# Patient Record
Sex: FEMALE | Race: WHITE | Employment: OTHER | URBAN - METROPOLITAN AREA
[De-identification: names, ages, dates, MRNs, and addresses within clinical notes are randomized per-mention and may not be internally consistent; named-entity substitution may affect disease eponyms.]

---

## 2018-05-08 LAB
AVERAGE GLUCOSE: NORMAL
CHOLESTEROL, TOTAL: 226 MG/DL
CHOLESTEROL/HDL RATIO: ABNORMAL
HBA1C MFR BLD: 9.3 %
HDLC SERPL-MCNC: 58 MG/DL (ref 35–70)
LDL CHOLESTEROL CALCULATED: 164 MG/DL (ref 0–160)
TRIGL SERPL-MCNC: 305 MG/DL
VLDLC SERPL CALC-MCNC: ABNORMAL MG/DL

## 2018-09-25 ENCOUNTER — OFFICE VISIT (OUTPATIENT)
Dept: FAMILY MEDICINE CLINIC | Age: 78
End: 2018-09-25
Payer: MEDICARE

## 2018-09-25 VITALS
SYSTOLIC BLOOD PRESSURE: 132 MMHG | HEIGHT: 65 IN | DIASTOLIC BLOOD PRESSURE: 80 MMHG | HEART RATE: 67 BPM | RESPIRATION RATE: 20 BRPM | TEMPERATURE: 98.4 F | BODY MASS INDEX: 35.09 KG/M2 | WEIGHT: 210.6 LBS

## 2018-09-25 DIAGNOSIS — Z79.4 TYPE 2 DIABETES MELLITUS WITHOUT COMPLICATION, WITH LONG-TERM CURRENT USE OF INSULIN (HCC): Primary | ICD-10-CM

## 2018-09-25 DIAGNOSIS — F41.9 ANXIETY: ICD-10-CM

## 2018-09-25 DIAGNOSIS — Z23 NEEDS FLU SHOT: ICD-10-CM

## 2018-09-25 DIAGNOSIS — I10 ESSENTIAL HYPERTENSION: ICD-10-CM

## 2018-09-25 DIAGNOSIS — E78.5 HYPERLIPIDEMIA, UNSPECIFIED HYPERLIPIDEMIA TYPE: ICD-10-CM

## 2018-09-25 DIAGNOSIS — E11.9 TYPE 2 DIABETES MELLITUS WITHOUT COMPLICATION, WITH LONG-TERM CURRENT USE OF INSULIN (HCC): Primary | ICD-10-CM

## 2018-09-25 PROCEDURE — 90662 IIV NO PRSV INCREASED AG IM: CPT | Performed by: FAMILY MEDICINE

## 2018-09-25 PROCEDURE — G0008 ADMIN INFLUENZA VIRUS VAC: HCPCS | Performed by: FAMILY MEDICINE

## 2018-09-25 PROCEDURE — 90670 PCV13 VACCINE IM: CPT | Performed by: FAMILY MEDICINE

## 2018-09-25 PROCEDURE — 99203 OFFICE O/P NEW LOW 30 MIN: CPT | Performed by: FAMILY MEDICINE

## 2018-09-25 PROCEDURE — G0009 ADMIN PNEUMOCOCCAL VACCINE: HCPCS | Performed by: FAMILY MEDICINE

## 2018-09-25 RX ORDER — GLIMEPIRIDE 2 MG/1
1 TABLET ORAL 2 TIMES DAILY
COMMUNITY
Start: 2018-09-05 | End: 2018-12-11 | Stop reason: SDUPTHER

## 2018-09-25 RX ORDER — CHLORAL HYDRATE 500 MG
3000 CAPSULE ORAL 2 TIMES DAILY
COMMUNITY

## 2018-09-25 RX ORDER — CITALOPRAM 20 MG/1
1 TABLET ORAL DAILY
COMMUNITY
Start: 2018-07-09 | End: 2018-10-29 | Stop reason: SDUPTHER

## 2018-09-25 RX ORDER — INSULIN GLARGINE 100 [IU]/ML
18 INJECTION, SOLUTION SUBCUTANEOUS DAILY
COMMUNITY
Start: 2018-08-17 | End: 2019-03-27 | Stop reason: SDUPTHER

## 2018-09-25 RX ORDER — METOPROLOL TARTRATE 50 MG/1
1 TABLET, FILM COATED ORAL 2 TIMES DAILY
COMMUNITY
Start: 2018-09-05 | End: 2018-12-11 | Stop reason: SDUPTHER

## 2018-09-25 RX ORDER — AMLODIPINE BESYLATE 5 MG/1
1 TABLET ORAL DAILY
COMMUNITY
Start: 2018-08-23 | End: 2018-11-20 | Stop reason: SDUPTHER

## 2018-09-25 ASSESSMENT — PATIENT HEALTH QUESTIONNAIRE - PHQ9
2. FEELING DOWN, DEPRESSED OR HOPELESS: 0
1. LITTLE INTEREST OR PLEASURE IN DOING THINGS: 0
SUM OF ALL RESPONSES TO PHQ QUESTIONS 1-9: 0
SUM OF ALL RESPONSES TO PHQ QUESTIONS 1-9: 0
SUM OF ALL RESPONSES TO PHQ9 QUESTIONS 1 & 2: 0

## 2018-09-25 NOTE — PATIENT INSTRUCTIONS
cause a severe allergic reaction. Such reactions from a vaccine are very rare, estimated at about 1 in a million doses, and would happen within a few minutes to a few hours after the vaccination. As with any medicine, there is a very small chance of a vaccine causing a serious injury or death. The safety of vaccines is always being monitored. For more information, visit: www.cdc.gov/vaccinesafety. What if there is a serious reaction? What should I look for? · Look for anything that concerns you, such as signs of a severe allergic reaction, very high fever, or unusual behavior. Signs of a severe allergic reaction can include hives, swelling of the face and throat, difficulty breathing, a fast heartbeat, dizziness, and weakness, usually within a few minutes to a few hours after the vaccination. What should I do? · If you think it is a severe allergic reaction or other emergency that can't wait, call 911 or get the person to the nearest hospital. Otherwise, call your doctor. · Reactions should be reported to the Vaccine Adverse Event Reporting System (VAERS). Your doctor should file this report, or you can do it yourself through the VAERS website at www.vaers. [facility].gov, or by calling 7-474.530.5949. VAERS does not give medical advice. The National Vaccine Injury Compensation Program  The National Vaccine Injury Compensation Program (VICP) is a federal program that was created to compensate people who may have been injured by certain vaccines. Persons who believe they may have been injured by a vaccine can learn about the program and about filing a claim by calling 3-831.513.1613 or visiting the [employer] website at www.[facility].gov/vaccinecompensation. There is a time limit to file a claim for compensation. How can I learn more? · Ask your healthcare provider. He or she can give you the vaccine package insert or suggest other sources of information. · Call your local or state health department.   · Contact the Centers for Disease Control and Prevention (CDC):  ¨ Call 4-825.440.7582 (1-800-CDC-INFO) or  ¨ Visit CDC's website at www.cdc.gov/vaccines  Vaccine Information Statement  PCV13 Vaccine  11/5/2015  43 JESSICA Thomas [id number]  Department of Health and Human Services  Centers for Disease Control and Prevention  Many Vaccine Information Statements are available in Citizen of [place] and other languages. See www.immunize.org/vis. Muchas hojas de información sobre vacunas están disponibles en español y en otros idiomas. Visite www.immunize.org/vis. Care instructions adapted under license by [facility] ([facility]). If you have questions about a medical condition or this instruction, always ask your healthcare professional. [address] any warranty or liability for your use of this information.

## 2018-09-27 ASSESSMENT — ENCOUNTER SYMPTOMS
DIARRHEA: 0
SHORTNESS OF BREATH: 0
EYES NEGATIVE: 1
ABDOMINAL PAIN: 0
NAUSEA: 0
CHEST TIGHTNESS: 0
RHINORRHEA: 0
VOMITING: 0
COUGH: 0
BACK PAIN: 0
SORE THROAT: 0

## 2018-09-27 NOTE — PROGRESS NOTES
[address] [address]  Dept: [phone]  Dept Fax: [phone]  Loc: [phone]  PROGRESS NOTE      Visit Date: 9/25/2018    Jonathan Rabago is a 69 y.o. female who presents today for:  Chief Complaint   Patient presents with    New Patient     was in [facility] 7/8 for poss stroke but was ruled out. Says sx were from diabetes. BS this AM [id number] fasting. . Brother recommended  she see Dr. Cota So     discuss pneumonia vaccines. Subjective:  HPI  Patient comes in to establish. History of diabetes. Recent hospitalization for strokelike symptoms. To be due to elevated blood sugar. There is history of heart or hyperlipidemia hypertension obesity anxiety and hematuria. No current complaints today. Due for vaccines today to obtain old records    Review of Systems   Constitutional: Negative for activity change, appetite change, fatigue and fever. HENT: Negative for congestion, rhinorrhea and sore throat. Eyes: Negative. Respiratory: Negative for cough, chest tightness and shortness of breath. Cardiovascular: Negative for chest pain and palpitations. Gastrointestinal: Negative for abdominal pain, diarrhea, nausea and vomiting. Genitourinary: Negative for dysuria and urgency. Musculoskeletal: Negative for arthralgias and back pain. Neurological: Negative for dizziness and headaches. Psychiatric/Behavioral: Negative for dysphoric mood. The patient is not nervous/anxious. Past Medical History:   Diagnosis Date    Anxiety     Cardiac murmur     Hypercholesterolemia     no medication recommended - just dietary recommendations    Hypertension     130's/80-90's at home, white coat hypertension, stress test 2007 prior to TKA at [facility] 112 Microscopic hematuria     Obesity (BMI 30-39. 9)     Snoring     no apnea, no waking coughing or choking, no am headache, BMI Standing Expiration Date:   9/25/2019       Patient given educational materials - see patient instructions. Discussed use, benefit, and side effects of prescribed medications. All patient questions answered. Pt voiced understanding. Reviewed health maintenance. Patient agreed with treatment plan. Follow up as directed. **This report has been created using voice recognition software. It may contain minor errors which are inherent in voice recognition technology. **       Electronically signed by Dolly Osorio MD on 9/27/2018 at 8:45 AM

## 2018-10-11 LAB
A/G RATIO: 1.5 (ref 1.5–2.5)
ALBUMIN SERPL-MCNC: 4.4 GM/DL (ref 3.5–5)
ALP BLD-CCNC: 59 IU/L (ref 39–118)
ALT SERPL-CCNC: 14 IU/L (ref 10–40)
ANION GAP SERPL CALCULATED.3IONS-SCNC: 9 MMOL/L (ref 4–12)
AST SERPL-CCNC: 14 IU/L (ref 15–41)
BILIRUB SERPL-MCNC: 0.2 MG/DL (ref 0.2–1)
BUN BLDV-MCNC: 14 MG/DL (ref 7–20)
CALCIUM SERPL-MCNC: 9 MG/DL (ref 8.8–10.5)
CHLORIDE BLD-SCNC: 101 MEQ/L (ref 101–111)
CHOLESTEROL/HDL RELATIVE RISK: 4.5 (ref 4–4.4)
CHOLESTEROL: 222 MG/DL
CO2: 31 MEQ/L (ref 21–32)
CREAT SERPL-MCNC: 0.55 MG/DL (ref 0.6–1.3)
CREATININE CLEARANCE: >60
DIRECT-LDL / HDL RISK: 3.4
ESTIMATED AVERAGE GLUCOSE: 169 MG/DL
GLUCOSE: 122 MG/DL (ref 70–110)
HBA1C MFR BLD: 7.5 % (ref 4.4–6.4)
HDLC SERPL-MCNC: 49 MG/DL
LDL CHOLESTEROL DIRECT: 171 MG/DL
POTASSIUM SERPL-SCNC: 3.6 MEQ/L (ref 3.6–5)
SODIUM BLD-SCNC: 141 MEQ/L (ref 135–145)
TOTAL PROTEIN: 7.3 G/DL (ref 6.2–8)
TRIGL SERPL-MCNC: 104 MG/DL
VLDLC SERPL CALC-MCNC: 20 MG/DL

## 2018-10-29 ENCOUNTER — OFFICE VISIT (OUTPATIENT)
Dept: FAMILY MEDICINE CLINIC | Age: 78
End: 2018-10-29
Payer: MEDICARE

## 2018-10-29 VITALS
TEMPERATURE: 98.3 F | HEART RATE: 76 BPM | WEIGHT: 210 LBS | DIASTOLIC BLOOD PRESSURE: 82 MMHG | SYSTOLIC BLOOD PRESSURE: 136 MMHG | BODY MASS INDEX: 34.95 KG/M2

## 2018-10-29 DIAGNOSIS — E11.9 TYPE 2 DIABETES MELLITUS WITHOUT COMPLICATION, WITH LONG-TERM CURRENT USE OF INSULIN (HCC): Primary | ICD-10-CM

## 2018-10-29 DIAGNOSIS — I10 ESSENTIAL HYPERTENSION: ICD-10-CM

## 2018-10-29 DIAGNOSIS — Z79.4 TYPE 2 DIABETES MELLITUS WITHOUT COMPLICATION, WITH LONG-TERM CURRENT USE OF INSULIN (HCC): Primary | ICD-10-CM

## 2018-10-29 DIAGNOSIS — E78.5 HYPERLIPIDEMIA, UNSPECIFIED HYPERLIPIDEMIA TYPE: ICD-10-CM

## 2018-10-29 PROCEDURE — 99213 OFFICE O/P EST LOW 20 MIN: CPT | Performed by: FAMILY MEDICINE

## 2018-10-29 RX ORDER — ROSUVASTATIN CALCIUM 10 MG/1
10 TABLET, COATED ORAL NIGHTLY
Qty: 90 TABLET | Refills: 3 | Status: ON HOLD | OUTPATIENT
Start: 2018-10-29 | End: 2019-07-30 | Stop reason: ALTCHOICE

## 2018-10-29 RX ORDER — CITALOPRAM 20 MG/1
20 TABLET ORAL DAILY
Qty: 90 TABLET | Refills: 3 | Status: SHIPPED | OUTPATIENT
Start: 2018-10-29 | End: 2020-01-07 | Stop reason: ALTCHOICE

## 2018-10-30 ASSESSMENT — ENCOUNTER SYMPTOMS
RHINORRHEA: 0
DIARRHEA: 0
SORE THROAT: 0
NAUSEA: 0
CHEST TIGHTNESS: 0
EYES NEGATIVE: 1
BACK PAIN: 0
VOMITING: 0
COUGH: 0
SHORTNESS OF BREATH: 0
ABDOMINAL PAIN: 0

## 2018-10-30 NOTE — PROGRESS NOTES
10/11/2019    Flu vaccine  Completed         Objective:     Physical Exam   Constitutional: She is oriented to person, place, and time. She appears well-developed and well-nourished. No distress. HENT:   Head: Normocephalic and atraumatic. Eyes: Conjunctivae are normal. No scleral icterus. Neck: No JVD present. No thyromegaly present. No bruits   Cardiovascular: Normal rate, regular rhythm and normal heart sounds. Pulmonary/Chest: Effort normal and breath sounds normal. No respiratory distress. She has no wheezes. She has no rales. Abdominal: Soft. She exhibits no mass. There is no tenderness. There is no guarding. Musculoskeletal: She exhibits no edema or tenderness. Neurological: She is alert and oriented to person, place, and time. No cranial nerve deficit. Skin: Skin is warm and dry. No rash noted. She is not diaphoretic. /82   Pulse 76   Temp 98.3 °F (36.8 °C) (Oral)   Wt 210 lb (95.3 kg)   BMI 34.95 kg/m²       Impression/Plan:  1. Type 2 diabetes mellitus without complication, with long-term current use of insulin ([facility] [address]    2. Essential hypertension    3. Hyperlipidemia, unspecified hyperlipidemia type      Requested Prescriptions     Signed Prescriptions Disp Refills    citalopram (CELEXA) 20 MG tablet 90 tablet 3     Sig: Take 1 tablet by mouth daily    rosuvastatin (CRESTOR) 10 MG tablet 90 tablet 3     Sig: Take 1 tablet by mouth nightly     Orders Placed This Encounter   Procedures    Hemoglobin A1C     Standing Status:   Future     Standing Expiration Date:   10/29/2019    Lipid Panel     Standing Status:   Future     Standing Expiration Date:   10/29/2019     Order Specific Question:   Is Patient Fasting?/# of Hours     Answer:   yes/12    Comprehensive Metabolic Panel     Standing Status:   Future     Standing Expiration Date:   10/29/2019       Patient giveneducational materials - see patient instructions.   Discussed use, benefit, and side effects of prescribed

## 2018-11-20 RX ORDER — AMLODIPINE BESYLATE 5 MG/1
5 TABLET ORAL DAILY
Qty: 30 TABLET | Refills: 5 | Status: SHIPPED | OUTPATIENT
Start: 2018-11-20 | End: 2019-03-27 | Stop reason: SDUPTHER

## 2018-11-30 ENCOUNTER — OFFICE VISIT (OUTPATIENT)
Dept: FAMILY MEDICINE CLINIC | Age: 78
End: 2018-11-30
Payer: MEDICARE

## 2018-11-30 VITALS
DIASTOLIC BLOOD PRESSURE: 62 MMHG | BODY MASS INDEX: 35.18 KG/M2 | HEART RATE: 60 BPM | RESPIRATION RATE: 16 BRPM | TEMPERATURE: 98.2 F | WEIGHT: 211.4 LBS | SYSTOLIC BLOOD PRESSURE: 139 MMHG

## 2018-11-30 DIAGNOSIS — R06.02 SHORTNESS OF BREATH: Primary | ICD-10-CM

## 2018-11-30 DIAGNOSIS — R42 DIZZINESS: ICD-10-CM

## 2018-11-30 PROCEDURE — 99214 OFFICE O/P EST MOD 30 MIN: CPT | Performed by: NURSE PRACTITIONER

## 2018-11-30 RX ORDER — ASPIRIN 325 MG
325 TABLET ORAL DAILY
COMMUNITY
End: 2020-02-25 | Stop reason: HOSPADM

## 2018-11-30 ASSESSMENT — ENCOUNTER SYMPTOMS
WHEEZING: 0
SHORTNESS OF BREATH: 1
DIARRHEA: 0
NAUSEA: 0
COUGH: 0
CONSTIPATION: 0
ABDOMINAL PAIN: 0
SORE THROAT: 0

## 2018-12-03 ENCOUNTER — TELEPHONE (OUTPATIENT)
Dept: FAMILY MEDICINE CLINIC | Age: 78
End: 2018-12-03

## 2018-12-04 ENCOUNTER — OFFICE VISIT (OUTPATIENT)
Dept: FAMILY MEDICINE CLINIC | Age: 78
End: 2018-12-04
Payer: MEDICARE

## 2018-12-04 VITALS
BODY MASS INDEX: 34.61 KG/M2 | HEART RATE: 65 BPM | RESPIRATION RATE: 16 BRPM | SYSTOLIC BLOOD PRESSURE: 138 MMHG | TEMPERATURE: 97.6 F | DIASTOLIC BLOOD PRESSURE: 84 MMHG | OXYGEN SATURATION: 99 % | WEIGHT: 208 LBS

## 2018-12-04 DIAGNOSIS — R42 VERTIGO: Primary | ICD-10-CM

## 2018-12-04 PROCEDURE — 99213 OFFICE O/P EST LOW 20 MIN: CPT | Performed by: FAMILY MEDICINE

## 2018-12-04 RX ORDER — MECLIZINE HYDROCHLORIDE 25 MG/1
25 TABLET ORAL 3 TIMES DAILY PRN
Qty: 30 TABLET | Refills: 0 | Status: SHIPPED | OUTPATIENT
Start: 2018-12-04 | End: 2018-12-14

## 2018-12-09 ASSESSMENT — ENCOUNTER SYMPTOMS
DIARRHEA: 0
CHEST TIGHTNESS: 0
NAUSEA: 0
COUGH: 0
VOMITING: 0
BACK PAIN: 0
EYES NEGATIVE: 1
SORE THROAT: 0
SHORTNESS OF BREATH: 0
RHINORRHEA: 0
ABDOMINAL PAIN: 0

## 2018-12-09 NOTE — PROGRESS NOTES
04/22/2006    Pneumococcal low/med risk (2 of 2 - PPSV23) 09/25/2019    Potassium monitoring  10/11/2019    Creatinine monitoring  10/11/2019    Flu vaccine  Completed         Objective:     Physical Exam   Constitutional: She is oriented to person, place, and time. She appears well-developed and well-nourished. No distress. HENT:   Head: Normocephalic and atraumatic. Eyes: Conjunctivae are normal. No scleral icterus. Neck: No JVD present. No thyromegaly present. No bruits   Cardiovascular: Normal rate, regular rhythm and normal heart sounds. Pulmonary/Chest: Effort normal and breath sounds normal. No respiratory distress. She has no wheezes. She has no rales. Abdominal: Soft. She exhibits no mass. There is no tenderness. There is no guarding. Musculoskeletal: She exhibits no edema or tenderness. Neurological: She is alert and oriented to person, place, and time. No cranial nerve deficit. Has nystagmus has   Skin: Skin is warm and dry. No rash noted. She is not diaphoretic. /84   Pulse 65   Temp 97.6 °F (36.4 °C) (Oral)   Resp 16   Wt 208 lb (94.3 kg)   SpO2 99%   BMI 34.61 kg/m²       Impression/Plan:  1. Vertigo      Requested Prescriptions     Signed Prescriptions Disp Refills    meclizine (ANTIVERT) 25 MG tablet 30 tablet 0     Sig: Take 1 tablet by mouth 3 times daily as needed for Dizziness     No orders of the defined types were placed in this encounter. Patient giveneducational materials - see patient instructions. Discussed use, benefit, and side effects of prescribed medications. All patient questions answered. Pt voiced understanding. Reviewed health maintenance. Patient agreedwith treatment plan. Follow up as directed. **This report has been created using voice recognition software. It may contain minor errorswhich are inherent in voice recognition technology. **       Electronically signed by Harris Rodriguez MD on 12/9/2018 at 9:27 AM

## 2018-12-11 RX ORDER — GLIMEPIRIDE 2 MG/1
2 TABLET ORAL 2 TIMES DAILY
Qty: 180 TABLET | Refills: 2 | Status: SHIPPED | OUTPATIENT
Start: 2018-12-11 | End: 2019-09-10 | Stop reason: SDUPTHER

## 2018-12-11 RX ORDER — METOPROLOL TARTRATE 50 MG/1
50 TABLET, FILM COATED ORAL 2 TIMES DAILY
Qty: 180 TABLET | Refills: 2 | Status: SHIPPED | OUTPATIENT
Start: 2018-12-11 | End: 2019-09-10 | Stop reason: SDUPTHER

## 2018-12-11 NOTE — TELEPHONE ENCOUNTER
12/11/2018   Tammie Cotton called requesting a refill on the following medications:  Requested Prescriptions     Pending Prescriptions Disp Refills    metoprolol tartrate (LOPRESSOR) 50 MG tablet 60 tablet      Sig: Take 1 tablet by mouth 2 times daily    glimepiride (AMARYL) 2 MG tablet 30 tablet      Sig: Take 1 tablet by mouth 2 times daily     Pharmacy verified:  .pv    PATIENT REQUESTING 180 QUANTITY FOR BOTH MEDICATIONS.     Date of last visit: 12/04/2018  Date of next visit (if applicable): Visit date not found

## 2019-01-11 RX ORDER — RAMIPRIL 10 MG/1
10 CAPSULE ORAL DAILY
Qty: 30 CAPSULE | Refills: 3 | Status: SHIPPED | OUTPATIENT
Start: 2019-01-11 | End: 2019-05-13 | Stop reason: SDUPTHER

## 2019-03-27 ENCOUNTER — TELEPHONE (OUTPATIENT)
Dept: FAMILY MEDICINE CLINIC | Age: 79
End: 2019-03-27

## 2019-03-27 DIAGNOSIS — Z79.4 TYPE 2 DIABETES MELLITUS WITHOUT COMPLICATION, WITH LONG-TERM CURRENT USE OF INSULIN (HCC): Primary | ICD-10-CM

## 2019-03-27 DIAGNOSIS — E11.9 TYPE 2 DIABETES MELLITUS WITHOUT COMPLICATION, WITH LONG-TERM CURRENT USE OF INSULIN (HCC): Primary | ICD-10-CM

## 2019-03-27 RX ORDER — INSULIN GLARGINE 100 [IU]/ML
18 INJECTION, SOLUTION SUBCUTANEOUS DAILY
Qty: 5 PEN | Refills: 5 | Status: SHIPPED | OUTPATIENT
Start: 2019-03-27 | End: 2020-06-17

## 2019-03-27 RX ORDER — AMLODIPINE BESYLATE 5 MG/1
5 TABLET ORAL DAILY
Qty: 30 TABLET | Refills: 5 | Status: SHIPPED | OUTPATIENT
Start: 2019-03-27 | End: 2019-10-07 | Stop reason: SDUPTHER

## 2019-04-08 NOTE — TELEPHONE ENCOUNTER
Popeye Tian called requesting a refill on the following medications:  Requested Prescriptions     Pending Prescriptions Disp Refills    metFORMIN (GLUCOPHAGE) 1000 MG tablet       Sig: Take 1 tablet by mouth 2 times daily     Pharmacy verified: [facility]       Date of last visit: 12/4/18  Date of next visit (if applicable): Visit date not found

## 2019-05-14 RX ORDER — RAMIPRIL 10 MG/1
CAPSULE ORAL
Qty: 90 CAPSULE | Refills: 1 | Status: SHIPPED | OUTPATIENT
Start: 2019-05-14 | End: 2019-11-12 | Stop reason: SDUPTHER

## 2019-07-30 ENCOUNTER — APPOINTMENT (OUTPATIENT)
Dept: GENERAL RADIOLOGY | Age: 79
End: 2019-07-30
Attending: ORTHOPAEDIC SURGERY
Payer: MEDICARE

## 2019-07-30 ENCOUNTER — ANESTHESIA (OUTPATIENT)
Dept: OPERATING ROOM | Age: 79
End: 2019-07-30
Payer: MEDICARE

## 2019-07-30 ENCOUNTER — HOSPITAL ENCOUNTER (OUTPATIENT)
Age: 79
Setting detail: OUTPATIENT SURGERY
Discharge: HOME | End: 2019-07-30
Attending: ORTHOPAEDIC SURGERY | Admitting: ORTHOPAEDIC SURGERY
Payer: MEDICARE

## 2019-07-30 ENCOUNTER — ANESTHESIA EVENT (OUTPATIENT)
Dept: OPERATING ROOM | Age: 79
End: 2019-07-30
Payer: MEDICARE

## 2019-07-30 VITALS
SYSTOLIC BLOOD PRESSURE: 149 MMHG | DIASTOLIC BLOOD PRESSURE: 68 MMHG | HEIGHT: 65 IN | BODY MASS INDEX: 35.29 KG/M2 | OXYGEN SATURATION: 92 % | HEART RATE: 76 BPM | RESPIRATION RATE: 16 BRPM | TEMPERATURE: 98.2 F | WEIGHT: 211.8 LBS

## 2019-07-30 VITALS
SYSTOLIC BLOOD PRESSURE: 139 MMHG | DIASTOLIC BLOOD PRESSURE: 68 MMHG | RESPIRATION RATE: 2 BRPM | OXYGEN SATURATION: 98 %

## 2019-07-30 DIAGNOSIS — S52.502A CLOSED FRACTURE OF LEFT DISTAL RADIUS AND ULNA, INITIAL ENCOUNTER: Primary | ICD-10-CM

## 2019-07-30 DIAGNOSIS — S52.602A CLOSED FRACTURE OF LEFT DISTAL RADIUS AND ULNA, INITIAL ENCOUNTER: Primary | ICD-10-CM

## 2019-07-30 LAB
GLUCOSE BLD-MCNC: 175 MG/DL (ref 70–108)
GLUCOSE BLD-MCNC: 176 MG/DL (ref 70–108)

## 2019-07-30 PROCEDURE — 3700000000 HC ANESTHESIA ATTENDED CARE: Performed by: ORTHOPAEDIC SURGERY

## 2019-07-30 PROCEDURE — 6360000002 HC RX W HCPCS: Performed by: NURSE ANESTHETIST, CERTIFIED REGISTERED

## 2019-07-30 PROCEDURE — 6360000002 HC RX W HCPCS: Performed by: PHYSICIAN ASSISTANT

## 2019-07-30 PROCEDURE — 82948 REAGENT STRIP/BLOOD GLUCOSE: CPT

## 2019-07-30 PROCEDURE — 3700000001 HC ADD 15 MINUTES (ANESTHESIA): Performed by: ORTHOPAEDIC SURGERY

## 2019-07-30 PROCEDURE — 7100000001 HC PACU RECOVERY - ADDTL 15 MIN: Performed by: ORTHOPAEDIC SURGERY

## 2019-07-30 PROCEDURE — 3209999900 FLUORO FOR SURGICAL PROCEDURES

## 2019-07-30 PROCEDURE — 2580000003 HC RX 258: Performed by: NURSE ANESTHETIST, CERTIFIED REGISTERED

## 2019-07-30 PROCEDURE — 6360000002 HC RX W HCPCS: Performed by: ANESTHESIOLOGY

## 2019-07-30 PROCEDURE — 7100000000 HC PACU RECOVERY - FIRST 15 MIN: Performed by: ORTHOPAEDIC SURGERY

## 2019-07-30 PROCEDURE — 2709999900 HC NON-CHARGEABLE SUPPLY: Performed by: ORTHOPAEDIC SURGERY

## 2019-07-30 PROCEDURE — 7100000011 HC PHASE II RECOVERY - ADDTL 15 MIN: Performed by: ORTHOPAEDIC SURGERY

## 2019-07-30 PROCEDURE — 2500000003 HC RX 250 WO HCPCS: Performed by: NURSE ANESTHETIST, CERTIFIED REGISTERED

## 2019-07-30 PROCEDURE — 2500000003 HC RX 250 WO HCPCS: Performed by: ANESTHESIOLOGY

## 2019-07-30 PROCEDURE — 7100000010 HC PHASE II RECOVERY - FIRST 15 MIN: Performed by: ORTHOPAEDIC SURGERY

## 2019-07-30 PROCEDURE — 6370000000 HC RX 637 (ALT 250 FOR IP): Performed by: ORTHOPAEDIC SURGERY

## 2019-07-30 PROCEDURE — C1713 ANCHOR/SCREW BN/BN,TIS/BN: HCPCS | Performed by: ORTHOPAEDIC SURGERY

## 2019-07-30 PROCEDURE — 73100 X-RAY EXAM OF WRIST: CPT

## 2019-07-30 PROCEDURE — 3600000014 HC SURGERY LEVEL 4 ADDTL 15MIN: Performed by: ORTHOPAEDIC SURGERY

## 2019-07-30 PROCEDURE — 6360000002 HC RX W HCPCS

## 2019-07-30 PROCEDURE — 2580000003 HC RX 258: Performed by: PHYSICIAN ASSISTANT

## 2019-07-30 PROCEDURE — 3600000004 HC SURGERY LEVEL 4 BASE: Performed by: ORTHOPAEDIC SURGERY

## 2019-07-30 DEVICE — PERI-LOC 2.5MM T7 LOCKING SCREW                                    20MM SELF-TAPPING
Type: IMPLANTABLE DEVICE | Site: WRIST | Status: FUNCTIONAL
Brand: PERI-LOC

## 2019-07-30 DEVICE — PERI-LOC K-WIRE 1.6MM X 150MM                                    LENGTH TROCAR POINT
Type: IMPLANTABLE DEVICE | Site: WRIST | Status: FUNCTIONAL
Brand: PERI-LOC

## 2019-07-30 DEVICE — PERI-LOC 2.5MM T7 LOCKING SCREW                                    18MM SELF-TAPPING
Type: IMPLANTABLE DEVICE | Site: WRIST | Status: FUNCTIONAL
Brand: PERI-LOC

## 2019-07-30 DEVICE — PERI-LOC 3.5MM T20 CORTEX SCREW                                    12MM SELF-TAPPING
Type: IMPLANTABLE DEVICE | Site: WRIST | Status: FUNCTIONAL
Brand: PERI-LOC

## 2019-07-30 DEVICE — PERI-LOC 2.5MM T7 LOCKING SCREW                                    16MM SELF-TAPPING
Type: IMPLANTABLE DEVICE | Site: WRIST | Status: FUNCTIONAL
Brand: PERI-LOC

## 2019-07-30 DEVICE — PERI-LOC VOLAR DISTAL RADIUS                                    LOCKING PLATE STANDARD HEAD 3 HOLE                                    SHAFT RIGHT 62MM
Type: IMPLANTABLE DEVICE | Site: WRIST | Status: FUNCTIONAL
Brand: PERI-LOC

## 2019-07-30 DEVICE — PERI-LOC 3.5MM T20 LOCKING SCREW                                    14MM SELF-TAPPING
Type: IMPLANTABLE DEVICE | Site: WRIST | Status: FUNCTIONAL
Brand: PERI-LOC

## 2019-07-30 RX ORDER — FENTANYL CITRATE 50 UG/ML
25 INJECTION, SOLUTION INTRAMUSCULAR; INTRAVENOUS EVERY 5 MIN PRN
Status: DISCONTINUED | OUTPATIENT
Start: 2019-07-30 | End: 2019-07-30 | Stop reason: HOSPADM

## 2019-07-30 RX ORDER — LABETALOL 20 MG/4 ML (5 MG/ML) INTRAVENOUS SYRINGE
10 ONCE
Status: COMPLETED | OUTPATIENT
Start: 2019-07-30 | End: 2019-07-30

## 2019-07-30 RX ORDER — MORPHINE SULFATE 2 MG/ML
2 INJECTION, SOLUTION INTRAMUSCULAR; INTRAVENOUS
Status: DISCONTINUED | OUTPATIENT
Start: 2019-07-30 | End: 2019-07-30 | Stop reason: HOSPADM

## 2019-07-30 RX ORDER — SODIUM CHLORIDE 0.9 % (FLUSH) 0.9 %
10 SYRINGE (ML) INJECTION EVERY 12 HOURS SCHEDULED
Status: DISCONTINUED | OUTPATIENT
Start: 2019-07-30 | End: 2019-07-30 | Stop reason: HOSPADM

## 2019-07-30 RX ORDER — METOCLOPRAMIDE HYDROCHLORIDE 5 MG/ML
10 INJECTION INTRAMUSCULAR; INTRAVENOUS
Status: DISCONTINUED | OUTPATIENT
Start: 2019-07-30 | End: 2019-07-30 | Stop reason: HOSPADM

## 2019-07-30 RX ORDER — DOCUSATE SODIUM 100 MG/1
100 CAPSULE, LIQUID FILLED ORAL 2 TIMES DAILY
Status: DISCONTINUED | OUTPATIENT
Start: 2019-07-30 | End: 2019-07-30 | Stop reason: HOSPADM

## 2019-07-30 RX ORDER — SODIUM CHLORIDE, SODIUM LACTATE, POTASSIUM CHLORIDE, CALCIUM CHLORIDE 600; 310; 30; 20 MG/100ML; MG/100ML; MG/100ML; MG/100ML
INJECTION, SOLUTION INTRAVENOUS CONTINUOUS PRN
Status: DISCONTINUED | OUTPATIENT
Start: 2019-07-30 | End: 2019-07-30 | Stop reason: SDUPTHER

## 2019-07-30 RX ORDER — ONDANSETRON 2 MG/ML
INJECTION INTRAMUSCULAR; INTRAVENOUS PRN
Status: DISCONTINUED | OUTPATIENT
Start: 2019-07-30 | End: 2019-07-30 | Stop reason: SDUPTHER

## 2019-07-30 RX ORDER — FENTANYL CITRATE 50 UG/ML
50 INJECTION, SOLUTION INTRAMUSCULAR; INTRAVENOUS EVERY 5 MIN PRN
Status: DISCONTINUED | OUTPATIENT
Start: 2019-07-30 | End: 2019-07-30 | Stop reason: HOSPADM

## 2019-07-30 RX ORDER — PROMETHAZINE HYDROCHLORIDE 25 MG/ML
12.5 INJECTION, SOLUTION INTRAMUSCULAR; INTRAVENOUS
Status: DISCONTINUED | OUTPATIENT
Start: 2019-07-30 | End: 2019-07-30 | Stop reason: HOSPADM

## 2019-07-30 RX ORDER — LABETALOL 20 MG/4 ML (5 MG/ML) INTRAVENOUS SYRINGE
PRN
Status: DISCONTINUED | OUTPATIENT
Start: 2019-07-30 | End: 2019-07-30 | Stop reason: SDUPTHER

## 2019-07-30 RX ORDER — SODIUM CHLORIDE 0.9 % (FLUSH) 0.9 %
10 SYRINGE (ML) INJECTION PRN
Status: DISCONTINUED | OUTPATIENT
Start: 2019-07-30 | End: 2019-07-30 | Stop reason: HOSPADM

## 2019-07-30 RX ORDER — HYDROCODONE BITARTRATE AND ACETAMINOPHEN 5; 325 MG/1; MG/1
1 TABLET ORAL EVERY 4 HOURS PRN
Qty: 42 TABLET | Refills: 0 | Status: SHIPPED | OUTPATIENT
Start: 2019-07-30 | End: 2019-08-06

## 2019-07-30 RX ORDER — FENTANYL CITRATE 50 UG/ML
INJECTION, SOLUTION INTRAMUSCULAR; INTRAVENOUS
Status: COMPLETED
Start: 2019-07-30 | End: 2019-07-30

## 2019-07-30 RX ORDER — HYDRALAZINE HYDROCHLORIDE 20 MG/ML
INJECTION INTRAMUSCULAR; INTRAVENOUS
Status: COMPLETED
Start: 2019-07-30 | End: 2019-07-30

## 2019-07-30 RX ORDER — SODIUM CHLORIDE 9 MG/ML
INJECTION, SOLUTION INTRAVENOUS CONTINUOUS
Status: DISCONTINUED | OUTPATIENT
Start: 2019-07-30 | End: 2019-07-30 | Stop reason: HOSPADM

## 2019-07-30 RX ORDER — TRAMADOL HYDROCHLORIDE 50 MG/1
50 TABLET ORAL EVERY 6 HOURS PRN
Status: DISCONTINUED | OUTPATIENT
Start: 2019-07-30 | End: 2019-07-30 | Stop reason: HOSPADM

## 2019-07-30 RX ORDER — FENTANYL CITRATE 50 UG/ML
INJECTION, SOLUTION INTRAMUSCULAR; INTRAVENOUS PRN
Status: DISCONTINUED | OUTPATIENT
Start: 2019-07-30 | End: 2019-07-30 | Stop reason: SDUPTHER

## 2019-07-30 RX ORDER — LIDOCAINE HYDROCHLORIDE 10 MG/ML
INJECTION, SOLUTION INFILTRATION; PERINEURAL PRN
Status: DISCONTINUED | OUTPATIENT
Start: 2019-07-30 | End: 2019-07-30 | Stop reason: SDUPTHER

## 2019-07-30 RX ORDER — LABETALOL 20 MG/4 ML (5 MG/ML) INTRAVENOUS SYRINGE
5 EVERY 10 MIN PRN
Status: DISCONTINUED | OUTPATIENT
Start: 2019-07-30 | End: 2019-07-30 | Stop reason: HOSPADM

## 2019-07-30 RX ORDER — DIPHENHYDRAMINE HYDROCHLORIDE 50 MG/ML
12.5 INJECTION INTRAMUSCULAR; INTRAVENOUS
Status: DISCONTINUED | OUTPATIENT
Start: 2019-07-30 | End: 2019-07-30 | Stop reason: HOSPADM

## 2019-07-30 RX ORDER — MEPERIDINE HYDROCHLORIDE 25 MG/ML
12.5 INJECTION INTRAMUSCULAR; INTRAVENOUS; SUBCUTANEOUS EVERY 5 MIN PRN
Status: DISCONTINUED | OUTPATIENT
Start: 2019-07-30 | End: 2019-07-30 | Stop reason: HOSPADM

## 2019-07-30 RX ORDER — PROPOFOL 10 MG/ML
INJECTION, EMULSION INTRAVENOUS PRN
Status: DISCONTINUED | OUTPATIENT
Start: 2019-07-30 | End: 2019-07-30 | Stop reason: SDUPTHER

## 2019-07-30 RX ORDER — ONDANSETRON 2 MG/ML
4 INJECTION INTRAMUSCULAR; INTRAVENOUS EVERY 6 HOURS PRN
Status: DISCONTINUED | OUTPATIENT
Start: 2019-07-30 | End: 2019-07-30 | Stop reason: HOSPADM

## 2019-07-30 RX ORDER — HYDRALAZINE HYDROCHLORIDE 20 MG/ML
10 INJECTION INTRAMUSCULAR; INTRAVENOUS EVERY 10 MIN PRN
Status: DISCONTINUED | OUTPATIENT
Start: 2019-07-30 | End: 2019-07-30 | Stop reason: HOSPADM

## 2019-07-30 RX ORDER — TRAMADOL HYDROCHLORIDE 50 MG/1
TABLET ORAL
Status: DISCONTINUED
Start: 2019-07-30 | End: 2019-07-30 | Stop reason: HOSPADM

## 2019-07-30 RX ORDER — DEXAMETHASONE SODIUM PHOSPHATE 4 MG/ML
INJECTION, SOLUTION INTRA-ARTICULAR; INTRALESIONAL; INTRAMUSCULAR; INTRAVENOUS; SOFT TISSUE PRN
Status: DISCONTINUED | OUTPATIENT
Start: 2019-07-30 | End: 2019-07-30 | Stop reason: SDUPTHER

## 2019-07-30 RX ADMIN — LABETALOL 20 MG/4 ML (5 MG/ML) INTRAVENOUS SYRINGE 10 MG: at 16:10

## 2019-07-30 RX ADMIN — HYDRALAZINE HYDROCHLORIDE 10 MG: 20 INJECTION INTRAMUSCULAR; INTRAVENOUS at 16:24

## 2019-07-30 RX ADMIN — SODIUM CHLORIDE, POTASSIUM CHLORIDE, SODIUM LACTATE AND CALCIUM CHLORIDE: 600; 310; 30; 20 INJECTION, SOLUTION INTRAVENOUS at 15:15

## 2019-07-30 RX ADMIN — FENTANYL CITRATE 50 MCG: 50 INJECTION INTRAMUSCULAR; INTRAVENOUS at 14:33

## 2019-07-30 RX ADMIN — FENTANYL CITRATE 50 MCG: 50 INJECTION, SOLUTION INTRAMUSCULAR; INTRAVENOUS at 16:00

## 2019-07-30 RX ADMIN — FENTANYL CITRATE 50 MCG: 50 INJECTION, SOLUTION INTRAMUSCULAR; INTRAVENOUS at 15:51

## 2019-07-30 RX ADMIN — LIDOCAINE HYDROCHLORIDE 50 MG: 10 INJECTION, SOLUTION INFILTRATION; PERINEURAL at 14:33

## 2019-07-30 RX ADMIN — DEXAMETHASONE SODIUM PHOSPHATE 8 MG: 4 INJECTION, SOLUTION INTRAMUSCULAR; INTRAVENOUS at 14:40

## 2019-07-30 RX ADMIN — SODIUM CHLORIDE: 9 INJECTION, SOLUTION INTRAVENOUS at 11:48

## 2019-07-30 RX ADMIN — HYDROMORPHONE HYDROCHLORIDE 0.5 MG: 1 INJECTION, SOLUTION INTRAMUSCULAR; INTRAVENOUS; SUBCUTANEOUS at 16:30

## 2019-07-30 RX ADMIN — LABETALOL 20 MG/4 ML (5 MG/ML) INTRAVENOUS SYRINGE 5 MG: at 15:45

## 2019-07-30 RX ADMIN — LABETALOL 20 MG/4 ML (5 MG/ML) INTRAVENOUS SYRINGE 5 MG: at 15:10

## 2019-07-30 RX ADMIN — LABETALOL 20 MG/4 ML (5 MG/ML) INTRAVENOUS SYRINGE 7.5 MG: at 14:56

## 2019-07-30 RX ADMIN — LABETALOL 20 MG/4 ML (5 MG/ML) INTRAVENOUS SYRINGE 10 MG: at 15:37

## 2019-07-30 RX ADMIN — FENTANYL CITRATE 50 MCG: 50 INJECTION INTRAMUSCULAR; INTRAVENOUS at 15:10

## 2019-07-30 RX ADMIN — PROPOFOL 150 MG: 10 INJECTION, EMULSION INTRAVENOUS at 14:33

## 2019-07-30 RX ADMIN — ONDANSETRON HYDROCHLORIDE 4 MG: 4 INJECTION, SOLUTION INTRAMUSCULAR; INTRAVENOUS at 15:15

## 2019-07-30 RX ADMIN — LABETALOL 20 MG/4 ML (5 MG/ML) INTRAVENOUS SYRINGE 7.5 MG: at 15:26

## 2019-07-30 RX ADMIN — FENTANYL CITRATE 50 MCG: 50 INJECTION INTRAMUSCULAR; INTRAVENOUS at 14:51

## 2019-07-30 RX ADMIN — HYDROMORPHONE HYDROCHLORIDE 0.5 MG: 1 INJECTION, SOLUTION INTRAMUSCULAR; INTRAVENOUS; SUBCUTANEOUS at 16:38

## 2019-07-30 RX ADMIN — TRAMADOL HYDROCHLORIDE 50 MG: 50 TABLET, FILM COATED ORAL at 17:41

## 2019-07-30 RX ADMIN — Medication 2 G: at 14:38

## 2019-07-30 RX ADMIN — FENTANYL CITRATE 50 MCG: 50 INJECTION INTRAMUSCULAR; INTRAVENOUS at 14:48

## 2019-07-30 SDOH — HEALTH STABILITY: MENTAL HEALTH: HOW OFTEN DO YOU HAVE A DRINK CONTAINING ALCOHOL?: NEVER

## 2019-07-30 ASSESSMENT — PULMONARY FUNCTION TESTS
PIF_VALUE: 5
PIF_VALUE: 4
PIF_VALUE: 4
PIF_VALUE: 5
PIF_VALUE: 13
PIF_VALUE: 13
PIF_VALUE: 4
PIF_VALUE: 11
PIF_VALUE: 13
PIF_VALUE: 5
PIF_VALUE: 14
PIF_VALUE: 13
PIF_VALUE: 6
PIF_VALUE: 6
PIF_VALUE: 4
PIF_VALUE: 4
PIF_VALUE: 20
PIF_VALUE: 10
PIF_VALUE: 4
PIF_VALUE: 1
PIF_VALUE: 1
PIF_VALUE: 14
PIF_VALUE: 13
PIF_VALUE: 1
PIF_VALUE: 7
PIF_VALUE: 3
PIF_VALUE: 15
PIF_VALUE: 3
PIF_VALUE: 14
PIF_VALUE: 5
PIF_VALUE: 0
PIF_VALUE: 0
PIF_VALUE: 12
PIF_VALUE: 5
PIF_VALUE: 5
PIF_VALUE: 6
PIF_VALUE: 0
PIF_VALUE: 10
PIF_VALUE: 5
PIF_VALUE: 4
PIF_VALUE: 2
PIF_VALUE: 4
PIF_VALUE: 13
PIF_VALUE: 5
PIF_VALUE: 5
PIF_VALUE: 1
PIF_VALUE: 3
PIF_VALUE: 6
PIF_VALUE: 5
PIF_VALUE: 6
PIF_VALUE: 4
PIF_VALUE: 14
PIF_VALUE: 6
PIF_VALUE: 8
PIF_VALUE: 6
PIF_VALUE: 13
PIF_VALUE: 4
PIF_VALUE: 15
PIF_VALUE: 4
PIF_VALUE: 20
PIF_VALUE: 6
PIF_VALUE: 2
PIF_VALUE: 4

## 2019-07-30 ASSESSMENT — PAIN DESCRIPTION - LOCATION
LOCATION: ARM

## 2019-07-30 ASSESSMENT — PAIN DESCRIPTION - ORIENTATION
ORIENTATION: RIGHT

## 2019-07-30 ASSESSMENT — PAIN DESCRIPTION - ONSET: ONSET: ON-GOING

## 2019-07-30 ASSESSMENT — PAIN SCALES - GENERAL
PAINLEVEL_OUTOF10: 8
PAINLEVEL_OUTOF10: 8
PAINLEVEL_OUTOF10: 9
PAINLEVEL_OUTOF10: 6
PAINLEVEL_OUTOF10: 5
PAINLEVEL_OUTOF10: 6
PAINLEVEL_OUTOF10: 7
PAINLEVEL_OUTOF10: 7

## 2019-07-30 ASSESSMENT — PAIN DESCRIPTION - PAIN TYPE
TYPE: SURGICAL PAIN

## 2019-07-30 ASSESSMENT — PAIN DESCRIPTION - FREQUENCY
FREQUENCY: CONTINUOUS
FREQUENCY: INTERMITTENT

## 2019-07-30 ASSESSMENT — PAIN - FUNCTIONAL ASSESSMENT: PAIN_FUNCTIONAL_ASSESSMENT: 0-10

## 2019-07-30 ASSESSMENT — PAIN DESCRIPTION - PROGRESSION
CLINICAL_PROGRESSION: NOT CHANGED
CLINICAL_PROGRESSION: GRADUALLY IMPROVING
CLINICAL_PROGRESSION: GRADUALLY WORSENING

## 2019-07-30 ASSESSMENT — PAIN DESCRIPTION - DESCRIPTORS
DESCRIPTORS: ACHING
DESCRIPTORS: THROBBING
DESCRIPTORS: THROBBING

## 2019-07-30 NOTE — ANESTHESIA PRE PROCEDURE
 sodium chloride flush 0.9 % injection 10 mL  10 mL Intravenous 2 times per day Krystle Barrera PA-C        sodium chloride flush 0.9 % injection 10 mL  10 mL Intravenous PRN Krystle Barrera PA-C        ceFAZolin (ANCEF) 2 g in dextrose 5 % 50 mL IVPB  2 g Intravenous On Call to [facility] BARBARA Barrera PA-C           Allergies: Allergies   Allergen Reactions    Sulfa Antibiotics Hives       Problem List:    Patient Active Problem List   Diagnosis Code    Microscopic hematuria R31.29    Cardiac murmur R01.1    Type 2 diabetes mellitus without complication, with long-term current use of insulin ([facility] [address] E11.9, Z79.4    Essential hypertension I10    Anxiety F41.9       Past Medical History:        Diagnosis Date    Anxiety     Cardiac murmur     Hypercholesterolemia     no medication recommended - just dietary recommendations    Hypertension     130's/80-90's at home, white coat hypertension, stress test 2007 prior to TKA at [facility] 112 Microscopic hematuria     Obesity (BMI 30-39. 9)     Snoring     no apnea, no waking coughing or choking, no am headache, BMI 36, neck circ.  15 inches    Type II or unspecified type diabetes mellitus without mention of complication, not stated as uncontrolled     FSBS         Past Surgical History:        Procedure Laterality Date    JOINT REPLACEMENT Left 2013    left hip replacement--Dr Don Sahni    KNEE ARTHROPLASTY Bilateral     left 2006 right 2007       Social History:    Social History     Tobacco Use    Smoking status: Never Smoker    Smokeless tobacco: Never Used   Substance Use Topics    Alcohol use: Never     Frequency: Never                                Counseling given: Not Answered      Vital Signs (Current):   Vitals:    07/30/19 1112 07/30/19 1150   BP: (!) 199/84 (!) 144/72   Pulse: 88    Resp: 12    Temp: 99.9 °F (37.7 °C)    TempSrc: Temporal    SpO2: 96%    Weight: 211 lb 12.8 oz (96.1 kg)    Height: 5' 5\" (1.651 m)

## 2019-07-30 NOTE — PROGRESS NOTES
32 61 16- Patient to PACU. Hooked up to monitor. Report from Zenon Dooley CRNA and Nida Burrows RN. Patient arouses to name. 0- CRNA medicated with Labetolol for elevated BP and removed LMA. 1545- Patient more awake and alert. Denies pain and nausea. BP remain elevated, medicated with 5 mg Labetolol. 1551- Patient awake and states pain 8/10. Medicated with 50 mcg Fentanyl. 1600- Patient states pain now 7/10, awake and stable, medicated with 50 mcg Fentanyl. 1610- Patient states she feels dizzy. Updated Dr. Farrah Tracey and informed of elevated BP readings. Ordered and medicated with 10 mg Labetolol. 1618- Blood sugar 175, informed Dr. Farrah Tracey, no new orders. 56- Updated Dr. Farrah Tracey on pt reporting more dizziness and BP's remain elevated. Ordered and medicated with 10 mg Hydralazine. 1630- Patient states no pain relief and rates it 9/10. Medicated with 0.5 mg Dilaudid. Denies nausea, tolerating ice chips. 1638- Pain 8/10, awake and vitals stable, medicated with 0.5 mg Dilaudid. [id number]- Patient states she feels like she is getting herself worked up. Laid back in stretcher, dimmed lights, and applied cool washcloth to head for comfort. Vitals stable and BP improving. 1650- Patient asleep, easy to arouse. No distress observed. Vitals remain stable. 1658- Patient awake and vitals stable, /79. No distress observed. Patient meets PACU discharge criteria. Transported to [facility] room 19 by RN in stable condition. Report to [facility] MOTT, Deedee Velasquez. Family notified.

## 2019-07-30 NOTE — H&P
[facility]  History and Physical Update    Pt Name: Timbo Gonzalez  MRN: [id number]  YOB: 1940  Date of evaluation: 7/30/2019    I have examined the patient and reviewed the H&P/Consult and there are no changes to the patient or plans.       Gavi Espinosa  Electronically signed 7/30/2019 at 2:35 PM
does have tenderness on palpation. She is  neurologically intact and neurovascularly intact. Sensory intact. Reflexes are intact. ASSESSMENT:  Right distal radius fracture. PLAN:  At this time, in conversation with Dr. Faizan Paul, she would like  to move forth with ORIF of the right distal radius. The risks and  benefits of surgery were discussed with her and she would like to  proceed. MADHURI Hall     D: 07/29/2019 17:58:19       T: 07/30/2019 4:49:25     /[email or web address]  Job#: [id number]     Doc#: [id number]    CC:

## 2019-09-10 RX ORDER — GLIMEPIRIDE 2 MG/1
TABLET ORAL
Qty: 180 TABLET | Refills: 0 | Status: SHIPPED | OUTPATIENT
Start: 2019-09-10 | End: 2019-09-13 | Stop reason: SDUPTHER

## 2019-09-10 RX ORDER — METOPROLOL TARTRATE 50 MG/1
TABLET, FILM COATED ORAL
Qty: 180 TABLET | Refills: 0 | Status: SHIPPED | OUTPATIENT
Start: 2019-09-10 | End: 2019-09-16 | Stop reason: SDUPTHER

## 2019-09-16 RX ORDER — GLIMEPIRIDE 2 MG/1
TABLET ORAL
Qty: 180 TABLET | Refills: 2 | Status: SHIPPED | OUTPATIENT
Start: 2019-09-16 | End: 2019-12-03 | Stop reason: SDUPTHER

## 2019-09-16 RX ORDER — METOPROLOL TARTRATE 50 MG/1
TABLET, FILM COATED ORAL
Qty: 180 TABLET | Refills: 0 | Status: SHIPPED | OUTPATIENT
Start: 2019-09-16 | End: 2019-12-17 | Stop reason: SDUPTHER

## 2019-09-16 RX ORDER — GLIMEPIRIDE 2 MG/1
TABLET ORAL
Qty: 180 TABLET | Refills: 0 | Status: SHIPPED | OUTPATIENT
Start: 2019-09-16 | End: 2019-12-17 | Stop reason: SDUPTHER

## 2019-09-16 NOTE — TELEPHONE ENCOUNTER
Last visit- 12/4/2018  Next visit- Visit date not found  Pt has CMP, A1C, and lipid lab work to complete.    Requested Prescriptions     Pending Prescriptions Disp Refills    glimepiride (AMARYL) 2 MG tablet [Pharmacy Med Name: GLIMEPIRIDE 2MG TAB] 180 tablet 2     Sig: TAKE 1 TABLET BY MOUTH TWICE DAILY

## 2019-10-07 RX ORDER — AMLODIPINE BESYLATE 5 MG/1
5 TABLET ORAL DAILY
Qty: 30 TABLET | Refills: 5 | Status: SHIPPED
Start: 2019-10-07 | End: 2020-02-25 | Stop reason: HOSPADM

## 2019-11-12 RX ORDER — RAMIPRIL 10 MG/1
CAPSULE ORAL
Qty: 90 CAPSULE | Refills: 0 | Status: SHIPPED
Start: 2019-11-12 | End: 2020-02-25 | Stop reason: HOSPADM

## 2019-12-03 ENCOUNTER — OFFICE VISIT (OUTPATIENT)
Dept: FAMILY MEDICINE CLINIC | Age: 79
End: 2019-12-03
Payer: MEDICARE

## 2019-12-03 VITALS
RESPIRATION RATE: 18 BRPM | HEIGHT: 65 IN | SYSTOLIC BLOOD PRESSURE: 138 MMHG | HEART RATE: 78 BPM | DIASTOLIC BLOOD PRESSURE: 82 MMHG | WEIGHT: 208.6 LBS | TEMPERATURE: 98.1 F | BODY MASS INDEX: 34.75 KG/M2

## 2019-12-03 DIAGNOSIS — E11.9 TYPE 2 DIABETES MELLITUS WITHOUT COMPLICATION, WITH LONG-TERM CURRENT USE OF INSULIN (HCC): ICD-10-CM

## 2019-12-03 DIAGNOSIS — R53.83 FATIGUE, UNSPECIFIED TYPE: Primary | ICD-10-CM

## 2019-12-03 DIAGNOSIS — F33.1 MODERATE EPISODE OF RECURRENT MAJOR DEPRESSIVE DISORDER (HCC): ICD-10-CM

## 2019-12-03 DIAGNOSIS — I10 ESSENTIAL HYPERTENSION: ICD-10-CM

## 2019-12-03 DIAGNOSIS — Z79.4 TYPE 2 DIABETES MELLITUS WITHOUT COMPLICATION, WITH LONG-TERM CURRENT USE OF INSULIN (HCC): ICD-10-CM

## 2019-12-03 PROCEDURE — 99214 OFFICE O/P EST MOD 30 MIN: CPT | Performed by: FAMILY MEDICINE

## 2019-12-03 PROCEDURE — 90653 IIV ADJUVANT VACCINE IM: CPT | Performed by: FAMILY MEDICINE

## 2019-12-03 PROCEDURE — G0008 ADMIN INFLUENZA VIRUS VAC: HCPCS | Performed by: FAMILY MEDICINE

## 2019-12-03 PROCEDURE — G0444 DEPRESSION SCREEN ANNUAL: HCPCS | Performed by: FAMILY MEDICINE

## 2019-12-03 RX ORDER — DULOXETIN HYDROCHLORIDE 30 MG/1
30 CAPSULE, DELAYED RELEASE ORAL DAILY
Qty: 30 CAPSULE | Refills: 1 | Status: SHIPPED | OUTPATIENT
Start: 2019-12-03 | End: 2020-01-07 | Stop reason: SDUPTHER

## 2019-12-03 ASSESSMENT — PATIENT HEALTH QUESTIONNAIRE - PHQ9
8. MOVING OR SPEAKING SO SLOWLY THAT OTHER PEOPLE COULD HAVE NOTICED. OR THE OPPOSITE, BEING SO FIGETY OR RESTLESS THAT YOU HAVE BEEN MOVING AROUND A LOT MORE THAN USUAL: 0
2. FEELING DOWN, DEPRESSED OR HOPELESS: 1
4. FEELING TIRED OR HAVING LITTLE ENERGY: 3
SUM OF ALL RESPONSES TO PHQ9 QUESTIONS 1 & 2: 4
10. IF YOU CHECKED OFF ANY PROBLEMS, HOW DIFFICULT HAVE THESE PROBLEMS MADE IT FOR YOU TO DO YOUR WORK, TAKE CARE OF THINGS AT HOME, OR GET ALONG WITH OTHER PEOPLE: 1
5. POOR APPETITE OR OVEREATING: 3
3. TROUBLE FALLING OR STAYING ASLEEP: 3
SUM OF ALL RESPONSES TO PHQ QUESTIONS 1-9: 17
6. FEELING BAD ABOUT YOURSELF - OR THAT YOU ARE A FAILURE OR HAVE LET YOURSELF OR YOUR FAMILY DOWN: 3
SUM OF ALL RESPONSES TO PHQ QUESTIONS 1-9: 17
7. TROUBLE CONCENTRATING ON THINGS, SUCH AS READING THE NEWSPAPER OR WATCHING TELEVISION: 0
1. LITTLE INTEREST OR PLEASURE IN DOING THINGS: 3
9. THOUGHTS THAT YOU WOULD BE BETTER OFF DEAD, OR OF HURTING YOURSELF: 1

## 2019-12-03 ASSESSMENT — COLUMBIA-SUICIDE SEVERITY RATING SCALE - C-SSRS
1. WITHIN THE PAST MONTH, HAVE YOU WISHED YOU WERE DEAD OR WISHED YOU COULD GO TO SLEEP AND NOT WAKE UP?: YES
3. HAVE YOU BEEN THINKING ABOUT HOW YOU MIGHT KILL YOURSELF?: NO
5. HAVE YOU STARTED TO WORK OUT OR WORKED OUT THE DETAILS OF HOW TO KILL YOURSELF? DO YOU INTEND TO CARRY OUT THIS PLAN?: NO
2. HAVE YOU ACTUALLY HAD ANY THOUGHTS OF KILLING YOURSELF?: NO
6. HAVE YOU EVER DONE ANYTHING, STARTED TO DO ANYTHING, OR PREPARED TO DO ANYTHING TO END YOUR LIFE?: NO
4. HAVE YOU HAD THESE THOUGHTS AND HAD SOME INTENTION OF ACTING ON THEM?: NO

## 2019-12-06 LAB
A/G RATIO: 1.3 (ref 1.5–2.5)
ABSOLUTE BASO #: 0 /CMM (ref 0–200)
ABSOLUTE EOS #: 100 /CMM (ref 0–500)
ABSOLUTE LYMPH #: 1900 /CMM (ref 1000–4800)
ABSOLUTE MONO #: 500 /CMM (ref 0–800)
ABSOLUTE NEUT #: 5800 /CMM (ref 1800–7700)
ALBUMIN SERPL-MCNC: 4 GM/DL (ref 3.5–5)
ALP BLD-CCNC: 65 IU/L (ref 39–118)
ALT SERPL-CCNC: 11 IU/L (ref 10–40)
ANION GAP SERPL CALCULATED.3IONS-SCNC: 8 MMOL/L (ref 4–12)
AST SERPL-CCNC: 11 IU/L (ref 15–41)
BASOPHILS RELATIVE PERCENT: 0.6 % (ref 0–2)
BILIRUB SERPL-MCNC: 0.4 MG/DL (ref 0.2–1)
BUN BLDV-MCNC: 13 MG/DL (ref 7–20)
CALCIUM SERPL-MCNC: 9.4 MG/DL (ref 8.8–10.5)
CHLORIDE BLD-SCNC: 102 MEQ/L (ref 101–111)
CHOLESTEROL/HDL RELATIVE RISK: 4.5 (ref 4–4.4)
CHOLESTEROL: 230 MG/DL
CO2: 28 MEQ/L (ref 21–32)
CREAT SERPL-MCNC: 0.5 MG/DL (ref 0.6–1.3)
CREATININE CLEARANCE: >60
DIRECT-LDL / HDL RISK: 3.3
EOSINOPHILS RELATIVE PERCENT: 1.4 % (ref 0–6)
ESTIMATED AVERAGE GLUCOSE: 183 MG/DL
GLUCOSE: 152 MG/DL (ref 70–110)
HBA1C MFR BLD: 8 % (ref 4.4–6.4)
HCT VFR BLD CALC: 35 % (ref 35–44)
HDLC SERPL-MCNC: 51 MG/DL
HEMOGLOBIN: 11.3 GM/DL (ref 12–15)
HYPOCHROMIA: ABNORMAL
LDL CHOLESTEROL DIRECT: 173 MG/DL
LYMPHOCYTES RELATIVE PERCENT: 22.9 % (ref 15–45)
MCH RBC QN AUTO: 25.1 PG (ref 27.5–33)
MCHC RBC AUTO-ENTMCNC: 32.3 GM/DL (ref 33–36)
MCV RBC AUTO: 77.8 CU MIC (ref 80–97)
MONOCYTES RELATIVE PERCENT: 5.5 % (ref 2–10)
NEUTROPHILS RELATIVE PERCENT: 69.6 % (ref 40–70)
NUCLEATED RBCS: 0 /100 WBC
PDW BLD-RTO: 18 % (ref 12–16)
PLATELET # BLD: 346 TH/CMM (ref 150–400)
POTASSIUM SERPL-SCNC: 3.5 MEQ/L (ref 3.6–5)
RBC # BLD: 4.51 MIL/CMM (ref 4–5.1)
SODIUM BLD-SCNC: 138 MEQ/L (ref 135–145)
T4 FREE: 0.68 NG/DL (ref 0.61–1.12)
TOTAL PROTEIN: 7.2 G/DL (ref 6.2–8)
TRIGL SERPL-MCNC: 135 MG/DL
TSH SERPL DL<=0.05 MIU/L-ACNC: 3.54 MCIU/ML (ref 0.49–4.67)
VLDLC SERPL CALC-MCNC: 27 MG/DL
WBC # BLD: 8.3 TH/CMM (ref 4.4–10.5)

## 2019-12-08 ASSESSMENT — ENCOUNTER SYMPTOMS
SORE THROAT: 0
NAUSEA: 0
VOMITING: 0
EYES NEGATIVE: 1
BACK PAIN: 0
COUGH: 0
CHEST TIGHTNESS: 0
SHORTNESS OF BREATH: 0
RHINORRHEA: 0
ABDOMINAL PAIN: 0
DIARRHEA: 0

## 2019-12-17 RX ORDER — METOPROLOL TARTRATE 50 MG/1
TABLET, FILM COATED ORAL
Qty: 180 TABLET | Refills: 0 | Status: SHIPPED
Start: 2019-12-17 | End: 2020-02-25 | Stop reason: DRUGHIGH

## 2019-12-17 RX ORDER — GLIMEPIRIDE 2 MG/1
TABLET ORAL
Qty: 180 TABLET | Refills: 0 | Status: SHIPPED
Start: 2019-12-17 | End: 2020-02-25 | Stop reason: HOSPADM

## 2019-12-24 ENCOUNTER — TELEPHONE (OUTPATIENT)
Dept: FAMILY MEDICINE CLINIC | Age: 79
End: 2019-12-24

## 2020-01-07 ENCOUNTER — OFFICE VISIT (OUTPATIENT)
Dept: FAMILY MEDICINE CLINIC | Age: 80
End: 2020-01-07
Payer: MEDICARE

## 2020-01-07 VITALS
HEART RATE: 76 BPM | SYSTOLIC BLOOD PRESSURE: 140 MMHG | WEIGHT: 207 LBS | BODY MASS INDEX: 34.45 KG/M2 | DIASTOLIC BLOOD PRESSURE: 84 MMHG | RESPIRATION RATE: 20 BRPM

## 2020-01-07 PROCEDURE — 99214 OFFICE O/P EST MOD 30 MIN: CPT | Performed by: FAMILY MEDICINE

## 2020-01-07 RX ORDER — DULOXETIN HYDROCHLORIDE 30 MG/1
30 CAPSULE, DELAYED RELEASE ORAL DAILY
Qty: 90 CAPSULE | Refills: 3 | Status: SHIPPED | OUTPATIENT
Start: 2020-01-07 | End: 2021-04-15

## 2020-01-07 RX ORDER — ATORVASTATIN CALCIUM 40 MG/1
40 TABLET, FILM COATED ORAL DAILY
Qty: 90 TABLET | Refills: 1 | Status: SHIPPED | OUTPATIENT
Start: 2020-01-07 | End: 2020-08-06

## 2020-01-07 ASSESSMENT — PATIENT HEALTH QUESTIONNAIRE - PHQ9
SUM OF ALL RESPONSES TO PHQ QUESTIONS 1-9: 0
SUM OF ALL RESPONSES TO PHQ9 QUESTIONS 1 & 2: 0
1. LITTLE INTEREST OR PLEASURE IN DOING THINGS: 0
2. FEELING DOWN, DEPRESSED OR HOPELESS: 0
SUM OF ALL RESPONSES TO PHQ QUESTIONS 1-9: 0

## 2020-01-12 ASSESSMENT — ENCOUNTER SYMPTOMS
CHEST TIGHTNESS: 0
NAUSEA: 0
BACK PAIN: 0
EYES NEGATIVE: 1
COUGH: 0
SHORTNESS OF BREATH: 0
RHINORRHEA: 0
ABDOMINAL PAIN: 0
SORE THROAT: 0
VOMITING: 0
DIARRHEA: 0

## 2020-01-12 NOTE — PROGRESS NOTES
[place] [address] [place] [facility] [facility] [address]  Dept: [phone]  Dept Fax: [phone]  Loc: [phone]  PROGRESS NOTE      VisitDate: 1/7/2020    Meg De La Cruz is a 67 y.o. female who presents today for:     Chief Complaint   Patient presents with    1 Month Follow-Up     Depression, HTN, DM. After starting cymbalta. States med is helping lift mood    Discuss Labs    Medication Refill         Subjective:  HPI  Follow-up depression doing well with medications follow-up diabetes hypertension hyperlipidemia stable. History of cardiac murmur    Review of Systems   Constitutional: Negative for activity change, appetite change, fatigue and fever. HENT: Negative for congestion, rhinorrhea and sore throat. Eyes: Negative. Respiratory: Negative for cough, chest tightness and shortness of breath. Cardiovascular: Negative for chest pain and palpitations. Gastrointestinal: Negative for abdominal pain, diarrhea, nausea and vomiting. Genitourinary: Negative for dysuria and urgency. Musculoskeletal: Negative for arthralgias and back pain. Neurological: Negative for dizziness and headaches. Psychiatric/Behavioral: Negative for dysphoric mood. The patient is not nervous/anxious. Past Medical History:   Diagnosis Date    Anxiety     Cardiac murmur     Hypercholesterolemia     no medication recommended - just dietary recommendations    Hypertension     130's/80-90's at home, white coat hypertension, stress test 2007 prior to TKA at [facility] 112 Microscopic hematuria     Obesity (BMI 30-39. 9)     Snoring     no apnea, no waking coughing or choking, no am headache, BMI 36, neck circ.  15 inches    Type II or unspecified type diabetes mellitus without mention of complication, not stated as uncontrolled     FSBS        Past Surgical History:   Procedure Laterality Date    JOINT REPLACEMENT Left 2013    left hip replacement--Dr Savage Necessary    KNEE ARTHROPLASTY Bilateral     left 2006 right 2007    ORIF DISTAL RADIUS FRACTURE Right 7/30/2019    ORIF RIGHT DISTAL RADIUS performed by Yael Alberts MD at [address] [place] [address] History   Problem Relation Age of Onset    Heart Disease Mother 79        CABG, [place]    Stroke Mother     Heart Disease Father     Cancer Father         lung    Arthritis Father     Cancer Sister         brain tumor    Arthritis Brother     Arthritis Sister      Social History     Tobacco Use    Smoking status: Never Smoker    Smokeless tobacco: Never Used   Substance Use Topics    Alcohol use: Never     Frequency: Never      Current Outpatient Medications   Medication Sig Dispense Refill    DULoxetine (CYMBALTA) 30 MG extended release capsule Take 1 capsule by mouth daily 90 capsule 3    atorvastatin (LIPITOR) 40 MG tablet Take 1 tablet by mouth daily 90 tablet 1    glimepiride (AMARYL) 2 MG tablet TAKE 1 TABLET BY MOUTH TWICE DAILY 180 tablet 0    metoprolol tartrate (LOPRESSOR) 50 MG tablet TAKE 1 TABLET BY MOUTH TWICE DAILY 180 tablet 0    ramipril (ALTACE) 10 MG capsule TAKE 1 CAPSULE BY MOUTH ONCE DAILY 90 capsule 0    metFORMIN (GLUCOPHAGE) 1000 MG tablet Take 1 tablet by mouth 2 times daily 180 tablet 1    amLODIPine (NORVASC) 5 MG tablet Take 1 tablet by mouth daily 30 tablet 5    LANTUS SOLOSTAR 100 UNIT/ML injection pen Inject 18 Units into the skin daily 5 pen 5    Insulin Pen Needle 31G X 8 MM MISC 1 each by Does not apply route daily 100 each 3    aspirin 325 MG tablet Take 325 mg by mouth daily      Omega-3 Fatty Acids (FISH OIL) 1000 MG CAPS Take 3,000 mg by mouth 2 times daily      Multiple Vitamins-Minerals (OCUVITE PRESERVISION PO) Take by mouth daily      Multiple Vitamins-Minerals (THERAPEUTIC MULTIVITAMIN-MINERALS) tablet Take 1 tablet by mouth daily. No current facility-administered medications for this visit.       Allergies   Allergen Reactions    Sulfa Antibiotics Hives     Health Maintenance   Topic Date Due    DTaP/Tdap/Td vaccine (1 - Tdap) [birth year]/1952    Shingles Vaccine (1 of 2) 04/22/1991    DEXA (modify frequency per FRAX score)  04/22/2006    Annual Wellness Visit (AWV)  06/23/2019    Pneumococcal 65+ years Vaccine (2 of 2 - PPSV23) 09/25/2019    Lipid screen  12/06/2020    Potassium monitoring  12/06/2020    Creatinine monitoring  12/06/2020    Flu vaccine  Completed         Objective:     Physical Exam  Constitutional:       General: She is not in acute distress. Appearance: She is well-developed. She is not diaphoretic. HENT:      Head: Normocephalic and atraumatic. Eyes:      General: No scleral icterus. Conjunctiva/sclera: Conjunctivae normal.   Neck:      Thyroid: No thyromegaly. Vascular: No JVD. Comments: No bruits  Cardiovascular:      Rate and Rhythm: Normal rate and regular rhythm. Heart sounds: Normal heart sounds. Pulmonary:      Effort: Pulmonary effort is normal. No respiratory distress. Breath sounds: Normal breath sounds. No wheezing or rales. Abdominal:      Palpations: Abdomen is soft. There is no mass. Tenderness: There is no tenderness. There is no guarding. Musculoskeletal:         General: No tenderness. Skin:     General: Skin is warm and dry. Findings: No rash. Neurological:      Mental Status: She is alert and oriented to person, place, and time. Cranial Nerves: No cranial nerve deficit. BP (!) 140/84 (Site: Right Upper Arm)   Pulse 76   Resp 20   Wt 207 lb (93.9 kg)   BMI 34.45 kg/m²       Impression/Plan:  1. Hyperlipidemia, unspecified hyperlipidemia type    2. Type 2 diabetes mellitus without complication, with long-term current use of insulin ([facility] [address]    3. Essential hypertension    4.  Cardiac murmur      Requested Prescriptions     Signed Prescriptions Disp Refills    DULoxetine (CYMBALTA) 30 MG extended release capsule 90 capsule 3     Sig: Take 1 capsule by mouth daily    atorvastatin (LIPITOR) 40 MG tablet 90 tablet 1     Sig: Take 1 tablet by mouth daily     Orders Placed This Encounter   Procedures    Hemoglobin A1C     Standing Status:   Future     Standing Expiration Date:   1/6/2021    Comprehensive Metabolic Panel     Standing Status:   Future     Standing Expiration Date:   1/6/2021    Lipid Panel     Standing Status:   Future     Standing Expiration Date:   1/6/2021     Order Specific Question:   Is Patient Fasting?/# of Hours     Answer:   yes/12       Patient giveneducational materials - see patient instructions. Discussed use, benefit, and side effects of prescribed medications. All patient questions answered. Pt voiced understanding. Reviewed health maintenance. Patient agreedwith treatment plan. Follow up as directed. **This report has been created using voice recognition software. It may contain minor errorswhich are inherent in voice recognition technology. **       Electronically signed by Claudia Olmos MD on 1/12/2020 at 2:52 PM

## 2020-02-20 ENCOUNTER — TELEPHONE (OUTPATIENT)
Dept: FAMILY MEDICINE CLINIC | Age: 80
End: 2020-02-20

## 2020-02-25 ENCOUNTER — OFFICE VISIT (OUTPATIENT)
Dept: FAMILY MEDICINE CLINIC | Age: 80
End: 2020-02-25
Payer: MEDICARE

## 2020-02-25 VITALS
DIASTOLIC BLOOD PRESSURE: 70 MMHG | RESPIRATION RATE: 20 BRPM | HEART RATE: 68 BPM | BODY MASS INDEX: 34.11 KG/M2 | SYSTOLIC BLOOD PRESSURE: 120 MMHG | TEMPERATURE: 97.7 F | WEIGHT: 205 LBS

## 2020-02-25 PROCEDURE — 99496 TRANSJ CARE MGMT HIGH F2F 7D: CPT | Performed by: FAMILY MEDICINE

## 2020-02-25 PROCEDURE — 1111F DSCHRG MED/CURRENT MED MERGE: CPT | Performed by: FAMILY MEDICINE

## 2020-02-25 NOTE — PROGRESS NOTES
Post-Discharge Transitional Care Management Services or Hospital Follow Up      Leah Logan   YOB: 1940    Date of Office Visit:  2/25/2020  Date of Hospital Admission: 2/16  Date of Hospital Discharge: 2/18    Care management risk score Rising risk (score 2-5) and Complex Care (Scores >=6): 1     Non face to face  following discharge, date last encounter closed (first attempt may have been earlier): *No documented post hospital discharge outreach found in the last 14 days *No documented post hospital discharge outreach found in the last 14 days    Call initiated 2 business days of discharge: *No response recorded in the last 14 days    Patient Active Problem List   Diagnosis    Microscopic hematuria    Cardiac murmur    Type 2 diabetes mellitus without complication, with long-term current use of insulin ([facility] [address]    Essential hypertension    Anxiety    Closed fracture of left distal radius and ulna, initial encounter       Allergies   Allergen Reactions    Sulfa Antibiotics Hives       Medications listed as ordered at the time of discharge from hospital  yes    Medications marked \"taking\" at this time  Outpatient Medications Marked as Taking for the 2/25/20 encounter (Office Visit) with Gilmer Mack MD   Medication Sig Dispense Refill    apixaban (ELIQUIS) 5 MG TABS tablet Take 5 mg by mouth 2 times daily       metoprolol tartrate (LOPRESSOR) 25 MG tablet Take 75 mg by mouth 2 times daily       metFORMIN (GLUCOPHAGE) 1000 MG tablet Take 1 tablet by mouth 2 times daily 180 tablet 3    DULoxetine (CYMBALTA) 30 MG extended release capsule Take 1 capsule by mouth daily 90 capsule 3    atorvastatin (LIPITOR) 40 MG tablet Take 1 tablet by mouth daily 90 tablet 1    LANTUS SOLOSTAR 100 UNIT/ML injection pen Inject 18 Units into the skin daily (Patient taking differently: Inject 24 Units into the skin daily ) 5 pen 5    Insulin Pen Needle 31G X 8 MM MISC 1 each by Does not apply route daily 100 each 3    Omega-3 Fatty Acids (FISH OIL) 1000 MG CAPS Take 3,000 mg by mouth 2 times daily      Multiple Vitamins-Minerals (OCUVITE PRESERVISION PO) Take by mouth daily      Multiple Vitamins-Minerals (THERAPEUTIC MULTIVITAMIN-MINERALS) tablet Take 1 tablet by mouth daily. Medications patient taking as of now reconciled against medications ordered at time of hospital discharge: Yes    Chief Complaint   Patient presents with    [place] [facility]     D/c Jaswinder 2/18, UTI and new onset A-Fib-Was started on eliquis, metoprolol tart dose increased. Seeing Dr. Jessica Braun 2/27 and has Jaswinder     Medication Refill       History of Present illness - Follow up of Hospital diagnosis(es):    Diagnosis Orders   1. New onset a-fib ([address]     2. Urinary tract infection without hematuria, site unspecified  POCT Urinalysis No Micro (Auto)    Urine Rt Reflex To Culture         Inpatient course: Discharge summary reviewed- see chart. Interval history/Current status: stable, heart rate controlled still feels very fatigued but denies urinary symptoms. Unable to void in the office will have home health obtain a repeat UA. She is completed her antibiotics. She has follow-up with cardiology. Vitals:    02/25/20 1020   BP: 120/70   Pulse: 68   Resp: 20   Temp: 97.7 °F (36.5 °C)   TempSrc: Oral   Weight: 205 lb (93 kg)     Body mass index is 34.11 kg/m². Wt Readings from Last 3 Encounters:   02/25/20 205 lb (93 kg)   01/07/20 207 lb (93.9 kg)   12/03/19 208 lb 9.6 oz (94.6 kg)     BP Readings from Last 3 Encounters:   02/25/20 120/70   01/07/20 (!) 140/84   12/03/19 138/82       A comprehensive review of systems was negative except for what was noted in the HPI.     Physical Exam:  General Appearance: alert and oriented to person, place and time, well developed and well- nourished, in no acute distress  Skin: warm and dry, no rash or erythema  Head: normocephalic and atraumatic  Eyes: pupils equal, round, and reactive to light, extraocular eye movements intact, conjunctivae normal  ENT: tympanic membrane, external ear and ear canal normal bilaterally, nose without deformity, nasal mucosa and turbinates normal without polyps  Neck: supple and non-tender without mass, no thyromegaly or thyroid nodules, no cervical lymphadenopathy  Pulmonary/Chest: clear to auscultation bilaterally- no wheezes, rales or rhonchi, normal air movement, no respiratory distress  Cardiovascular: normal rate,irreg regular rhythm, normal S1 and S2, no murmurs, rubs, clicks, or gallops, distal pulses intact, no carotid bruits  Abdomen: soft, non-tender, non-distended, normal bowel sounds, no masses or organomegaly  Extremities: no cyanosis, clubbing or edema  Musculoskeletal: normal range of motion, no joint swelling, deformity or tenderness  Neurologic: reflexes normal and symmetric, no cranial nerve deficit, gait, coordination and speech normal    Assessment/Plan:   Diagnosis Orders   1. New onset a-fib ([address]     2. Urinary tract infection without hematuria, site unspecified  POCT Urinalysis No Micro (Auto)    Urine Rt Reflex To Culture           Medical Decision Making: high complexity    Discussed the need for follow-up UA to document resolution since she had such a severe infection. Also discussed that she is still in atrial fibrillation although her heart rate is controlled.   She is to continue her anticoagulation and follow-up with cardiology

## 2020-03-02 LAB
APPEARANCE: ABNORMAL
BACTERIA: ABNORMAL
BILIRUBIN URINE: NEGATIVE
COLOR: YELLOW
GLUCOSE URINE: NEGATIVE
KETONES, URINE: NEGATIVE
LEUKOCYTES, UA: ABNORMAL
MUCUS: PRESENT
NITRITE, URINE: POSITIVE
PH, URINE: 7 (ref 5–8)
PROTEIN, URINE: NEGATIVE
RBC URINE: ABNORMAL /HPF
SPECIFIC GRAVITY, URINE: 1.01 (ref 1–1.03)
SQUAMOUS EPITHELIAL: ABNORMAL /HPF
URINALYSIS REFLEX: YES
URINE HGB: ABNORMAL
UROBILINOGEN, URINE: ABNORMAL (ref 0.2–1)
WBC URINE: ABNORMAL /HPF

## 2020-03-04 ENCOUNTER — TELEPHONE (OUTPATIENT)
Dept: FAMILY MEDICINE CLINIC | Age: 80
End: 2020-03-04

## 2020-03-04 LAB — URINE CULTURE REFLEX: NORMAL

## 2020-03-04 RX ORDER — CEPHALEXIN 500 MG/1
500 CAPSULE ORAL 3 TIMES DAILY
Qty: 30 CAPSULE | Refills: 0 | Status: SHIPPED | OUTPATIENT
Start: 2020-03-04 | End: 2020-03-14

## 2020-03-09 ENCOUNTER — TELEPHONE (OUTPATIENT)
Dept: FAMILY MEDICINE CLINIC | Age: 80
End: 2020-03-09

## 2020-03-09 NOTE — TELEPHONE ENCOUNTER
Patient called stating she has been short of breath this morning and would like to know if the Keflex she has been taking could be causing this. She wants to avoid a trip to the ER if its not necessary. Please advise.

## 2020-03-31 ENCOUNTER — NURSE TRIAGE (OUTPATIENT)
Dept: OTHER | Facility: CLINIC | Age: 80
End: 2020-03-31

## 2020-04-13 ENCOUNTER — HOSPITAL ENCOUNTER (EMERGENCY)
Age: 80
Discharge: HOSPICE | End: 2020-04-13
Payer: MEDICARE

## 2020-04-13 VITALS
RESPIRATION RATE: 16 BRPM | DIASTOLIC BLOOD PRESSURE: 92 MMHG | TEMPERATURE: 97.7 F | OXYGEN SATURATION: 97 % | HEART RATE: 74 BPM | SYSTOLIC BLOOD PRESSURE: 179 MMHG

## 2020-04-13 LAB
EKG ATRIAL RATE: 77 BPM
EKG Q-T INTERVAL: 428 MS
EKG QRS DURATION: 86 MS
EKG QTC CALCULATION (BAZETT): 471 MS
EKG R AXIS: -20 DEGREES
EKG T AXIS: 139 DEGREES
EKG VENTRICULAR RATE: 73 BPM

## 2020-04-13 PROCEDURE — 99215 OFFICE O/P EST HI 40 MIN: CPT

## 2020-04-13 PROCEDURE — 2580000003 HC RX 258: Performed by: NURSE PRACTITIONER

## 2020-04-13 PROCEDURE — 99213 OFFICE O/P EST LOW 20 MIN: CPT | Performed by: NURSE PRACTITIONER

## 2020-04-13 PROCEDURE — 93005 ELECTROCARDIOGRAM TRACING: CPT | Performed by: NURSE PRACTITIONER

## 2020-04-13 RX ORDER — SODIUM CHLORIDE 9 MG/ML
INJECTION, SOLUTION INTRAVENOUS CONTINUOUS
Status: DISCONTINUED | OUTPATIENT
Start: 2020-04-13 | End: 2020-04-13 | Stop reason: HOSPADM

## 2020-04-13 RX ADMIN — SODIUM CHLORIDE 20 ML/HR: 9 INJECTION, SOLUTION INTRAVENOUS at 11:43

## 2020-04-13 ASSESSMENT — ENCOUNTER SYMPTOMS
NAUSEA: 0
CHEST TIGHTNESS: 0
SHORTNESS OF BREATH: 1
COUGH: 0
DIARRHEA: 0
VOMITING: 0
ABDOMINAL PAIN: 0

## 2020-04-14 ENCOUNTER — TELEPHONE (OUTPATIENT)
Dept: FAMILY MEDICINE CLINIC | Age: 80
End: 2020-04-14

## 2020-04-14 PROCEDURE — 93010 ELECTROCARDIOGRAM REPORT: CPT | Performed by: INTERNAL MEDICINE

## 2020-04-17 NOTE — TELEPHONE ENCOUNTER
Magda Huang called requesting a refill on the following medications:  Requested Prescriptions     Pending Prescriptions Disp Refills    metFORMIN (GLUCOPHAGE) 1000 MG tablet 180 tablet 3     Sig: Take 1 tablet by mouth 2 times daily     Pharmacy verified:  [email or web address]      Date of last visit: 01/07/20  Date of next visit (if applicable): Visit date not found

## 2020-06-17 RX ORDER — INSULIN GLARGINE 100 [IU]/ML
24 INJECTION, SOLUTION SUBCUTANEOUS DAILY
Qty: 15 PEN | Refills: 3 | Status: SHIPPED | OUTPATIENT
Start: 2020-06-17 | End: 2021-03-11 | Stop reason: DRUGHIGH

## 2020-06-24 ENCOUNTER — TELEPHONE (OUTPATIENT)
Dept: FAMILY MEDICINE CLINIC | Age: 80
End: 2020-06-24

## 2020-06-24 NOTE — LETTER
[address]  [address]  [facility] [address]  Phone: [phone]  Fax: [phone]    Jose Argueta MD        June 24, 2020     Patient: Susan Oliveira   YOB: 1940   Date of Visit: 6/24/2020       To Whom It May Concern: It is my medical opinion that Fela Padron requires a disability parking placard for the following reasons:  She cannot walk 200 feet without stopping to rest.  Duration of need: 2 years    If you have any questions or concerns, please don't hesitate to call.     Sincerely,        Jose Argueta MD

## 2020-08-06 RX ORDER — ATORVASTATIN CALCIUM 40 MG/1
TABLET, FILM COATED ORAL
Qty: 90 TABLET | Refills: 0 | Status: SHIPPED | OUTPATIENT
Start: 2020-08-06 | End: 2020-08-06 | Stop reason: SDUPTHER

## 2020-08-06 RX ORDER — ATORVASTATIN CALCIUM 40 MG/1
TABLET, FILM COATED ORAL
Qty: 90 TABLET | Refills: 3 | Status: SHIPPED | OUTPATIENT
Start: 2020-08-06 | End: 2021-04-15 | Stop reason: SDUPTHER

## 2020-08-06 NOTE — TELEPHONE ENCOUNTER
Haile Erazo called requesting a refill on the following medications:  Requested Prescriptions     Pending Prescriptions Disp Refills    atorvastatin (LIPITOR) 40 MG tablet 90 tablet 0     Pharmacy verified:  [facility]     Date of last visit: 02/25/2020  Date of next visit (if applicable): Visit date not found

## 2020-09-30 RX ORDER — APIXABAN 5 MG/1
TABLET, FILM COATED ORAL
Qty: 60 TABLET | Refills: 0 | OUTPATIENT
Start: 2020-09-30

## 2021-03-03 NOTE — TELEPHONE ENCOUNTER
Keely Christianson called requesting a refill on the following medications:  Requested Prescriptions     Pending Prescriptions Disp Refills    metFORMIN (GLUCOPHAGE) 1000 MG tablet 180 tablet 3     Sig: Take 1 tablet by mouth 2 times daily     Pharmacy verified: [facility]      Date of last visit: 2/25/2020  Date of next visit (if applicable): Visit date not found

## 2021-03-11 ENCOUNTER — OFFICE VISIT (OUTPATIENT)
Dept: FAMILY MEDICINE CLINIC | Age: 81
End: 2021-03-11
Payer: MEDICARE

## 2021-03-11 VITALS
DIASTOLIC BLOOD PRESSURE: 78 MMHG | WEIGHT: 197 LBS | HEART RATE: 68 BPM | HEIGHT: 64 IN | BODY MASS INDEX: 33.63 KG/M2 | SYSTOLIC BLOOD PRESSURE: 130 MMHG | RESPIRATION RATE: 16 BRPM

## 2021-03-11 DIAGNOSIS — I10 ESSENTIAL HYPERTENSION: ICD-10-CM

## 2021-03-11 DIAGNOSIS — I48.91 ATRIAL FIBRILLATION, UNSPECIFIED TYPE (HCC): ICD-10-CM

## 2021-03-11 DIAGNOSIS — Z79.4 TYPE 2 DIABETES MELLITUS WITHOUT COMPLICATION, WITH LONG-TERM CURRENT USE OF INSULIN (HCC): Primary | ICD-10-CM

## 2021-03-11 DIAGNOSIS — G47.00 INSOMNIA, UNSPECIFIED TYPE: ICD-10-CM

## 2021-03-11 DIAGNOSIS — E11.9 TYPE 2 DIABETES MELLITUS WITHOUT COMPLICATION, WITH LONG-TERM CURRENT USE OF INSULIN (HCC): Primary | ICD-10-CM

## 2021-03-11 LAB
CHP ED QC CHECK: ABNORMAL
GLUCOSE BLD-MCNC: 200 MG/DL
HBA1C MFR BLD: 8.6 %

## 2021-03-11 PROCEDURE — 83036 HEMOGLOBIN GLYCOSYLATED A1C: CPT | Performed by: FAMILY MEDICINE

## 2021-03-11 PROCEDURE — 82962 GLUCOSE BLOOD TEST: CPT | Performed by: FAMILY MEDICINE

## 2021-03-11 PROCEDURE — 3052F HG A1C>EQUAL 8.0%<EQUAL 9.0%: CPT | Performed by: FAMILY MEDICINE

## 2021-03-11 PROCEDURE — 99214 OFFICE O/P EST MOD 30 MIN: CPT | Performed by: FAMILY MEDICINE

## 2021-03-11 RX ORDER — POTASSIUM CHLORIDE 750 MG/1
10 CAPSULE, EXTENDED RELEASE ORAL SEE ADMIN INSTRUCTIONS
COMMUNITY
End: 2021-08-18 | Stop reason: SDUPTHER

## 2021-03-11 RX ORDER — ISOSORBIDE MONONITRATE 30 MG/1
30 TABLET, EXTENDED RELEASE ORAL DAILY
COMMUNITY

## 2021-03-11 RX ORDER — AMLODIPINE BESYLATE 5 MG/1
1 TABLET ORAL DAILY
COMMUNITY
Start: 2020-04-13 | End: 2021-09-13 | Stop reason: SDUPTHER

## 2021-03-11 RX ORDER — INSULIN GLARGINE 100 [IU]/ML
30 INJECTION, SOLUTION SUBCUTANEOUS DAILY
Qty: 15 PEN | Refills: 3 | Status: SHIPPED
Start: 2021-03-11 | End: 2021-10-06 | Stop reason: SDUPTHER

## 2021-03-11 RX ORDER — LABETALOL 100 MG/1
100 TABLET, FILM COATED ORAL 2 TIMES DAILY
Status: ON HOLD | COMMUNITY
End: 2021-08-05 | Stop reason: HOSPADM

## 2021-03-11 RX ORDER — RAMIPRIL 10 MG/1
20 CAPSULE ORAL DAILY
COMMUNITY
Start: 2020-04-13 | End: 2021-08-18 | Stop reason: DRUGHIGH

## 2021-03-11 RX ORDER — FUROSEMIDE 40 MG/1
40 TABLET ORAL SEE ADMIN INSTRUCTIONS
COMMUNITY

## 2021-03-11 RX ORDER — AMIODARONE HYDROCHLORIDE 200 MG/1
1 TABLET ORAL DAILY
COMMUNITY
Start: 2020-04-21

## 2021-03-11 RX ORDER — MAGNESIUM OXIDE 400 MG/1
400 TABLET ORAL DAILY
COMMUNITY

## 2021-03-11 RX ORDER — ZOLPIDEM TARTRATE 5 MG/1
5 TABLET ORAL NIGHTLY PRN
Qty: 30 TABLET | Refills: 1 | Status: SHIPPED | OUTPATIENT
Start: 2021-03-11 | End: 2021-04-10

## 2021-03-11 ASSESSMENT — COLUMBIA-SUICIDE SEVERITY RATING SCALE - C-SSRS
6. HAVE YOU EVER DONE ANYTHING, STARTED TO DO ANYTHING, OR PREPARED TO DO ANYTHING TO END YOUR LIFE?: NO
2. HAVE YOU ACTUALLY HAD ANY THOUGHTS OF KILLING YOURSELF?: NO

## 2021-03-11 ASSESSMENT — PATIENT HEALTH QUESTIONNAIRE - PHQ9
1. LITTLE INTEREST OR PLEASURE IN DOING THINGS: 3
7. TROUBLE CONCENTRATING ON THINGS, SUCH AS READING THE NEWSPAPER OR WATCHING TELEVISION: 0
4. FEELING TIRED OR HAVING LITTLE ENERGY: 3
SUM OF ALL RESPONSES TO PHQ9 QUESTIONS 1 & 2: 6
10. IF YOU CHECKED OFF ANY PROBLEMS, HOW DIFFICULT HAVE THESE PROBLEMS MADE IT FOR YOU TO DO YOUR WORK, TAKE CARE OF THINGS AT HOME, OR GET ALONG WITH OTHER PEOPLE: 3
3. TROUBLE FALLING OR STAYING ASLEEP: 2

## 2021-03-13 ASSESSMENT — ENCOUNTER SYMPTOMS
ABDOMINAL PAIN: 0
COUGH: 0
SHORTNESS OF BREATH: 0
BACK PAIN: 0
SORE THROAT: 0
NAUSEA: 0
CHEST TIGHTNESS: 0
EYES NEGATIVE: 1
DIARRHEA: 0
RHINORRHEA: 0
VOMITING: 0

## 2021-03-13 NOTE — PROGRESS NOTES
[address] [place] [facility] [address]  Dept: [phone]  Dept Fax: [phone]  Loc: [phone]  PROGRESS NOTE      VisitDate: 3/11/2021    Alma Chin is a 79 y.o. female who presents today for:     Chief Complaint   Patient presents with    Check-Up     DM, a-fib. No recent labs, [facility].  Medication Refill    Depression     daughter is living with her x 4 months and causing a lot of stress. Subjective:  HPI  Follow-up diabetes, blood sugar is uncontrolled hemoglobin A1c 8.6. Glucose in the office was over 200. Has been a little sketchy with her meds and diet. Under a lot of stress as her daughter who has become disabled due to a ankle fracture is now living with her. This is created a lot of emotional distress for her. Follow-up hypertension stable and well-controlled. Follow-up A. fib, rate controlled asymptomatic. Having difficulty initiating maintaining sleep which she feels is due to her stress    Review of Systems   Constitutional: Negative for activity change, appetite change, fatigue and fever. HENT: Negative for congestion, rhinorrhea and sore throat. Eyes: Negative. Respiratory: Negative for cough, chest tightness and shortness of breath. Cardiovascular: Negative for chest pain and palpitations. Gastrointestinal: Negative for abdominal pain, diarrhea, nausea and vomiting. Genitourinary: Negative for dysuria and urgency. Musculoskeletal: Negative for arthralgias and back pain. Neurological: Negative for dizziness and headaches. Psychiatric/Behavioral: Positive for sleep disturbance. Negative for dysphoric mood. The patient is not nervous/anxious.       Past Medical History:   Diagnosis Date    Anxiety     Atrial fibrillation ([address]     Cardiac murmur     Hypercholesterolemia     no medication recommended - just dietary recommendations    Hypertension     130's/80-90's at home, white coat hypertension, stress test 2007 prior to TKA at [facility] [address] Microscopic hematuria     Obesity (BMI 30-39. 9)     Snoring     no apnea, no waking coughing or choking, no am headache, BMI 36, neck circ.  15 inches    Type II or unspecified type diabetes mellitus without mention of complication, not stated as uncontrolled     FSBS        Past Surgical History:   Procedure Laterality Date    JOINT REPLACEMENT Left 2013    left hip replacement--Dr Ellison    KNEE ARTHROPLASTY Bilateral     left 2006 right 2007    ORIF DISTAL RADIUS FRACTURE Right 7/30/2019    ORIF RIGHT DISTAL RADIUS performed by Mireya Joshi MD at [address] [place] [address] History   Problem Relation Age of Onset    Heart Disease Mother 79        CABG, [place]    Stroke Mother     Heart Disease Father     Cancer Father         lung    Arthritis Father     Cancer Sister         brain tumor    Arthritis Brother     Arthritis Sister      Social History     Tobacco Use    Smoking status: Never Smoker    Smokeless tobacco: Never Used   Substance Use Topics    Alcohol use: Never     Frequency: Never      Current Outpatient Medications   Medication Sig Dispense Refill    ramipril (ALTACE) 10 MG capsule Take 20 mg by mouth daily       amiodarone (CORDARONE) 200 MG tablet Take 1 tablet by mouth daily      metFORMIN (GLUCOPHAGE) 1000 MG tablet Take 1 tablet by mouth 2 times daily 180 tablet 3    labetalol (NORMODYNE) 100 MG tablet Take 100 mg by mouth 2 times daily      furosemide (LASIX) 40 MG tablet Take 40 mg by mouth See Admin Instructions Qd on M,W,F      magnesium oxide (MAG-OX) 400 MG tablet Take 400 mg by mouth daily      potassium chloride (MICRO-K) 10 MEQ extended release capsule Take 10 mEq by mouth See Admin Instructions Qd on M,W,F      isosorbide mononitrate (IMDUR) 30 MG extended release tablet Take 30 mg by mouth daily      amLODIPine (NORVASC) 5 MG tablet Take 1 tablet by mouth daily      LANTUS SOLOSTAR 100 UNIT/ML injection pen Inject 30 Units into the skin daily 15 pen 3    zolpidem (AMBIEN) 5 MG tablet Take 1 tablet by mouth nightly as needed for Sleep for up to 30 days. 30 tablet 1    apixaban (ELIQUIS) 5 MG TABS tablet Take 1 tablet by mouth 2 times daily 60 tablet 5    atorvastatin (LIPITOR) 40 MG tablet Take 1 tablet by mouth once daily 90 tablet 3    metoprolol tartrate (LOPRESSOR) 25 MG tablet Take 3 tablets by mouth 2 times daily 180 tablet 5    DULoxetine (CYMBALTA) 30 MG extended release capsule Take 1 capsule by mouth daily 90 capsule 3    Insulin Pen Needle 31G X 8 MM MISC 1 each by Does not apply route daily 100 each 3    Omega-3 Fatty Acids (FISH OIL) 1000 MG CAPS Take 3,000 mg by mouth 2 times daily      Multiple Vitamins-Minerals (OCUVITE PRESERVISION PO) Take by mouth daily      Multiple Vitamins-Minerals (THERAPEUTIC MULTIVITAMIN-MINERALS) tablet Take 1 tablet by mouth daily. No current facility-administered medications for this visit. Allergies   Allergen Reactions    Sulfa Antibiotics Hives     Health Maintenance   Topic Date Due    Hepatitis C screen  Never done    DTaP/Tdap/Td vaccine (1 - Tdap) Never done    Shingles Vaccine (1 of 2) Never done    DEXA (modify frequency per FRAX score)  Never done    Annual Wellness Visit (AWV)  Never done    Pneumococcal 65+ years Vaccine (2 of 2 - PPSV23) 09/25/2019    Flu vaccine (1) 09/01/2020    Lipid screen  12/06/2020    TSH testing  12/06/2020    COVID-19 Vaccine (2 - Moderna 2-dose series) 03/30/2021    Potassium monitoring  05/26/2021    Creatinine monitoring  05/26/2021    Hepatitis A vaccine  Aged Out    Hib vaccine  Aged Out    Meningococcal (ACWY) vaccine  Aged Out         Objective:     Physical Exam  Constitutional:       General: She is not in acute distress. Appearance: She is well-developed. She is not diaphoretic. HENT:      Head: Normocephalic and atraumatic. Eyes:      General: No scleral icterus. Conjunctiva/sclera: Conjunctivae normal.   Neck:      Thyroid: No thyromegaly. Vascular: No JVD. Comments: No bruits  Cardiovascular:      Rate and Rhythm: Normal rate and regular rhythm. Heart sounds: Normal heart sounds. Pulmonary:      Effort: Pulmonary effort is normal. No respiratory distress. Breath sounds: Normal breath sounds. No wheezing or rales. Abdominal:      Palpations: Abdomen is soft. There is no mass. Tenderness: There is no abdominal tenderness. There is no guarding. Musculoskeletal:         General: No tenderness. Skin:     General: Skin is warm and dry. Findings: No rash. Neurological:      Mental Status: She is alert and oriented to person, place, and time. Cranial Nerves: No cranial nerve deficit. Comments: Monofilament testing normal       /78   Pulse 68   Resp 16   Ht 5' 4\" (1.626 m)   Wt 197 lb (89.4 kg)   BMI 33.81 kg/m²       Impression/Plan:  1. Type 2 diabetes mellitus without complication, with long-term current use of insulin ([facility] [address]    2. Essential hypertension    3. Insomnia, unspecified type    4. Atrial fibrillation, unspecified type (HCC)      Requested Prescriptions     Signed Prescriptions Disp Refills    metFORMIN (GLUCOPHAGE) 1000 MG tablet 180 tablet 3     Sig: Take 1 tablet by mouth 2 times daily    LANTUS SOLOSTAR 100 UNIT/ML injection pen 15 pen 3     Sig: Inject 30 Units into the skin daily    zolpidem (AMBIEN) 5 MG tablet 30 tablet 1     Sig: Take 1 tablet by mouth nightly as needed for Sleep for up to 30 days.      Orders Placed This Encounter   Procedures    Lipid Panel     Standing Status:   Future     Standing Expiration Date:   3/11/2022     Order Specific Question:   Is Patient Fasting?/# of Hours     Answer:   yes/12    Comprehensive Metabolic Panel     Standing Status:   Future     Standing Expiration Date:   3/11/2022    POCT Glucose    POCT glycosylated hemoglobin (Hb A1C)     Discuss changes to medications, need for medication compliance. Reviewed sleep hygiene with the patient. Recommended as needed use of Ambien  Patient giveneducational materials - see patient instructions. Discussed use, benefit, and side effects of prescribed medications. All patient questions answered. Pt voiced understanding. Reviewed health maintenance. Patient agreedwith treatment plan. Follow up as directed. **This report has been created using voice recognition software. It may contain minor errorswhich are inherent in voice recognition technology. **       Electronically signed by Jerman Gilbert MD on 3/13/2021 at 7:24 AM

## 2021-03-26 LAB
A/G RATIO: 1.3 (ref 1.5–2.5)
ALBUMIN SERPL-MCNC: 3.9 GM/DL (ref 3.5–5)
ALP BLD-CCNC: 70 IU/L (ref 39–118)
ALT SERPL-CCNC: 12 IU/L (ref 10–40)
ANION GAP SERPL CALCULATED.3IONS-SCNC: 8 MMOL/L (ref 4–12)
AST SERPL-CCNC: 13 IU/L (ref 15–41)
BILIRUB SERPL-MCNC: 0.4 MG/DL (ref 0.2–1)
BUN BLDV-MCNC: 20 MG/DL (ref 7–20)
CALCIUM SERPL-MCNC: 9.1 MG/DL (ref 8.8–10.5)
CHLORIDE BLD-SCNC: 102 MEQ/L (ref 101–111)
CHOLESTEROL/HDL RELATIVE RISK: 4.1 (ref 4–4.4)
CHOLESTEROL: 226 MG/DL
CO2: 29 MEQ/L (ref 21–32)
CREAT SERPL-MCNC: 0.88 MG/DL (ref 0.6–1.3)
CREATININE CLEARANCE: >60
DIRECT-LDL / HDL RISK: 2.9
GLUCOSE: 181 MG/DL (ref 70–110)
HDLC SERPL-MCNC: 55 MG/DL
LDL CHOLESTEROL DIRECT: 160 MG/DL
POTASSIUM SERPL-SCNC: 4.3 MEQ/L (ref 3.6–5)
SODIUM BLD-SCNC: 139 MEQ/L (ref 135–145)
TOTAL PROTEIN: 6.8 G/DL (ref 6.2–8)
TRIGL SERPL-MCNC: 93 MG/DL
VLDLC SERPL CALC-MCNC: 18 MG/DL

## 2021-03-29 ENCOUNTER — TELEPHONE (OUTPATIENT)
Dept: FAMILY MEDICINE CLINIC | Age: 81
End: 2021-03-29

## 2021-03-29 DIAGNOSIS — I10 ESSENTIAL HYPERTENSION: ICD-10-CM

## 2021-03-29 DIAGNOSIS — E78.5 HYPERLIPIDEMIA, UNSPECIFIED HYPERLIPIDEMIA TYPE: ICD-10-CM

## 2021-03-29 DIAGNOSIS — Z79.4 TYPE 2 DIABETES MELLITUS WITHOUT COMPLICATION, WITH LONG-TERM CURRENT USE OF INSULIN (HCC): Primary | ICD-10-CM

## 2021-03-29 DIAGNOSIS — E11.9 TYPE 2 DIABETES MELLITUS WITHOUT COMPLICATION, WITH LONG-TERM CURRENT USE OF INSULIN (HCC): Primary | ICD-10-CM

## 2021-03-29 RX ORDER — ATORVASTATIN CALCIUM 40 MG/1
40 TABLET, FILM COATED ORAL DAILY
Qty: 90 TABLET | Refills: 1 | Status: SHIPPED | OUTPATIENT
Start: 2021-03-29

## 2021-03-29 NOTE — TELEPHONE ENCOUNTER
She has not been taking it at all, so this is sent to [facility] and labs mailed to home to repeat in 3 months.

## 2021-03-29 NOTE — TELEPHONE ENCOUNTER
----- Message from Roselia Harris MD sent at 3/29/2021  7:13 AM EDT -----  Verify patient is taking her atorvastatin 40 mg daily, if not increased to 80 mg daily, 90, 3 refills.   Hemoglobin A1c, CMP lipid panel in 3 months

## 2021-04-15 ENCOUNTER — OFFICE VISIT (OUTPATIENT)
Dept: FAMILY MEDICINE CLINIC | Age: 81
End: 2021-04-15

## 2021-04-15 VITALS
DIASTOLIC BLOOD PRESSURE: 84 MMHG | WEIGHT: 193 LBS | BODY MASS INDEX: 32.95 KG/M2 | HEIGHT: 64 IN | RESPIRATION RATE: 16 BRPM | TEMPERATURE: 98.5 F | HEART RATE: 83 BPM | SYSTOLIC BLOOD PRESSURE: 124 MMHG

## 2021-04-15 DIAGNOSIS — M54.42 ACUTE BILATERAL LOW BACK PAIN WITH BILATERAL SCIATICA: ICD-10-CM

## 2021-04-15 DIAGNOSIS — S33.6XXA SACROILIAC (LIGAMENT) SPRAIN, INITIAL ENCOUNTER: ICD-10-CM

## 2021-04-15 DIAGNOSIS — Z00.00 ROUTINE GENERAL MEDICAL EXAMINATION AT A HEALTH CARE FACILITY: Primary | ICD-10-CM

## 2021-04-15 DIAGNOSIS — M54.41 ACUTE BILATERAL LOW BACK PAIN WITH BILATERAL SCIATICA: ICD-10-CM

## 2021-04-15 PROCEDURE — G0438 PPPS, INITIAL VISIT: HCPCS | Performed by: NURSE PRACTITIONER

## 2021-04-15 RX ORDER — CYCLOBENZAPRINE HCL 10 MG
TABLET ORAL
COMMUNITY
Start: 2021-04-08 | End: 2021-08-18 | Stop reason: ALTCHOICE

## 2021-04-15 RX ORDER — BACLOFEN 10 MG/1
10 TABLET ORAL 3 TIMES DAILY
Qty: 21 TABLET | Refills: 0 | Status: SHIPPED | OUTPATIENT
Start: 2021-04-15 | End: 2021-04-22

## 2021-04-15 RX ORDER — HYDROCODONE BITARTRATE AND ACETAMINOPHEN 5; 325 MG/1; MG/1
1 TABLET ORAL EVERY 8 HOURS PRN
Qty: 21 TABLET | Refills: 0 | Status: SHIPPED | OUTPATIENT
Start: 2021-04-15 | End: 2021-04-22

## 2021-04-15 SDOH — ECONOMIC STABILITY: FOOD INSECURITY: WITHIN THE PAST 12 MONTHS, YOU WORRIED THAT YOUR FOOD WOULD RUN OUT BEFORE YOU GOT MONEY TO BUY MORE.: NEVER TRUE

## 2021-04-15 SDOH — ECONOMIC STABILITY: TRANSPORTATION INSECURITY
IN THE PAST 12 MONTHS, HAS THE LACK OF TRANSPORTATION KEPT YOU FROM MEDICAL APPOINTMENTS OR FROM GETTING MEDICATIONS?: NO

## 2021-04-15 ASSESSMENT — PATIENT HEALTH QUESTIONNAIRE - PHQ9
1. LITTLE INTEREST OR PLEASURE IN DOING THINGS: 0
SUM OF ALL RESPONSES TO PHQ QUESTIONS 1-9: 0
SUM OF ALL RESPONSES TO PHQ QUESTIONS 1-9: 0

## 2021-04-15 ASSESSMENT — LIFESTYLE VARIABLES: HOW OFTEN DO YOU HAVE A DRINK CONTAINING ALCOHOL: 0

## 2021-04-15 NOTE — PROGRESS NOTES
Medicare Annual Wellness Visit  Name: Francisco Rodriguez Date: 4/15/2021   MRN: [id number] Sex: Female   Age: 79 y.o. Ethnicity: Non-/Non    : 1940 Race: April Tran is here for Medicare AWV (twisted while getting out of the car low back and hip pain. Went to [facility] ER  and xray was clear. Went back to the [facility] ER 1 week ago again, then tried chiropractor. )    Screenings for behavioral, psychosocial and functional/safety risks, and cognitive dysfunction are all negative except as indicated below. These results, as well as other patient data from the [address] form, are documented in Flowsheets linked to this Encounter. Allergies   Allergen Reactions    Trimethoprim Hives    Sulfa Antibiotics Hives         Prior to Visit Medications    Medication Sig Taking? Authorizing Provider   cyclobenzaprine (FLEXERIL) 10 MG tablet cyclobenzaprine Cyclobenzaprine Active 10 MG PO Twice Daily as needed  8:40pm 2021  Amberly Barber ([id number]) Yes Historical Provider, MD   HYDROcodone-acetaminophen (NORCO) 5-325 MG per tablet Take 1 tablet by mouth every 8 hours as needed for Pain for up to 7 days. Intended supply: 3 days.  Take lowest dose possible to manage pain Yes BRENDAN Fisher CNP   baclofen (LIORESAL) 10 MG tablet Take 1 tablet by mouth 3 times daily for 7 days Yes BRENDAN Fisher CNP   atorvastatin (LIPITOR) 40 MG tablet Take 1 tablet by mouth daily Yes Lynda Bryant MD   ramipril (ALTACE) 10 MG capsule Take 20 mg by mouth daily  Yes Historical Provider, MD   amiodarone (CORDARONE) 200 MG tablet Take 1 tablet by mouth daily Yes Historical Provider, MD   metFORMIN (GLUCOPHAGE) 1000 MG tablet Take 1 tablet by mouth 2 times daily Yes Lynda Bryant MD   labetalol (NORMODYNE) 100 MG tablet Take 100 mg by mouth 2 times daily Yes Historical Provider, MD   furosemide (LASIX) 40 MG tablet Take 40 mg by mouth See Admin replacement--Dr Herr Mineral    KNEE ARTHROPLASTY Bilateral     left 2006 right 2007    ORIF DISTAL RADIUS FRACTURE Right 7/30/2019    ORIF RIGHT DISTAL RADIUS performed by Ileana Hurtado MD at [facility] History   Problem Relation Age of Onset    Heart Disease Mother 79        [place]AB[place], [place]    Stroke Mother     Heart Disease Father     Cancer Father         lung    Arthritis Father     Cancer Sister         brain tumor    Arthritis Brother     Arthritis Sister        CareTeam (Including outside providers/suppliers regularly involved in providing care):   Patient Care Team:  Destini Gomez MD as PCP - General (Family Medicine)  Destini Gomez MD as PCP - [facility] Empaneled Provider    Wt Readings from Last 3 Encounters:   04/15/21 193 lb (87.5 kg)   03/11/21 197 lb (89.4 kg)   02/25/20 205 lb (93 kg)     Vitals:    04/15/21 1452   BP: 124/84   Pulse: 83   Resp: 16   Temp: 98.5 °F (36.9 °C)   TempSrc: Oral   Weight: 193 lb (87.5 kg)   Height: 5' 3.5\" (1.613 m)     Body mass index is 33.65 kg/m². Based upon direct observation of the patient, evaluation of cognition reveals recent memory intact, remote memory impaired. General Appearance: alert and oriented to person, place and time, well developed and well- nourished, in moderate distress from her low back pain, moving about uncomfortably in her chair and grimacing.   Skin: warm and dry, no rash or erythema  Head: normocephalic and atraumatic  Eyes: pupils equal, round, and reactive to light, extraocular eye movements intact, conjunctivae normal  ENT: tympanic membrane, external ear and ear canal normal bilaterally, nose without deformity, nasal mucosa and turbinates normal without polyps  Neck: supple and non-tender without mass, no thyromegaly or thyroid nodules, no cervical lymphadenopathy  Pulmonary/Chest: clear to auscultation bilaterally- no wheezes, rales or rhonchi, normal air movement, no respiratory distress  Cardiovascular: normal rate, regular rhythm, normal S1 and S2, no murmurs, rubs, clicks, or gallops, distal pulses intact, no carotid bruits  Abdomen: soft, non-tender, non-distended, normal bowel sounds, no masses or organomegaly  Extremities: no cyanosis, clubbing or edema  Musculoskeletal: normal range of motion, bilateral lumbar paraspinous muscle tenderness down into the posterior pelvic region. Neurologic: reflexes normal and symmetric, no cranial nerve deficit, gait, coordination and speech normal.  Patient seems to occasionally forget a fact that her daughter who is with her corrects. Patient's complete Health Risk Assessment and screening values have been reviewed and are found in Flowsheets. The following problems were reviewed today and where indicated follow up appointments were made and/or referrals ordered. Positive Risk Factor Screenings with Interventions:            General Health and ACP:  General  In general, how would you say your health is?: Good  In the past 7 days, have you experienced any of the following?  New or Increased Pain, New or Increased Fatigue, Loneliness, Social Isolation, Stress or Anger?: (!) New or Increased Pain  Do you get the social and emotional support that you need?: Yes  Do you have a Living Will?: (!) No  Advance Directives     Power of [address] [place] [address] Will ACP-Advance Directive ACP-Power of     Not on File Not on File Not on File Not on File      General Health Risk Interventions:  · Pain will be managed by our office    Health Habits/Nutrition:  Health Habits/Nutrition  Do you exercise for at least 20 minutes 2-3 times per week?: (!) No  Have you lost any weight without trying in the past 3 months?: No  Do you eat only one meal per day?: No  Have you seen the dentist within the past year?: Yes  Body mass index: (!) 33.65  Health Habits/Nutrition Interventions:  · Limited mobility     Safety:  Safety  Do you have working smoke detectors?: Yes  Have all throw rugs been removed or fastened?: (!) No  Do you have non-slip mats or surfaces in all bathtubs/showers?: (!) No  Do all of your stairways have a railing or banister?: Yes  Are your doorways, halls and stairs free of clutter?: Yes  Do you always fasten your seatbelt when you are in a car?: Yes  Safety Interventions:  · Patient declines any further evaluation/treatment for this issue    ADL:  ADLs  In the past 7 days, did you need help from others to perform any of the following everyday activities? Eating, dressing, grooming, bathing, toileting, or walking/balance?: (!) Walking/Balance  In the past 7 days, did you need help from others to take care of any of the following? Laundry, housekeeping, banking/finances, shopping, telephone use, food preparation, transportation, or taking medications?: [place], Food Preparation, Transportation, Taking Medications  ADL Interventions:  · Patient declines any further evaluation/treatment for this issue   Patient's daughter is currently living with her and providing for all of these needs.   Personalized Preventive Plan   Current Health Maintenance Status  Immunization History   Administered Date(s) Administered    Influenza, High Dose (Fluzone 65 yrs and older) 09/25/2018    Influenza, Triv, inactivated, subunit, adjuvanted, IM (Fluad 65 yrs and older) 12/03/2019    Pneumococcal Conjugate 13-valent ([employer]) 09/25/2018        Health Maintenance   Topic Date Due    Hepatitis C screen  Never done    DTaP/Tdap/Td vaccine (1 - Tdap) Never done    Shingles Vaccine (1 of 2) Never done    DEXA (modify frequency per FRAX score)  Never done   [employer] Visit (AWV)  Never done    Pneumococcal 65+ years Vaccine (2 of 2 - PPSV23) 09/25/2019    TSH testing  12/06/2020    Flu vaccine (Season Ended) 09/01/2021    Lipid screen  03/26/2022    Potassium monitoring  03/26/2022    Creatinine monitoring  03/26/2022    COVID-19 Vaccine  Completed    Hepatitis A vaccine  Aged Out    Hib vaccine  Aged Out    Meningococcal (ACWY) vaccine  Aged Out     Recommendations for [employer] Due: see orders and patient instructions/AVS.  . Recommended screening schedule for the next 5-10 years is provided to the patient in written form: see Patient Instructions/AVS.    Cesar Matthew was seen today for medicare awv. Diagnoses and all orders for this visit:    Routine general medical examination at a health care facility    Acute bilateral low back pain with bilateral sciatica  -     MRI LUMBAR SPINE [address]; Future  -     HYDROcodone-acetaminophen (NORCO) 5-325 MG per tablet; Take 1 tablet by mouth every 8 hours as needed for Pain for up to 7 days. Intended supply: 3 days. Take lowest dose possible to manage pain  -     baclofen (LIORESAL) 10 MG tablet; Take 1 tablet by mouth 3 times daily for 7 days    Sacroiliac (ligament) sprain, initial encounter  -     MRI PELVIS WO CONTRAST; Future  -     HYDROcodone-acetaminophen (NORCO) 5-325 MG per tablet; Take 1 tablet by mouth every 8 hours as needed for Pain for up to 7 days. Intended supply: 3 days. Take lowest dose possible to manage pain  -     baclofen (LIORESAL) 10 MG tablet; Take 1 tablet by mouth 3 times daily for 7 days    In the last 12 days the patient has been in the emergency department for evaluation due to her moderate to severe pain, ever since she twisted her low back while trying to go into a restaurant. Patient has been on Flexeril intermittently since then which is provided some relief but is also made her extremely sleepy. Patient has tried ibuprofen and Tylenol both with minimal effectiveness. Patient has visited her chiropractor 4 times who stated that she needs more definitive care that he can provide. Am unable to place the patient on steroids due to her diabetic status. Patient is extremely uncomfortable today and unable to sit still.   Her x-rays were reviewed from the emergency department that show facet arthropathy and

## 2021-04-16 ENCOUNTER — TELEPHONE (OUTPATIENT)
Dept: FAMILY MEDICINE CLINIC | Age: 81
End: 2021-04-16

## 2021-04-16 DIAGNOSIS — M54.41 ACUTE BILATERAL LOW BACK PAIN WITH BILATERAL SCIATICA: Primary | ICD-10-CM

## 2021-04-16 DIAGNOSIS — S33.6XXA SACROILIAC (LIGAMENT) SPRAIN, INITIAL ENCOUNTER: ICD-10-CM

## 2021-04-16 DIAGNOSIS — M54.42 ACUTE BILATERAL LOW BACK PAIN WITH BILATERAL SCIATICA: Primary | ICD-10-CM

## 2021-04-16 NOTE — TELEPHONE ENCOUNTER
Pt had ORIF right distal radius fx in 2019.  You ordered MRI's of Lumbar Spine and Pelvis without contrast. Change to Ct's?

## 2021-04-22 ENCOUNTER — OUTSIDE SERVICES (OUTPATIENT)
Dept: FAMILY MEDICINE CLINIC | Age: 81
End: 2021-04-22
Payer: MEDICARE

## 2021-04-22 DIAGNOSIS — Z79.4 TYPE 2 DIABETES MELLITUS WITHOUT COMPLICATION, WITH LONG-TERM CURRENT USE OF INSULIN (HCC): ICD-10-CM

## 2021-04-22 DIAGNOSIS — E11.9 TYPE 2 DIABETES MELLITUS WITHOUT COMPLICATION, WITH LONG-TERM CURRENT USE OF INSULIN (HCC): ICD-10-CM

## 2021-04-22 DIAGNOSIS — M54.41 ACUTE BILATERAL LOW BACK PAIN WITH BILATERAL SCIATICA: Primary | ICD-10-CM

## 2021-04-22 DIAGNOSIS — I10 ESSENTIAL HYPERTENSION: ICD-10-CM

## 2021-04-22 DIAGNOSIS — M54.42 ACUTE BILATERAL LOW BACK PAIN WITH BILATERAL SCIATICA: Primary | ICD-10-CM

## 2021-04-22 PROCEDURE — 99304 1ST NF CARE SF/LOW MDM 25: CPT | Performed by: FAMILY MEDICINE

## 2021-04-22 NOTE — PROGRESS NOTES
4/22/2021  Demi Polanco  1940  Subjective: Patient is an [de-identified] y.o. female. She is    She is a Non-smoker. She is a new patient here at [address] home. She comes to us from [facility] after 3 weeks of low back pain radiating into the legs, L>R. Conservative treatment, no surgery. Here for therapy and return to home. She denies Headaches, chest pain, SOB, or abdominal pain. She does have a Past Medical History of   Past Medical History:   Diagnosis Date    Anxiety     Atrial fibrillation ([address]     Cardiac murmur     Hypercholesterolemia     no medication recommended - just dietary recommendations    Hypertension     130's/80-90's at home, white coat hypertension, stress test 2007 prior to TKA at [facility] Microscopic hematuria     Obesity (BMI 30-39. 9)     Snoring     no apnea, no waking coughing or choking, no am headache, BMI 36, neck circ. 15 inches    Type II or unspecified type diabetes mellitus without mention of complication, not stated as uncontrolled     FSBS     . She has a past surgical history of   Past Surgical History:   Procedure Laterality Date    JOINT REPLACEMENT Left 2013    left hip replacement--Dr Ellison    KNEE ARTHROPLASTY Bilateral     left 2006 right 2007    ORIF DISTAL RADIUS FRACTURE Right 7/30/2019    ORIF RIGHT DISTAL RADIUS performed by Ryan Perrin MD at [facility]   . She has the following allergies: Trimethoprim and Sulfa antibiotics. Objective: Please see vitals per the patient's chart. She is well developed and well nourished and in no acute distress. The head is normocephalic/atraumatic. The pupils are round, equal, reactive to light and accommodation and extraocular muscles are intact. There is no sinus tenderness to palpation. The ears were clear. Nares were normal.  The throat is clear with no cervical lymphadenopathy and no carotid bruits were appreciated. The lungs were clear.   Heart rate and rhythm regular with 3/6 murmur. The abdomen is soft and nontender. Extremities are without edema. Low back pain noted. Neurologically alert and oriented. The cranial nerves are grossly intact with no sensory motor deficits. The skin reveals no rashes or lesions. Assessment:    Diagnosis Orders   1. Acute bilateral low back pain with bilateral sciatica     2. Type 2 diabetes mellitus without complication, with long-term current use of insulin ([facility] [address]     3. Essential hypertension       Plan: We will continue current medication regimen including norco for pain, lidocaine patch for pain and robaxin for spasms and supportive care and recheck in 1 month. Intensive therapies, monitor progress.       Electronically signed by Molly Boo MD on 4/22/2021 at 10:19 AM

## 2021-05-04 ENCOUNTER — OUTSIDE SERVICES (OUTPATIENT)
Dept: FAMILY MEDICINE CLINIC | Age: 81
End: 2021-05-04
Payer: MEDICARE

## 2021-05-04 DIAGNOSIS — Z79.4 TYPE 2 DIABETES MELLITUS WITHOUT COMPLICATION, WITH LONG-TERM CURRENT USE OF INSULIN (HCC): ICD-10-CM

## 2021-05-04 DIAGNOSIS — M62.838 MUSCLE SPASM: ICD-10-CM

## 2021-05-04 DIAGNOSIS — M54.41 ACUTE BILATERAL LOW BACK PAIN WITH BILATERAL SCIATICA: Primary | ICD-10-CM

## 2021-05-04 DIAGNOSIS — E11.9 TYPE 2 DIABETES MELLITUS WITHOUT COMPLICATION, WITH LONG-TERM CURRENT USE OF INSULIN (HCC): ICD-10-CM

## 2021-05-04 DIAGNOSIS — M54.42 ACUTE BILATERAL LOW BACK PAIN WITH BILATERAL SCIATICA: Primary | ICD-10-CM

## 2021-05-04 PROCEDURE — 99308 SBSQ NF CARE LOW MDM 20: CPT | Performed by: FAMILY MEDICINE

## 2021-05-04 NOTE — PROGRESS NOTES
5/4/2021  Kalilupillo Smithter  1940  Subjective:  She was in his/her room to follow up back pain. States she is still in a lot of pain. Ct scan showed moderate to severe spinal stenosis. Is on 3 norco daily and baclofen. Doing therapy. Denies headaches, sob, cp or abdominal pains. Objective:   Please see vitals per chart. There is no sinus tenderness to palpation, no cervical lymphadenopathy. Lungs are clear. Heart Regular rate and rhythm without murmur. The abdomen is soft and nontender. Extremities are without edema. Has low back pain. Assessment:   Diagnosis Orders   1. Acute bilateral low back pain with bilateral sciatica  Unstable, increase norco to 2 upon rising, 2 at noon, 1 at supper and 1 at bedtime. May need back specialist consult. 2. Muscle spasm  Unstable, d/c baclofen, trial of skelaxin 800 mg TID for spasms. 3. Type 2 diabetes mellitus without complication, with long-term current use of insulin (HCC)  Stable,   Lab Results   Component Value Date    LABA1C 8.6 03/11/2021     Lab Results   Component Value Date     12/06/2019          Plan: We will continue current medication regimen including norco for pain, skelaxin and robaxin for spasms and supportive care and recheck in 1 month. Continue therapies.     Electronically signed by Shelton Groves MD on 5/4/2021 at 12:41 PM.

## 2021-05-05 ENCOUNTER — TELEPHONE (OUTPATIENT)
Dept: FAMILY MEDICINE CLINIC | Age: 81
End: 2021-05-05

## 2021-05-05 DIAGNOSIS — R26.81 GAIT INSTABILITY: ICD-10-CM

## 2021-05-05 DIAGNOSIS — M54.42 ACUTE BILATERAL LOW BACK PAIN WITH BILATERAL SCIATICA: Primary | ICD-10-CM

## 2021-05-05 DIAGNOSIS — R53.1 WEAKNESS: ICD-10-CM

## 2021-05-05 DIAGNOSIS — M54.41 ACUTE BILATERAL LOW BACK PAIN WITH BILATERAL SCIATICA: Primary | ICD-10-CM

## 2021-05-05 NOTE — TELEPHONE ENCOUNTER
Needs walker with seat, being discharged from [facility] and they can get it covered if they have a script. LOV 4-15-21    Fax DME order for walker with seat to [id number].

## 2021-05-07 ENCOUNTER — TELEPHONE (OUTPATIENT)
Dept: FAMILY MEDICINE CLINIC | Age: 81
End: 2021-05-07

## 2021-05-07 NOTE — TELEPHONE ENCOUNTER
----- Message from Nicholas July sent at 5/7/2021  1:08 PM EDT -----  Subject: Hospital Follow Up    QUESTIONS  What hospital was the Patient Discharged from? [facility] in   [place], [place]  Date of Discharge? 2021-05-07  Discharge Location? Home  Reason for hospitalization as patient stated? Spondylolysis of the lumbar   region and low back pain  What question does the patient have, if applicable? Needs to schedule a   routine follow up after discharge.  ---------------------------------------------------------------------------  --------------  CALL BACK INFO  What is the best way for the office to contact you? OK to leave message on   voicemail  Preferred Call Back Phone Number? [phone]  ---------------------------------------------------------------------------  --------------  SCRIPT ANSWERS  Relationship to Patient? Other  Representative Name? Yissel  Additional information verified (besides Name and Date of Birth)? Phone   Number  Appointment reason? Well Care/Follow Ups  Select a Well Care/Follow Ups appointment reason? Adult Hospital Follow Up   [Inpatient Discharge, [address]  (Patient requests to see provider urgently. )? No  (Has the patient been discharged from the hospital within 2 business days   AND does not have a Telephone Encounter  Follow Up From [address] [place] [address]   documented in [address]?)?  Yes

## 2021-05-21 ENCOUNTER — TELEPHONE (OUTPATIENT)
Dept: FAMILY MEDICINE CLINIC | Age: 81
End: 2021-05-21

## 2021-05-21 DIAGNOSIS — R82.90 BAD ODOR OF URINE: Primary | ICD-10-CM

## 2021-05-21 NOTE — TELEPHONE ENCOUNTER
She is hallucinating, strong odor to urine. Daughter cannot get her out of bed to get her in to urgent care or ER. Home Health can collect a urine if ok?

## 2021-05-21 NOTE — TELEPHONE ENCOUNTER
----- Message from Rhina  sent at 5/21/2021 12:14 PM EDT -----  Subject: Message to Provider    QUESTIONS  Information for Provider? Pt daughter is wanting to get a UA ordered and   faxed [phone] to [facility] [place]   so they can get that order taken care of daughter thinks she has a UTI.   ---------------------------------------------------------------------------  --------------  CALL BACK INFO  What is the best way for the office to contact you? OK to leave message on   voicemail  Preferred Call Back Phone Number? [phone]  ---------------------------------------------------------------------------  --------------  SCRIPT ANSWERS  Relationship to Patient? Other  Representative Name? Lauren Castillo  Is the Representative on the appropriate HIPAA document in Epic?  Yes

## 2021-06-16 DIAGNOSIS — G89.4 CHRONIC PAIN SYNDROME: Primary | ICD-10-CM

## 2021-06-16 RX ORDER — HYDROCODONE BITARTRATE AND ACETAMINOPHEN 7.5; 325 MG/1; MG/1
1 TABLET ORAL EVERY 6 HOURS PRN
Qty: 56 TABLET | Refills: 0 | Status: SHIPPED | OUTPATIENT
Start: 2021-06-16 | End: 2021-06-30

## 2021-06-16 RX ORDER — HYDROCODONE BITARTRATE AND ACETAMINOPHEN 5; 325 MG/1; MG/1
1 TABLET ORAL ONCE
Qty: 1 TABLET | Refills: 0 | Status: SHIPPED | OUTPATIENT
Start: 2021-06-16 | End: 2021-06-16

## 2021-06-17 VITALS
TEMPERATURE: 98 F | SYSTOLIC BLOOD PRESSURE: 143 MMHG | WEIGHT: 178 LBS | BODY MASS INDEX: 31.04 KG/M2 | DIASTOLIC BLOOD PRESSURE: 80 MMHG | HEART RATE: 75 BPM | RESPIRATION RATE: 16 BRPM

## 2021-06-17 PROBLEM — I48.11 LONGSTANDING PERSISTENT ATRIAL FIBRILLATION (HCC): Status: ACTIVE | Noted: 2021-06-17

## 2021-06-17 PROBLEM — M46.26 OSTEOMYELITIS OF LUMBAR SPINE (HCC): Status: ACTIVE | Noted: 2021-06-17

## 2021-06-17 PROBLEM — M46.46 DISCITIS OF LUMBAR REGION: Status: ACTIVE | Noted: 2021-06-17

## 2021-06-17 PROBLEM — M48.062 SPINAL STENOSIS OF LUMBAR REGION WITH NEUROGENIC CLAUDICATION: Status: ACTIVE | Noted: 2021-06-17

## 2021-06-17 NOTE — PROGRESS NOTES
Giovanny Sutton is a [de-identified] y.o. female that is being seen at [facility] for Other (Osteomyelitis)      Giovanny Sutton  1940 6/18/21      HPI:  Patient seen and examined at bedside. History obtained from patient and chart. Patient was recently admitted to [facility] for treatment of osteomyelitis. Per DC summary:    Pt is a 81-year-old female who was admitted due to L2 Compression Fracture,UTI,also found  to have Discitis, pt presented to the emergency room complaining of intractable lower back  pain nonradiating with associated generalized weakness. CT lumbar spine showed  degenerative changes with age-indeterminate compression deformity of L2. Started on  antibiotics, Tylenol prn, Tramadol prn, Lidoderm patch, Robaxin. Patient had MRI Lumbar  spine done 5/24  showed: Interval development of discitis at L2/L3 with osteomyelitis of  L2 and the superior aspect of L3 with surrounding paraspinous phlegmon and early abscess  formation in the psoas muscles particularly on the left but no epidural abscess. There is  also fluid in the L1/L2 disc which may represent early discitis. Ortho on board, deferred  care to Dr. Gay Rea. Patient had Bilateral L2-3 laminectomies, and L2-3 discectomy. ID on  consult started on IV antibiotics, repeat cultures negative transitioned to po. Pt deemed  stable for discharge. In speaking with patient today, she reports that she is still having quite a bit of low back pain that is radiating to the legs. States that any movement worsens the pain. She is having difficulty eating due to the pain. No paresthesias. Pain is 10/10 at its worst.  If she lays completely still, it will go to a 6/10. Yris Simon does not seem to be helping. We discussed changing the Norco to Percocet and changing flexeril to TID and she would like to do this. She denies constipation.     I have reviewed the patient's past medical history, past surgical history, allergies,medications, social and family history and I have made updates where appropriate. Past Medical History:   Diagnosis Date    Anxiety     Atrial fibrillation [facility])     Cardiac murmur     Hypercholesterolemia     no medication recommended - just dietary recommendations    Hypertension     130's/80-90's at home, white coat hypertension, stress test 2007 prior to TKA at [facility] 112 Microscopic hematuria     Obesity (BMI 30-39. 9)     Snoring     no apnea, no waking coughing or choking, no am headache, BMI 36, neck circ. 15 inches    Type II or unspecified type diabetes mellitus without mention of complication, not stated as uncontrolled     FSBS         Past Surgical History:   Procedure Laterality Date    JOINT REPLACEMENT Left 2013    left hip replacement--Dr Ellison    KNEE ARTHROPLASTY Bilateral     left 2006 right 2007    ORIF DISTAL RADIUS FRACTURE Right 7/30/2019    ORIF RIGHT DISTAL RADIUS performed by Collin Colon [facility] at [facility] History     Tobacco Use    Smoking status: Never Smoker    Smokeless tobacco: Never Used   Vaping Use    Vaping Use: Never used   Substance Use Topics    Alcohol use: Never    Drug use: No       Family History   Problem Relation Age of Onset    Heart Disease Mother 79        CABG, [place]    Stroke Mother     Heart Disease Father     Cancer Father         lung    Arthritis Father     Cancer Sister         brain tumor    Arthritis Brother     Arthritis Sister          Review of Systems   Constitutional: Positive for fatigue. Negative for chills and fever. HENT: Negative for congestion, ear pain, nosebleeds, sore throat and tinnitus. Eyes: Negative for pain, discharge and redness. Respiratory: Negative for cough, shortness of breath and wheezing. Cardiovascular: Negative for chest pain, palpitations and leg swelling. Gastrointestinal: Negative for blood in stool, constipation, diarrhea, nausea and vomiting. Endocrine: Negative for polydipsia.    Genitourinary: Negative for dysuria, frequency, hematuria and urgency. Musculoskeletal: Positive for back pain. Negative for myalgias. Skin: Negative for rash. Allergic/Immunologic: Negative for environmental allergies. Neurological: Negative for dizziness, tremors, seizures, weakness and headaches. Hematological: Does not bruise/bleed easily. Psychiatric/Behavioral: Negative for hallucinations and suicidal ideas. The patient is not nervous/anxious. PHYSICAL EXAM:    BP (!) 143/80   Pulse 75   Temp 98 °F (36.7 °C)   Resp 16   Wt 178 lb (80.7 kg)   BMI 31.04 kg/m²       Physical Exam  Vitals and nursing note reviewed. Constitutional:       General: She is in acute distress (appears very uncomfortable). Appearance: She is well-developed. She is not diaphoretic. HENT:      Head: Normocephalic and atraumatic. Right Ear: Hearing, tympanic membrane, ear canal and external ear normal.      Left Ear: Hearing, tympanic membrane, ear canal and external ear normal.      Nose: Nose normal.      Mouth/Throat:      Dentition: Normal dentition. Pharynx: Uvula midline. Eyes:      General: Lids are normal.         Right eye: No discharge. Left eye: No discharge. Conjunctiva/sclera: Conjunctivae normal.      Pupils: Pupils are equal, round, and reactive to light. Neck:      Thyroid: No thyromegaly. Trachea: No tracheal deviation. Cardiovascular:      Rate and Rhythm: Normal rate and regular rhythm. Heart sounds: Normal heart sounds. No murmur heard. No friction rub. No gallop. Comments: No clubbing, cyanosis or edema of the LEs bilaterally  Pulmonary:      Effort: Pulmonary effort is normal. No respiratory distress. Breath sounds: Normal breath sounds. No wheezing or rales. Abdominal:      General: There is no distension. Palpations: Abdomen is soft. There is no mass. Tenderness: There is no abdominal tenderness. There is no guarding or rebound.       Hernia: No hernia is present. Musculoskeletal:         General: No tenderness. Cervical back: Normal range of motion and neck supple. Lymphadenopathy:      Head:      Right side of head: No tonsillar or preauricular adenopathy. Left side of head: No tonsillar or preauricular adenopathy. Cervical: No cervical adenopathy. Upper Body:      Right upper body: No supraclavicular adenopathy. Left upper body: No supraclavicular adenopathy. Skin:     General: Skin is warm and dry. Findings: No erythema or rash. Neurological:      Mental Status: She is alert. Cranial Nerves: No cranial nerve deficit. Motor: No tremor or abnormal muscle tone. Coordination: Coordination normal.      Gait: Gait normal.   Psychiatric:         Behavior: Behavior normal.         Thought Content: Thought content normal.         Judgment: Judgment normal.         ASSESSMENT & PLAN  Danna Archer was seen today for other.     Diagnoses and all orders for this visit:    Anxiety    Type 2 diabetes mellitus without complication, with long-term current use of insulin (HCC)    Essential hypertension    Osteomyelitis of lumbar spine ([facility])    Spinal stenosis of lumbar region with neurogenic claudication    Discitis of lumbar region    Longstanding persistent atrial fibrillation (HCC)      Discitis/Osteomyelitis:  Cont Percocet, flexeril, ceftriaxone, PT, OT  HTN:  Cont imdur, Lasix, Labetolol  DM2:  Cont metformin, Lantus, Lispro  AFib:  Cont eliquis    All copied or forwarded information in the progress note was verified by me to be accurate at the time of visit  Patient's past medical, surgical, social and family history were reviewed and updated

## 2021-06-18 ENCOUNTER — OUTSIDE SERVICES (OUTPATIENT)
Dept: FAMILY MEDICINE CLINIC | Age: 81
End: 2021-06-18
Payer: MEDICARE

## 2021-06-18 DIAGNOSIS — M48.062 SPINAL STENOSIS OF LUMBAR REGION WITH NEUROGENIC CLAUDICATION: ICD-10-CM

## 2021-06-18 DIAGNOSIS — E11.9 TYPE 2 DIABETES MELLITUS WITHOUT COMPLICATION, WITH LONG-TERM CURRENT USE OF INSULIN (HCC): ICD-10-CM

## 2021-06-18 DIAGNOSIS — I48.11 LONGSTANDING PERSISTENT ATRIAL FIBRILLATION (HCC): ICD-10-CM

## 2021-06-18 DIAGNOSIS — Z79.4 TYPE 2 DIABETES MELLITUS WITHOUT COMPLICATION, WITH LONG-TERM CURRENT USE OF INSULIN (HCC): ICD-10-CM

## 2021-06-18 DIAGNOSIS — M46.46 DISCITIS OF LUMBAR REGION: ICD-10-CM

## 2021-06-18 DIAGNOSIS — M46.26 OSTEOMYELITIS OF LUMBAR SPINE (HCC): ICD-10-CM

## 2021-06-18 DIAGNOSIS — F41.9 ANXIETY: Primary | ICD-10-CM

## 2021-06-18 DIAGNOSIS — I10 ESSENTIAL HYPERTENSION: ICD-10-CM

## 2021-06-18 PROCEDURE — 99305 1ST NF CARE MODERATE MDM 35: CPT | Performed by: FAMILY MEDICINE

## 2021-06-18 ASSESSMENT — ENCOUNTER SYMPTOMS
SHORTNESS OF BREATH: 0
WHEEZING: 0
SORE THROAT: 0
VOMITING: 0
DIARRHEA: 0
BACK PAIN: 1
EYE DISCHARGE: 0
CONSTIPATION: 0
COUGH: 0
BLOOD IN STOOL: 0
EYE REDNESS: 0
NAUSEA: 0
EYE PAIN: 0

## 2021-06-22 ENCOUNTER — OUTSIDE SERVICES (OUTPATIENT)
Dept: FAMILY MEDICINE CLINIC | Age: 81
End: 2021-06-22

## 2021-06-23 NOTE — PROGRESS NOTES
NAME: Eliezer Garcia   DATE: 21  ROOM #: 41  CODE STATUS: DNR-CCA  REASON FOR VISIT: Pain  : 41    HPI: Pt seen and examined at bedside. History is partially obtained from chart review. Follow up to patient pain, change in pain medication. She is lying in bed, calm, intermittently grimacing and holding onto her hips. Stating that the pain is at a \"9\" that is stabbing and often burning. Feels that her pain rarely goes below this level but does go to a 10 when she is moved. Usually has pain in her back, however this is radiating to her bilat hips and pelvis today as she has had to be on and off the bedpan, which has been very aggravating to the pain. States that the Percocet takes the edge off but it does not last.     Past Medical History:   Diagnosis Date    Anxiety      Atrial fibrillation (HCC)      Cardiac murmur      Hypercholesterolemia       no medication recommended - just dietary recommendations    Hypertension       130's/80-90's at home, white coat hypertension, stress test  prior to [facility]KA [facility] 112 Microscopic hematuria      Obesity (BMI 30-39. 9)      Snoring       no apnea, no waking coughing or choking, no am headache, BMI 36, neck circ. 15 inches    Type II or unspecified type diabetes mellitus without mention of complication, not stated as uncontrolled       FSBS              Past Surgical History         Past Surgical History:   Procedure Laterality Date    JOINT REPLACEMENT Left      left hip replacement--Dr Ellison    KNEE ARTHROPLASTY Bilateral       left 2006 right 2007    ORIF DISTAL RADIUS FRACTURE Right 2019     ORIF RIGHT DISTAL RADIUS performed by Rosi Annad MD at [place] [id number], medications, labs and radiology reports were reviewed through chart review. Patients past medical, surgical, social and family history have been reviewed and updates were made where appropriate.     Review of Systems  Positive responses are indicated with +    Constitutional:  Fever, Chills,+ Fatigue, Unexpected changes in weight  Eyes:  Eye discharge, Eye pain, Eye redness, Visual disturbances   HENT:  Ear pain, Tinnitus, Nosebleeds, Trouble swallowing  Cardiovascular:  Chest Pain, Palpitations  Respiratory:  Cough, Wheezing, Shortness of breath, Chest tightness, Apnea  Gastrointestinal:  Nausea, Vomiting, Diarrhea, Constipation, Heartburn, Blood in stool  Genitourinary:  Difficulty or painful urination, Flank pain, Change in frequency, Urgency  Skin:  Color change, Rash, Itching, Wound  Psychiatric:  Hallucinations, Anxiety, Depression, Suicidal ideation  Hematological:  Enlarged glands, Easy bleeding, Easily bruising  Musculoskeletal:  +Joint pain, +Back pain, Gait problems, Joint swelling, +Myalgias  Neurological:  Dizziness, Headaches, Presyncope, Numbness, Seizures, Tremors  Allergy:  Environmental allergies, Food allergies  Endocrine:  Heat Intolerance, Cold Intolerance, Polydipsia, Polyphagia, Polyuria      PHYSICAL EXAM  Vital Signs: T, BP, P, RR, Wt  98, 138/82, 77, 16, 178#  General Appearance: elderly, debilitated appearing female, in no acute distress  Head: normocephalic and atraumatic  Eyes: pupils equal, round, and reactive to light, conjunctivae and eye lids without erythema  ENT: external ear normal bilaterally, nose without deformity, dentition is normal for age, no lip or gum lesions noted  Pulmonary/Chest: No respiratory distress or retractions  Extremities: no cyanosis, clubbing or edema of the lower extremities  Musculoskeletal: No joint swelling or gross deformity   Neuro:  Alert,  normal speech, no focal findings or movement disorder noted  Psych:  Normal affect without evidence of depression or anxiety, insight and judgement are appropriate, memory appears intact  Skin: warm and dry, no rash or erythema    ASSESSMENT AND PLAN  1. Discitis/Osteomyelitis: Continues to have pain that is rated at a \"9\".   Increase frequency of Percocet 7.5/325 mg to every 4 hours PRN. Increase dosing of Flexeril from 5 mh to 7.5 mg TID.     Plan of care reviewed with Dr Hi Liang, CNP

## 2021-06-25 ENCOUNTER — OUTSIDE SERVICES (OUTPATIENT)
Dept: FAMILY MEDICINE CLINIC | Age: 81
End: 2021-06-25
Payer: MEDICARE

## 2021-06-25 DIAGNOSIS — R11.0 NAUSEA: ICD-10-CM

## 2021-06-25 PROCEDURE — 99308 SBSQ NF CARE LOW MDM 20: CPT | Performed by: NURSE PRACTITIONER

## 2021-06-25 NOTE — PROGRESS NOTES
NAME: Zhang Montero   DATE: 21  ROOM #: 41  CODE STATUS: DNR-CCA  REASON FOR VISIT: Nausea  : 41    HPI: Pt seen and examined at bedside. History is partially obtained from chart review. Patient reporting nausea that occurs in the morning, especially after meds and sitting up in her chair. No other associated complaints     Past Medical History:   Diagnosis Date    Anxiety      Atrial fibrillation (HCC)      Cardiac murmur      Hypercholesterolemia       no medication recommended - just dietary recommendations    Hypertension       130's/80-90's at home, white coat hypertension, stress test  prior to TKA at [facility] 112 Microscopic hematuria      Obesity (BMI 30-39. 9)      Snoring       no apnea, no waking coughing or choking, no am headache, BMI 36, neck circ. 15 inches    Type II or unspecified type diabetes mellitus without mention of complication, not stated as uncontrolled       FSBS              Past Surgical History         Past Surgical History:   Procedure Laterality Date    JOINT REPLACEMENT Left      left hip replacement--Dr Ellison    KNEE ARTHROPLASTY Bilateral       left 2006 right     ORIF DISTAL RADIUS FRACTURE Right 2019     ORIF RIGHT DISTAL RADIUS performed by Glory Bryan MD at [place] [address], medications, labs and radiology reports were reviewed through chart review. Patients past medical, surgical, social and family history have been reviewed and updates were made where appropriate.     Review of Systems  Positive responses are indicated with +    Constitutional:  Fever, Chills,+ Fatigue, Unexpected changes in weight  Eyes:  Eye discharge, Eye pain, Eye redness, Visual disturbances   HENT:  Ear pain, Tinnitus, Nosebleeds, Trouble swallowing  Cardiovascular:  Chest Pain, Palpitations  Respiratory:  Cough, Wheezing, Shortness of breath, Chest tightness, Apnea  Gastrointestinal:  +Nausea, Vomiting, Diarrhea, Constipation, Heartburn, Blood in stool  Genitourinary:  Difficulty or painful urination, Flank pain, Change in frequency, Urgency  Skin:  Color change, Rash, Itching, Wound  Psychiatric:  Hallucinations, Anxiety, Depression, Suicidal ideation  Hematological:  Enlarged glands, Easy bleeding, Easily bruising  Musculoskeletal:  Joint pain, Back pain, Gait problems, Joint swelling, Myalgias  Neurological:  Dizziness, Headaches, Presyncope, Numbness, Seizures, Tremors  Allergy:  Environmental allergies, Food allergies  Endocrine:  Heat Intolerance, Cold Intolerance, Polydipsia, Polyphagia, Polyuria      PHYSICAL EXAM  Vital Signs: T, BP, P, RR, Wt  98, 173/62, 77, 16, 176#  General Appearance: elderly,tired and  debilitated appearing female, in no acute distress  Head: normocephalic and atraumatic  Eyes: pupils equal, round, and reactive to light, conjunctivae and eye lids without erythema  ENT: external ear normal bilaterally, nose without deformity, dentition is normal for age, no lip or gum lesions noted  Pulmonary/Chest: No respiratory distress or retractions  Extremities: no cyanosis, clubbing or edema of the lower extremities  Musculoskeletal: No joint swelling or gross deformity   Neuro:  Alert,  normal speech, no focal findings or movement disorder noted  Psych:  Normal affect without evidence of depression or anxiety, insight and judgement are appropriate, memory appears intact  Skin: warm and dry, no rash or erythema    ASSESSMENT AND PLAN  1. Nausea:  Start Zofran 4 mg po every 8 hr prn    Plan of care reviewed with Dr Mireya Calderon, CNP

## 2021-07-08 ENCOUNTER — OUTSIDE SERVICES (OUTPATIENT)
Dept: UROLOGY | Age: 81
End: 2021-07-08
Payer: MEDICARE

## 2021-07-08 ENCOUNTER — TELEPHONE (OUTPATIENT)
Dept: UROLOGY | Age: 81
End: 2021-07-08

## 2021-07-08 VITALS
HEART RATE: 70 BPM | RESPIRATION RATE: 16 BRPM | TEMPERATURE: 97.4 F | DIASTOLIC BLOOD PRESSURE: 40 MMHG | SYSTOLIC BLOOD PRESSURE: 175 MMHG

## 2021-07-08 DIAGNOSIS — R33.9 URINARY RETENTION: ICD-10-CM

## 2021-07-08 PROCEDURE — 99309 SBSQ NF CARE MODERATE MDM 30: CPT | Performed by: NURSE PRACTITIONER

## 2021-07-08 NOTE — PROGRESS NOTES
[facility]  Visit Date: 7/8/2021    HPI:     Becca Rankin is a new patient who was referred here by [facility] for uti and pedro management. Pt recently discharged from [facility]. Had bilateral L2-L3 laminectomies and L2-L3 discectomy by Dr. Almon Closs. She was noted to have discitis as well as UTI. She is on IV antibiotics currently. She has pedro in place draining yellow urine. She reports having pedro since hospitalization. She reports having void trials, and having bladder scans done which ranged from 700-900 ml but she was able to void some on her own. She denies any prior hx of UTI, or urological problems. Never seen Urologist before. No dysuria or hematuria. No hx of kidney stones. Having lower leg pain and trouble with constipation. She would like catheter to be removed. Becca Rankin is by herself, seen at [facility]. History gathered from patient and medical records. Past Medical History:   Diagnosis Date    Anxiety     Atrial fibrillation [facility])     Cardiac murmur     Hypercholesterolemia     no medication recommended - just dietary recommendations    Hypertension     130's/80-90's at home, white coat hypertension, stress test 2007 prior to TKA at [facility] 112 Microscopic hematuria     Obesity (BMI 30-39. 9)     Snoring     no apnea, no waking coughing or choking, no am headache, BMI 36, neck circ.  15 inches    Type II or unspecified type diabetes mellitus without mention of complication, not stated as uncontrolled     FSBS        Past Surgical History:   Procedure Laterality Date    JOINT REPLACEMENT Left 2013    left hip replacement--Dr Ellison    KNEE ARTHROPLASTY Bilateral     left 2006 right 2007    ORIF DISTAL RADIUS FRACTURE Right 7/30/2019    ORIF RIGHT DISTAL RADIUS performed by Farrah Shirley MD at [place] [address] History   Problem Relation Age of Onset    Heart Disease Mother 79        CABG, [place]    Stroke Mother     Heart Disease Father     Cancer Father lung    Arthritis Father     Cancer Sister         brain tumor    Arthritis Brother     Arthritis Sister        Social History     Tobacco Use    Smoking status: Never Smoker    Smokeless tobacco: Never Used   Substance Use Topics    Alcohol use: Never      Current Outpatient Medications   Medication Sig Dispense Refill    cyclobenzaprine (FLEXERIL) 10 MG tablet cyclobenzaprine Cyclobenzaprine Active 10 MG PO Twice Daily as needed 12 April 8th, 2021 8:40pm 04-  Amberly Blandon ([id number])      atorvastatin (LIPITOR) 40 MG tablet Take 1 tablet by mouth daily 90 tablet 1    ramipril (ALTACE) 10 MG capsule Take 20 mg by mouth daily       amiodarone (CORDARONE) 200 MG tablet Take 1 tablet by mouth daily      metFORMIN (GLUCOPHAGE) 1000 MG tablet Take 1 tablet by mouth 2 times daily 180 tablet 3    labetalol (NORMODYNE) 100 MG tablet Take 100 mg by mouth 2 times daily      furosemide (LASIX) 40 MG tablet Take 40 mg by mouth See Admin Instructions Qd on M,W,F      magnesium oxide (MAG-OX) 400 MG tablet Take 400 mg by mouth daily      potassium chloride (MICRO-K) 10 MEQ extended release capsule Take 10 mEq by mouth See Admin Instructions Qd on M,W,F      isosorbide mononitrate (IMDUR) 30 MG extended release tablet Take 30 mg by mouth daily      amLODIPine (NORVASC) 5 MG tablet Take 1 tablet by mouth daily      LANTUS SOLOSTAR 100 UNIT/ML injection pen Inject 30 Units into the skin daily 15 pen 3    apixaban (ELIQUIS) 5 MG TABS tablet Take 1 tablet by mouth 2 times daily 60 tablet 5    metoprolol tartrate (LOPRESSOR) 25 MG tablet Take 3 tablets by mouth 2 times daily 180 tablet 5    Insulin Pen Needle 31G X 8 MM MISC 1 each by Does not apply route daily 100 each 3    Omega-3 Fatty Acids (FISH OIL) 1000 MG CAPS Take 3,000 mg by mouth 2 times daily      Multiple Vitamins-Minerals (OCUVITE PRESERVISION PO) Take by mouth daily      Multiple Vitamins-Minerals (THERAPEUTIC MULTIVITAMIN-MINERALS) tablet Take 1 tablet by mouth daily. No current facility-administered medications for this visit. Allergies   Allergen Reactions    Trimethoprim Hives    Sulfa Antibiotics Hives       ROS:  Constitutional: Negative for chills, fatigue, fever, or weight loss. Eyes: Denies reported visual changes. ENT: Denies headache, difficulty swallowing, nose bleeds, ringing in ears, or earaches. Cardiovascular: Negative for chest pain, palpitations, tachycardia or edema. Respiratory: Denies cough or SOB. GI: + nausea  : See HPI  Musculoskeletal: recent back surgery, leg weakness/pain  Neurological: The patient denies any symptoms of neurological impairment or TIA's; no history of stroke. Lymphatic: Denies swollen glands in neck, axillary or inguinal areas. Psychiatric: Denies anxiety or depression. Skin: Denies rash or lesions. The remainder of the complete ROS is negative    PHYSICAL EXAM:  VITALS:  BP (!) 175/40   Pulse 70   Temp 97.4 °F (36.3 °C)   Resp 16 . Constitutional:    Alert and oriented times 3, no acute distress and cooperative to examination with appropriate mood and affect. HEENT:   Head:         Normocephalic and atraumatic. Mouth/Throat:          Mucous membranes are normal.   Eyes:         EOM are normal. No scleral icterus. Nose:    The external appearance of the nose is normal  Ears: The ears appear normal to external inspection     Cardiovascular:        Normal rate, regular rhythm, S1 S2 heart sounds. murmur  Pulmonary/Chest:       Chest symmetric with normal A/P diameter, no wheezes, rales, or rhonchi noted. Normal respiratory rate and rhthym. No use of accessory muscles. Abdominal:          Soft. No tenderness. Active bowel sounds. Genitalia: pedro draining yellow urine    Extremities:    No cyanosis, clubbing, or edema present. Neurological:    Alert and oriented.      DATA:  CBC:   Lab Results   Component Value Date    WBC 5.7 06/29/2021    RBC 3.26 06/29/2021    HGB 9.3 06/29/2021    HCT 28.0 06/29/2021    MCV 85.9 06/29/2021    MCH 28.5 06/29/2021    MCHC 33.2 06/29/2021    RDW 17.4 06/29/2021     06/29/2021     BMP:    Lab Results   Component Value Date     06/29/2021    K 3.9 06/29/2021    CL 99 06/29/2021    CO2 28 06/29/2021    BUN 14 06/29/2021    CREATININE 0.57 06/29/2021    CALCIUM 8.60 06/29/2021    GLUCOSE 138 06/29/2021     BUN/Creatinine:    Lab Results   Component Value Date    BUN 14 06/29/2021    CREATININE 0.57 06/29/2021     Magnesium:    Lab Results   Component Value Date    MG 1.6 03/26/2021     Phosphorus:  No results found for: PHOS  PT/INR:    Lab Results   Component Value Date    PROTIME 31.0 04/13/2020    INR 2.72 04/13/2020     U/A:    Lab Results   Component Value Date    COLORU Yellow 06/29/2021    PHUR 5.5 06/13/2014    WBCUA 21-50 06/29/2021    RBCUA 51-75 06/29/2021    MUCUS Present 06/29/2021    YEAST Present 06/29/2021    BACTERIA 1+ 06/29/2021    LEUKOCYTESUR Moderate 06/29/2021    LEUKOCYTESUR LARGE 06/13/2014    UROBILINOGEN <2.0 E.U./dL 06/29/2021    BILIRUBINUR Moderate 06/29/2021    BLOODU TRACE 06/13/2014    GLUCOSEU Negative 06/29/2021       Assessment & Plan:       L2 compression fracture  UTI  Discitis  S/P Bilateral L2-3 laminectomies, and L2-3 discectomy by Dr. Ann Kaiser. Hx of DM  Constipation    Pt likely has neurogenic bladder. She denies seeing any Urologist while in the hospital.   She would like pedro catheter out if possible. After discussing with pt all potential causes of urinary retention, and discussing void trial and plan of care, it was discovered that pt has fu with [facility] in 2 weeks where a void trial will be performed there. Pt has decided to keep fu with [facility] at this time. Keep catheter in place as instructed by [facility].        Electronically signed by BRENDAN Tucker CNP on 7/8/2021 at 12:57 PM

## 2021-07-09 VITALS
HEART RATE: 75 BPM | WEIGHT: 180 LBS | SYSTOLIC BLOOD PRESSURE: 179 MMHG | BODY MASS INDEX: 31.39 KG/M2 | DIASTOLIC BLOOD PRESSURE: 95 MMHG | TEMPERATURE: 98 F | RESPIRATION RATE: 16 BRPM

## 2021-07-09 ASSESSMENT — ENCOUNTER SYMPTOMS
EYE DISCHARGE: 0
CONSTIPATION: 0
COUGH: 0
EYE REDNESS: 0
BACK PAIN: 1
WHEEZING: 0
SHORTNESS OF BREATH: 0
VOMITING: 0
EYE PAIN: 0
SORE THROAT: 0
DIARRHEA: 0
BLOOD IN STOOL: 0

## 2021-07-10 ENCOUNTER — OUTSIDE SERVICES (OUTPATIENT)
Dept: FAMILY MEDICINE CLINIC | Age: 81
End: 2021-07-10
Payer: MEDICARE

## 2021-07-10 DIAGNOSIS — M46.46 DISCITIS OF LUMBAR REGION: ICD-10-CM

## 2021-07-10 DIAGNOSIS — E11.9 TYPE 2 DIABETES MELLITUS WITHOUT COMPLICATION, WITH LONG-TERM CURRENT USE OF INSULIN (HCC): Primary | ICD-10-CM

## 2021-07-10 DIAGNOSIS — I48.11 LONGSTANDING PERSISTENT ATRIAL FIBRILLATION (HCC): ICD-10-CM

## 2021-07-10 DIAGNOSIS — I10 ESSENTIAL HYPERTENSION: ICD-10-CM

## 2021-07-10 DIAGNOSIS — M48.062 SPINAL STENOSIS OF LUMBAR REGION WITH NEUROGENIC CLAUDICATION: ICD-10-CM

## 2021-07-10 DIAGNOSIS — Z79.4 TYPE 2 DIABETES MELLITUS WITHOUT COMPLICATION, WITH LONG-TERM CURRENT USE OF INSULIN (HCC): Primary | ICD-10-CM

## 2021-07-10 DIAGNOSIS — F41.9 ANXIETY: ICD-10-CM

## 2021-07-10 PROCEDURE — 99305 1ST NF CARE MODERATE MDM 35: CPT | Performed by: FAMILY MEDICINE

## 2021-07-10 ASSESSMENT — ENCOUNTER SYMPTOMS: NAUSEA: 1

## 2021-07-10 NOTE — PROGRESS NOTES
Hilda Burnett is a [de-identified] y.o. female that is being seen at [facility] for Back Pain      Hilda Burnett  1940  7/10/21      HPI:  Patient seen and examined at bedside. History obtained from patient and chart. Patient was recently admitted to [facility] for treatment of UTI. Per DC summary:    PT is a 81-year-old female with past medical history of atrial fibrillation, DM type II,  hypertension, DVT, stroke who was recently managed for discitis, osteomyelitis of the  lumbar spine and discharged on 6/15 who presented  due to generalized weakness, dizziness  with associated nausea and back pain. At the emergency room, vital signs were stable, UA  noted to be abnormal,IV antibiotics started. Repeat MRI lumbar spine showed discitis and  osteomyelitis at L2-L3, laminectomy L2-3 with possible abscess posteriorly external to the  spinal canal, no insignificant interval changes noted overall. Dr Vignesh Ko and DR Martin Johnson  consulted who both agree patient does not need any surgical intervention at this time. Patient was to discharge previousy on 6 wks of antiobiotic however this was not done at  [facility]. Patient has PICC. Patient is feeling much improved, labs stable, afebrile. Patient  working with therapy. Dr Martin Johnson wrote for Rx for Ceftriaxone and this will be continued for  6 wks. Patient stable for d/c to SNF, pre cert approved    Today, patient states that she has been feeling very nauseous since her recent admission. No vomiting, but impairs her ability to eat. Seemed to have started with the antibiotic restart. No GERD sx. Still having quite a bit of back pain. Percocet has helped, but does not seem strong enough. She requested an increase today which I did. Denies having any constipation. I have reviewed the patient's past medical history, past surgical history, allergies,medications, social and family history and I have made updates where appropriate.     Past Medical History:   Diagnosis Date    Anxiety     Negative for myalgias. Skin: Negative for rash. Allergic/Immunologic: Negative for environmental allergies. Neurological: Negative for dizziness, tremors, seizures, weakness and headaches. Hematological: Does not bruise/bleed easily. Psychiatric/Behavioral: Negative for hallucinations and suicidal ideas. The patient is not nervous/anxious. PHYSICAL EXAM:    BP (!) 179/95   Pulse 75   Temp 98 °F (36.7 °C)   Resp 16   Wt 180 lb (81.6 kg)   BMI 31.39 kg/m²       Physical Exam  Vitals and nursing note reviewed. Constitutional:       General: She is in acute distress (appears very uncomfortable). Appearance: She is well-developed. She is not diaphoretic. HENT:      Head: Normocephalic and atraumatic. Right Ear: Hearing, tympanic membrane, ear canal and external ear normal.      Left Ear: Hearing, tympanic membrane, ear canal and external ear normal.      Nose: Nose normal.      Mouth/Throat:      Dentition: Normal dentition. Pharynx: Uvula midline. Eyes:      General: Lids are normal.         Right eye: No discharge. Left eye: No discharge. Conjunctiva/sclera: Conjunctivae normal.      Pupils: Pupils are equal, round, and reactive to light. Neck:      Thyroid: No thyromegaly. Trachea: No tracheal deviation. Cardiovascular:      Rate and Rhythm: Normal rate and regular rhythm. Heart sounds: Normal heart sounds. No murmur heard. No friction rub. No gallop. Comments: No clubbing, cyanosis or edema of the LEs bilaterally  Pulmonary:      Effort: Pulmonary effort is normal. No respiratory distress. Breath sounds: Normal breath sounds. No wheezing or rales. Abdominal:      General: There is no distension. Palpations: Abdomen is soft. There is no mass. Tenderness: There is no abdominal tenderness. There is no guarding or rebound. Hernia: No hernia is present. Musculoskeletal:         General: No tenderness.       Cervical back: Normal range of motion and neck supple. Lymphadenopathy:      Head:      Right side of head: No tonsillar or preauricular adenopathy. Left side of head: No tonsillar or preauricular adenopathy. Cervical: No cervical adenopathy. Upper Body:      Right upper body: No supraclavicular adenopathy. Left upper body: No supraclavicular adenopathy. Skin:     General: Skin is warm and dry. Findings: No erythema or rash. Neurological:      Mental Status: She is alert. Cranial Nerves: No cranial nerve deficit. Motor: No tremor or abnormal muscle tone. Coordination: Coordination normal.      Gait: Gait normal.   Psychiatric:         Behavior: Behavior normal.         Thought Content: Thought content normal.         Judgment: Judgment normal.         ASSESSMENT & PLAN  Shailesh Flannery was seen today for back pain.     Diagnoses and all orders for this visit:    Type 2 diabetes mellitus without complication, with long-term current use of insulin ([address]    Spinal stenosis of lumbar region with neurogenic claudication    Longstanding persistent atrial fibrillation (HCC)    Essential hypertension    Discitis of lumbar region    Anxiety    Nausea:  Stop zofran, will start phenergan  Discitis/Osteomyelitis:  Cont Percocet, flexeril, ceftriaxone, PT, OT  HTN:  Cont imdur, Lasix, Labetolol  DM2:  Cont metformin, Lantus, Lispro  AFib:  Cont eliquis    All copied or forwarded information in the progress note was verified by me to be accurate at the time of visit  Patient's past medical, surgical, social and family history were reviewed and updated

## 2021-07-24 ENCOUNTER — HOSPITAL ENCOUNTER (INPATIENT)
Age: 81
LOS: 16 days | Discharge: HOME HEALTH | DRG: 884 | End: 2021-08-11
Attending: EMERGENCY MEDICINE
Payer: MEDICARE

## 2021-07-24 DIAGNOSIS — R53.1 GENERAL WEAKNESS: Primary | ICD-10-CM

## 2021-07-24 DIAGNOSIS — M46.26 OSTEOMYELITIS OF LUMBAR SPINE (HCC): ICD-10-CM

## 2021-07-24 DIAGNOSIS — M46.46 DISCITIS OF LUMBAR REGION: ICD-10-CM

## 2021-07-24 PROBLEM — N39.0 UTI (URINARY TRACT INFECTION): Status: ACTIVE | Noted: 2021-07-24

## 2021-07-24 LAB
ANION GAP SERPL CALCULATED.3IONS-SCNC: 12 MEQ/L (ref 8–16)
BASOPHILS # BLD: 1.1 %
BASOPHILS ABSOLUTE: 0.1 THOU/MM3 (ref 0–0.1)
BUN BLDV-MCNC: 10 MG/DL (ref 7–22)
CALCIUM SERPL-MCNC: 9.6 MG/DL (ref 8.5–10.5)
CHLORIDE BLD-SCNC: 97 MEQ/L (ref 98–111)
CO2: 27 MEQ/L (ref 23–33)
CREAT SERPL-MCNC: 0.4 MG/DL (ref 0.4–1.2)
EKG ATRIAL RATE: 81 BPM
EKG P AXIS: 31 DEGREES
EKG P-R INTERVAL: 194 MS
EKG Q-T INTERVAL: 424 MS
EKG QRS DURATION: 90 MS
EKG QTC CALCULATION (BAZETT): 492 MS
EKG R AXIS: -30 DEGREES
EKG T AXIS: 111 DEGREES
EKG VENTRICULAR RATE: 81 BPM
EOSINOPHIL # BLD: 0.8 %
EOSINOPHILS ABSOLUTE: 0 THOU/MM3 (ref 0–0.4)
ERYTHROCYTE [DISTWIDTH] IN BLOOD BY AUTOMATED COUNT: 16.1 % (ref 11.5–14.5)
ERYTHROCYTE [DISTWIDTH] IN BLOOD BY AUTOMATED COUNT: 51.9 FL (ref 35–45)
GFR SERPL CREATININE-BSD FRML MDRD: > 90 ML/MIN/1.73M2
GLUCOSE BLD-MCNC: 133 MG/DL (ref 70–108)
GLUCOSE BLD-MCNC: 166 MG/DL (ref 70–108)
HCT VFR BLD CALC: 33.4 % (ref 37–47)
HEMOGLOBIN: 10 GM/DL (ref 12–16)
IMMATURE GRANS (ABS): 0.02 THOU/MM3 (ref 0–0.07)
IMMATURE GRANULOCYTES: 0.3 %
LYMPHOCYTES # BLD: 26 %
LYMPHOCYTES ABSOLUTE: 1.6 THOU/MM3 (ref 1–4.8)
MAGNESIUM: 1.6 MG/DL (ref 1.6–2.4)
MCH RBC QN AUTO: 26.3 PG (ref 26–33)
MCHC RBC AUTO-ENTMCNC: 29.9 GM/DL (ref 32.2–35.5)
MCV RBC AUTO: 87.9 FL (ref 81–99)
MONOCYTES # BLD: 8 %
MONOCYTES ABSOLUTE: 0.5 THOU/MM3 (ref 0.4–1.3)
NUCLEATED RED BLOOD CELLS: 0 /100 WBC
OSMOLALITY CALCULATION: 272.9 MOSMOL/KG (ref 275–300)
PLATELET # BLD: 363 THOU/MM3 (ref 130–400)
PMV BLD AUTO: 8.8 FL (ref 9.4–12.4)
POTASSIUM REFLEX MAGNESIUM: 3.2 MEQ/L (ref 3.5–5.2)
RBC # BLD: 3.8 MILL/MM3 (ref 4.2–5.4)
SEG NEUTROPHILS: 63.8 %
SEGMENTED NEUTROPHILS ABSOLUTE COUNT: 3.9 THOU/MM3 (ref 1.8–7.7)
SODIUM BLD-SCNC: 136 MEQ/L (ref 135–145)
TROPONIN T: < 0.01 NG/ML
WBC # BLD: 6.1 THOU/MM3 (ref 4.8–10.8)

## 2021-07-24 PROCEDURE — G0378 HOSPITAL OBSERVATION PER HR: HCPCS

## 2021-07-24 PROCEDURE — 84484 ASSAY OF TROPONIN QUANT: CPT

## 2021-07-24 PROCEDURE — 36415 COLL VENOUS BLD VENIPUNCTURE: CPT

## 2021-07-24 PROCEDURE — 82948 REAGENT STRIP/BLOOD GLUCOSE: CPT

## 2021-07-24 PROCEDURE — 85025 COMPLETE CBC W/AUTO DIFF WBC: CPT

## 2021-07-24 PROCEDURE — 6370000000 HC RX 637 (ALT 250 FOR IP): Performed by: INTERNAL MEDICINE

## 2021-07-24 PROCEDURE — 80048 BASIC METABOLIC PNL TOTAL CA: CPT

## 2021-07-24 PROCEDURE — 99220 PR INITIAL OBSERVATION CARE/DAY 70 MINUTES: CPT | Performed by: INTERNAL MEDICINE

## 2021-07-24 PROCEDURE — 93005 ELECTROCARDIOGRAM TRACING: CPT | Performed by: EMERGENCY MEDICINE

## 2021-07-24 PROCEDURE — 2580000003 HC RX 258: Performed by: INTERNAL MEDICINE

## 2021-07-24 PROCEDURE — 96365 THER/PROPH/DIAG IV INF INIT: CPT

## 2021-07-24 PROCEDURE — 83735 ASSAY OF MAGNESIUM: CPT

## 2021-07-24 PROCEDURE — 99283 EMERGENCY DEPT VISIT LOW MDM: CPT

## 2021-07-24 PROCEDURE — 6360000002 HC RX W HCPCS: Performed by: INTERNAL MEDICINE

## 2021-07-24 RX ORDER — SODIUM CHLORIDE 0.9 % (FLUSH) 0.9 %
10 SYRINGE (ML) INJECTION EVERY 12 HOURS SCHEDULED
Status: DISCONTINUED | OUTPATIENT
Start: 2021-07-24 | End: 2021-08-11 | Stop reason: HOSPADM

## 2021-07-24 RX ORDER — FUROSEMIDE 40 MG/1
20 TABLET ORAL DAILY
Status: DISCONTINUED | OUTPATIENT
Start: 2021-07-25 | End: 2021-08-11 | Stop reason: HOSPADM

## 2021-07-24 RX ORDER — POLYETHYLENE GLYCOL 3350 17 G/17G
17 POWDER, FOR SOLUTION ORAL DAILY PRN
Status: DISCONTINUED | OUTPATIENT
Start: 2021-07-24 | End: 2021-08-11 | Stop reason: HOSPADM

## 2021-07-24 RX ORDER — POTASSIUM CHLORIDE 20 MEQ/1
40 TABLET, EXTENDED RELEASE ORAL PRN
Status: DISCONTINUED | OUTPATIENT
Start: 2021-07-24 | End: 2021-08-11 | Stop reason: HOSPADM

## 2021-07-24 RX ORDER — AMIODARONE HYDROCHLORIDE 200 MG/1
200 TABLET ORAL DAILY
Status: DISCONTINUED | OUTPATIENT
Start: 2021-07-24 | End: 2021-08-11 | Stop reason: HOSPADM

## 2021-07-24 RX ORDER — POTASSIUM CHLORIDE 750 MG/1
10 TABLET, FILM COATED, EXTENDED RELEASE ORAL
Status: DISCONTINUED | OUTPATIENT
Start: 2021-07-26 | End: 2021-08-11 | Stop reason: HOSPADM

## 2021-07-24 RX ORDER — POTASSIUM CHLORIDE 20 MEQ/1
40 TABLET, EXTENDED RELEASE ORAL ONCE
Status: COMPLETED | OUTPATIENT
Start: 2021-07-24 | End: 2021-07-24

## 2021-07-24 RX ORDER — ONDANSETRON 2 MG/ML
4 INJECTION INTRAMUSCULAR; INTRAVENOUS EVERY 6 HOURS PRN
Status: DISCONTINUED | OUTPATIENT
Start: 2021-07-24 | End: 2021-08-11 | Stop reason: HOSPADM

## 2021-07-24 RX ORDER — RAMIPRIL 10 MG/1
20 CAPSULE ORAL DAILY
Status: DISCONTINUED | OUTPATIENT
Start: 2021-07-24 | End: 2021-08-11 | Stop reason: HOSPADM

## 2021-07-24 RX ORDER — CYCLOBENZAPRINE HCL 10 MG
10 TABLET ORAL 2 TIMES DAILY
Status: DISCONTINUED | OUTPATIENT
Start: 2021-07-24 | End: 2021-08-11 | Stop reason: HOSPADM

## 2021-07-24 RX ORDER — ATORVASTATIN CALCIUM 40 MG/1
40 TABLET, FILM COATED ORAL NIGHTLY
Status: DISCONTINUED | OUTPATIENT
Start: 2021-07-24 | End: 2021-08-11 | Stop reason: HOSPADM

## 2021-07-24 RX ORDER — ACETAMINOPHEN 325 MG/1
650 TABLET ORAL EVERY 6 HOURS PRN
Status: DISCONTINUED | OUTPATIENT
Start: 2021-07-24 | End: 2021-08-11 | Stop reason: HOSPADM

## 2021-07-24 RX ORDER — POTASSIUM CHLORIDE 7.45 MG/ML
10 INJECTION INTRAVENOUS PRN
Status: DISCONTINUED | OUTPATIENT
Start: 2021-07-24 | End: 2021-08-11 | Stop reason: HOSPADM

## 2021-07-24 RX ORDER — SODIUM CHLORIDE 0.9 % (FLUSH) 0.9 %
10 SYRINGE (ML) INJECTION PRN
Status: DISCONTINUED | OUTPATIENT
Start: 2021-07-24 | End: 2021-08-11 | Stop reason: HOSPADM

## 2021-07-24 RX ORDER — SODIUM CHLORIDE 9 MG/ML
25 INJECTION, SOLUTION INTRAVENOUS PRN
Status: DISCONTINUED | OUTPATIENT
Start: 2021-07-24 | End: 2021-08-11 | Stop reason: HOSPADM

## 2021-07-24 RX ORDER — AMLODIPINE BESYLATE 5 MG/1
5 TABLET ORAL DAILY
Status: DISCONTINUED | OUTPATIENT
Start: 2021-07-24 | End: 2021-08-11 | Stop reason: HOSPADM

## 2021-07-24 RX ORDER — ISOSORBIDE MONONITRATE 30 MG/1
30 TABLET, EXTENDED RELEASE ORAL DAILY
Status: DISCONTINUED | OUTPATIENT
Start: 2021-07-24 | End: 2021-08-11 | Stop reason: HOSPADM

## 2021-07-24 RX ORDER — LABETALOL 100 MG/1
100 TABLET, FILM COATED ORAL 2 TIMES DAILY
Status: DISCONTINUED | OUTPATIENT
Start: 2021-07-24 | End: 2021-08-11 | Stop reason: HOSPADM

## 2021-07-24 RX ORDER — ACETAMINOPHEN 650 MG/1
650 SUPPOSITORY RECTAL EVERY 6 HOURS PRN
Status: DISCONTINUED | OUTPATIENT
Start: 2021-07-24 | End: 2021-08-11 | Stop reason: HOSPADM

## 2021-07-24 RX ORDER — INSULIN GLARGINE 100 [IU]/ML
30 INJECTION, SOLUTION SUBCUTANEOUS NIGHTLY
Status: DISCONTINUED | OUTPATIENT
Start: 2021-07-24 | End: 2021-08-11 | Stop reason: HOSPADM

## 2021-07-24 RX ORDER — ONDANSETRON 4 MG/1
4 TABLET, ORALLY DISINTEGRATING ORAL EVERY 8 HOURS PRN
Status: DISCONTINUED | OUTPATIENT
Start: 2021-07-24 | End: 2021-08-11 | Stop reason: HOSPADM

## 2021-07-24 RX ORDER — HYDROCODONE BITARTRATE AND ACETAMINOPHEN 5; 325 MG/1; MG/1
1 TABLET ORAL EVERY 6 HOURS PRN
Status: DISCONTINUED | OUTPATIENT
Start: 2021-07-24 | End: 2021-08-11 | Stop reason: HOSPADM

## 2021-07-24 RX ORDER — MAGNESIUM SULFATE IN WATER 40 MG/ML
2000 INJECTION, SOLUTION INTRAVENOUS PRN
Status: DISCONTINUED | OUTPATIENT
Start: 2021-07-24 | End: 2021-08-11 | Stop reason: HOSPADM

## 2021-07-24 RX ADMIN — SODIUM CHLORIDE, PRESERVATIVE FREE 10 ML: 5 INJECTION INTRAVENOUS at 20:14

## 2021-07-24 RX ADMIN — RAMIPRIL 20 MG: 10 CAPSULE ORAL at 20:13

## 2021-07-24 RX ADMIN — AMIODARONE HYDROCHLORIDE 200 MG: 200 TABLET ORAL at 20:13

## 2021-07-24 RX ADMIN — APIXABAN 5 MG: 5 TABLET, FILM COATED ORAL at 20:27

## 2021-07-24 RX ADMIN — Medication 400 MG: at 20:13

## 2021-07-24 RX ADMIN — ISOSORBIDE MONONITRATE 30 MG: 30 TABLET ORAL at 20:14

## 2021-07-24 RX ADMIN — ATORVASTATIN CALCIUM 40 MG: 40 TABLET, FILM COATED ORAL at 20:13

## 2021-07-24 RX ADMIN — CYCLOBENZAPRINE 10 MG: 10 TABLET, FILM COATED ORAL at 20:13

## 2021-07-24 RX ADMIN — POTASSIUM CHLORIDE 40 MEQ: 1500 TABLET, EXTENDED RELEASE ORAL at 20:12

## 2021-07-24 RX ADMIN — CEFTRIAXONE SODIUM 1000 MG: 1 INJECTION, POWDER, FOR SOLUTION INTRAMUSCULAR; INTRAVENOUS at 20:15

## 2021-07-24 RX ADMIN — HYDROCODONE BITARTRATE AND ACETAMINOPHEN 1 TABLET: 5; 325 TABLET ORAL at 17:41

## 2021-07-24 RX ADMIN — LABETALOL HYDROCHLORIDE 100 MG: 100 TABLET, FILM COATED ORAL at 20:13

## 2021-07-24 RX ADMIN — AMLODIPINE BESYLATE 5 MG: 5 TABLET ORAL at 20:14

## 2021-07-24 RX ADMIN — HYDROCODONE BITARTRATE AND ACETAMINOPHEN 1 TABLET: 5; 325 TABLET ORAL at 23:40

## 2021-07-24 RX ADMIN — METOPROLOL TARTRATE 75 MG: 25 TABLET, FILM COATED ORAL at 20:13

## 2021-07-24 ASSESSMENT — ENCOUNTER SYMPTOMS
EYES NEGATIVE: 1
ALLERGIC/IMMUNOLOGIC NEGATIVE: 1
NAUSEA: 0
DIARRHEA: 0
RESPIRATORY NEGATIVE: 1
BACK PAIN: 1
CONSTIPATION: 0
VOMITING: 0
NAUSEA: 1
SHORTNESS OF BREATH: 0
ABDOMINAL PAIN: 0

## 2021-07-24 ASSESSMENT — PAIN SCALES - GENERAL
PAINLEVEL_OUTOF10: 5
PAINLEVEL_OUTOF10: 7
PAINLEVEL_OUTOF10: 7

## 2021-07-24 ASSESSMENT — PAIN DESCRIPTION - ORIENTATION: ORIENTATION: RIGHT;LEFT

## 2021-07-24 ASSESSMENT — PAIN DESCRIPTION - DESCRIPTORS: DESCRIPTORS: ACHING;BURNING

## 2021-07-24 ASSESSMENT — PAIN DESCRIPTION - FREQUENCY: FREQUENCY: CONTINUOUS

## 2021-07-24 ASSESSMENT — PAIN DESCRIPTION - PAIN TYPE: TYPE: CHRONIC PAIN

## 2021-07-24 ASSESSMENT — PAIN - FUNCTIONAL ASSESSMENT: PAIN_FUNCTIONAL_ASSESSMENT: PREVENTS OR INTERFERES SOME ACTIVE ACTIVITIES AND ADLS

## 2021-07-24 ASSESSMENT — PAIN DESCRIPTION - PROGRESSION: CLINICAL_PROGRESSION: NOT CHANGED

## 2021-07-24 ASSESSMENT — PAIN DESCRIPTION - ONSET: ONSET: ON-GOING

## 2021-07-24 ASSESSMENT — PAIN DESCRIPTION - LOCATION: LOCATION: HIP;LEG

## 2021-07-24 NOTE — ED NOTES
Patient pulled up in bed at this time and repositioned for comfort. Patient respirations are regular and unlabored. Patient appears to be in no current distress. Patient VSS. Call light is within reach. Patient expresses no needs at this time.        Jaren Monzon RN  07/24/21 2459

## 2021-07-24 NOTE — ED PROVIDER NOTES
[address] [address]          Pt Name: Nyla Delgado  MRN: [id number]  Birthdate 1940  Date of evaluation: 7/24/2021  Treating Resident Physician: Eitan Patton MD  Supervising Physician: Salma Hawkins MD    [address] [place] [address]       Chief Complaint   Patient presents with    Fatigue     History obtained from the patient. HISTORY OF PRESENT ILLNESS    HPI  Nyla Delgado is a [de-identified] y.o. female who presents to the emergency department for evaluation of bilateral leg weakness. The patient states after her morning routine yesterday morning she noticed increased weakness when walking and is now unable to walk with her walker. She underwent low back surgery a month ago and states that since the surgery she has been having trouble walking and has had to ambulate using a walker. She also reports new paresthesias bilaterally in both lower extremities. She denies chest pain, shortness of breath, diaphoresis, diarrhea, or constipation. The patient has no other acute complaints at this time. REVIEW OF SYSTEMS   Review of Systems   Respiratory: Negative for shortness of breath. Cardiovascular: Negative for chest pain and leg swelling. Gastrointestinal: Negative for abdominal pain, constipation, diarrhea, nausea and vomiting. Musculoskeletal: Negative for neck pain. Neurological: Positive for weakness. Negative for dizziness and headaches. PAST MEDICAL AND SURGICAL HISTORY     Past Medical History:   Diagnosis Date    Anxiety     Atrial fibrillation [facility])     Cardiac murmur     Hypercholesterolemia     no medication recommended - just dietary recommendations    Hypertension     130's/80-90's at home, white coat hypertension, stress test 2007 prior to TKA at [facility] Microscopic hematuria     Obesity (BMI 30-39. 9)     Snoring     no apnea, no waking coughing or choking, no am headache, BMI 36, neck circ.  15 inches    Type II or unspecified type diabetes mellitus without mention of complication, not stated as uncontrolled     FSBS       Past Surgical History:   Procedure Laterality Date    JOINT REPLACEMENT Left 2013    left hip replacement--Dr Ellison    KNEE ARTHROPLASTY Bilateral     left 2006 right 2007    ORIF DISTAL RADIUS FRACTURE Right 7/30/2019    ORIF RIGHT DISTAL RADIUS performed by Josseline Apple MD at [address]     Current Facility-Administered Medications:     cefTRIAXone (ROCEPHIN) 1000 mg IVPB in 50 mL D5W minibag, 1,000 mg, Intravenous, Q24H, Madeline Richey MD    Current Outpatient Medications:     cyclobenzaprine (FLEXERIL) 10 MG tablet, cyclobenzaprine Cyclobenzaprine Active 10 MG PO Twice Daily as needed 12 April 8th, 2021 8:40pm 04-  [address] ([id number]), Disp: , Rfl:     atorvastatin (LIPITOR) 40 MG tablet, Take 1 tablet by mouth daily, Disp: 90 tablet, Rfl: 1    ramipril (ALTACE) 10 MG capsule, Take 20 mg by mouth daily , Disp: , Rfl:     amiodarone (CORDARONE) 200 MG tablet, Take 1 tablet by mouth daily, Disp: , Rfl:     metFORMIN (GLUCOPHAGE) 1000 MG tablet, Take 1 tablet by mouth 2 times daily, Disp: 180 tablet, Rfl: 3    labetalol (NORMODYNE) 100 MG tablet, Take 100 mg by mouth 2 times daily, Disp: , Rfl:     furosemide (LASIX) 40 MG tablet, Take 40 mg by mouth See Admin Instructions Qd on M,W,F, Disp: , Rfl:     magnesium oxide (MAG-OX) 400 MG tablet, Take 400 mg by mouth daily, Disp: , Rfl:     potassium chloride (MICRO-K) 10 MEQ extended release capsule, Take 10 mEq by mouth See Admin Instructions Qd on M,W,F, Disp: , Rfl:     isosorbide mononitrate (IMDUR) 30 MG extended release tablet, Take 30 mg by mouth daily, Disp: , Rfl:     amLODIPine (NORVASC) 5 MG tablet, Take 1 tablet by mouth daily, Disp: , Rfl:     LANTUS SOLOSTAR 100 UNIT/ML injection pen, Inject 30 Units into the skin daily, Disp: 15 pen, Rfl: 3    apixaban (ELIQUIS) 5 MG TABS tablet, Take 1 tablet by mouth 2 times daily, Disp: 60 tablet, Rfl: 5    metoprolol tartrate (LOPRESSOR) 25 MG tablet, Take 3 tablets by mouth 2 times daily, Disp: 180 tablet, Rfl: 5    Insulin Pen Needle 31G X 8 MM MISC, 1 each by Does not apply route daily, Disp: 100 each, Rfl: 3    Omega-3 Fatty Acids (FISH OIL) 1000 MG CAPS, Take 3,000 mg by mouth 2 times daily, Disp: , Rfl:     Multiple Vitamins-Minerals (OCUVITE PRESERVISION PO), Take by mouth daily, Disp: , Rfl:     Multiple Vitamins-Minerals (THERAPEUTIC MULTIVITAMIN-MINERALS) tablet, Take 1 tablet by mouth daily. , Disp: , Rfl:       SOCIAL HISTORY     Social History     Social History Narrative    Not on file     Social History     Tobacco Use    Smoking status: Never Smoker    Smokeless tobacco: Never Used   Vaping Use    Vaping Use: Never used   Substance Use Topics    Alcohol use: Never    Drug use: No         ALLERGIES     Allergies   Allergen Reactions    Trimethoprim Hives    Sulfa Antibiotics Hives         FAMILY HISTORY     Family History   Problem Relation Age of Onset    Heart Disease Mother 79        CABG, MI    Stroke Mother     Heart Disease Father     Cancer Father         lung    Arthritis Father     Cancer Sister         brain tumor    Arthritis Brother     Arthritis Sister          PREVIOUS RECORDS   Previous records reviewed. PHYSICAL EXAM     ED Triage Vitals [07/24/21 1252]   BP Temp Temp Source Pulse Resp SpO2 Height Weight   (!) 153/76 97.9 °F (36.6 °C) Oral 85 12 98 % 5' 4\" (1.626 m) 171 lb (77.6 kg)     Initial vital signs and nursing assessment reviewed and abnormal from Hypertension. Body mass index is 29.35 kg/m². Pulsoximetry is normal per my interpretation. Additional Vital Signs:  Vitals:    07/24/21 1603   BP:    Pulse: 73   Resp: 16   Temp:    SpO2: 98%       Physical Exam  HENT:      Head: Normocephalic and atraumatic.    Eyes:      Conjunctiva/sclera: Conjunctivae normal.      Pupils: Pupils are equal, round, and reactive to light. Comments: Left beating nystagmus when looking to the right. Cardiovascular:      Rate and Rhythm: Normal rate and regular rhythm. Heart sounds: Murmur heard. Crescendo decrescendo systolic murmur is present with a grade of 2/6. Pulmonary:      Effort: Pulmonary effort is normal.      Breath sounds: Normal breath sounds and air entry. Abdominal:      Tenderness: There is abdominal tenderness in the right upper quadrant, epigastric area, periumbilical area and left upper quadrant. Hernia: No hernia is present. Musculoskeletal:      Cervical back: Normal, normal range of motion and neck supple. Thoracic back: Normal.      Lumbar back: No bony tenderness. Right lower leg: No edema. Left lower leg: No edema. Skin:         Neurological:      Mental Status: She is alert and oriented to person, place, and time. Cranial Nerves: Cranial nerves are intact. Sensory: Sensation is intact. Comments: Strength +2 in LLE, +2 in RLE, +2 LUE, +2 RUE             MEDICAL DECISION MAKING   Initial Assessment:   1. General weakness.   Plan:    Bilateral lower extremity weakness   Unable to ambulate   Unable to take care of ADLs   Caretaker no longer able to care for patient   We will admit to hospitalist.        ED RESULTS   Laboratory results:  Labs Reviewed   BASIC METABOLIC PANEL W/ REFLEX TO MG FOR LOW K - Abnormal; Notable for the following components:       Result Value    Potassium reflex Magnesium 3.2 (*)     Chloride 97 (*)     Glucose 133 (*)     All other components within normal limits   CBC WITH AUTO DIFFERENTIAL - Abnormal; Notable for the following components:    RBC 3.80 (*)     Hemoglobin 10.0 (*)     Hematocrit 33.4 (*)     MCHC 29.9 (*)     RDW-CV 16.1 (*)     RDW-SD 51.9 (*)     MPV 8.8 (*)     All other components within normal limits   OSMOLALITY - Abnormal; Notable for the following components:    Osmolality Calc 272.9 (*)     All other components within normal limits   TROPONIN   ANION GAP   GLOMERULAR FILTRATION RATE, ESTIMATED   MAGNESIUM       Radiologic studies results:  No orders to display       ED Medications administered this visit:   Medications   cefTRIAXone (ROCEPHIN) 1000 mg IVPB in 50 mL D5W minibag (has no administration in time range)         ED COURSE     ED Course as of Jul 24 1712   Sat Jul 24, 2021   1513 Spoke to patients daughter, Prosper Heath, who is the main caretaker of the patient. Daughter states that the pt would have no social support at home. Daughter recently broke her hip and is no longer able to care for the pt. [JT]   1534 Pt unable to ambulate with walker    [JT]   1553 Spoke with admitting hospitalist [facility], will admit overnight for observation. [JT]      ED Course User Index  [JT] Mohsen Montero MD       Strict return precautions and follow up instructions were discussed with the patient prior to discharge, with which the patient agrees. MEDICATION CHANGES     New Prescriptions    No medications on file         FINAL DISPOSITION     Final diagnoses:   General weakness     Condition: condition: stable  Dispo: Admit to med/surg    This transcription was electronically signed. Parts of this transcriptions may have been dictated by use of voice recognition software and electronically transcribed, and parts may have been transcribed with the assistance of an ED scribe. The transcription may contain errors not detected in proofreading. Please refer to my supervising physician's documentation if my documentation differs.     Electronically Signed: Mohsen Montero MD, 07/24/21, 5:12 PM          Mohsen Montero MD  Resident  07/24/21 9685

## 2021-07-24 NOTE — ACP (ADVANCE CARE PLANNING)
Advance Care Planning     Advance Care Planning Activator (Inpatient)  Conversation Note      Date of ACP Conversation: 7/24/2021     Conversation Conducted with: Patient with Decision Making Capacity    ACP Activator: Sam Castanon RN      Health Care Decision Maker:     Current Designated Health Care Decision Maker:     Primary Decision Maker: Jaimee Batters - Child    Secondary Decision Maker: Divya Pratt - Brother/Sister - [phone]      Care Preferences    Ventilation: \"If you were in your present state of health and suddenly became very ill and were unable to breathe on your own, what would your preference be about the use of a ventilator (breathing machine) if it were available to you? \"      Would the patient desire the use of ventilator (breathing machine)?: no    \"If your health worsens and it becomes clear that your chance of recovery is unlikely, what would your preference be about the use of a ventilator (breathing machine) if it were available to you? \"     Would the patient desire the use of ventilator (breathing machine)?: No      Resuscitation  \"CPR works best to restart the heart when there is a sudden event, like a heart attack, in someone who is otherwise healthy. Unfortunately, CPR does not typically restart the heart for people who have serious health conditions or who are very sick. \"    \"In the event your heart stopped as a result of an underlying serious health condition, would you want attempts to be made to restart your heart (answer \"yes\" for attempt to resuscitate) or would you prefer a natural death (answer \"no\" for do not attempt to resuscitate)? \" no       [] Yes   [x] No   Educated Patient / Lizet Waylon regarding differences between Advance Directives and portable DNR orders.     Length of ACP Conversation in minutes:  10  Conversation Outcomes:  [x] ACP discussion completed  [] Existing advance directive reviewed with patient; no changes to patient's previously recorded wishes  [] New Advance Directive completed  [] Portable Do Not Rescitate prepared for Provider review and signature  [] POLST/POST/MOLST/MOST prepared for Provider review and signature      Follow-up plan:    [] Schedule follow-up conversation to continue planning  [x] Referred individual to Provider for additional questions/concerns   [] Advised patient/agent/surrogate to review completed ACP document and update if needed with changes in condition, patient preferences or care setting    [] This note routed to one or more involved healthcare providers         Pt came into ED due to fatigue. Met with pt to discuss ACP. She states that she has AD and does not want CPR or vent. States spiritually \"good\". Denies any needs/concerns.

## 2021-07-24 NOTE — H&P
HISTORY AND PHYSICAL             Date: 7/24/2021        Patient Name: John Cisse     YOB: 1940      Age:  [de-identified] y.o. Chief Complaint     Chief Complaint   Patient presents with    Fatigue       History Obtained From   patient    History of Present Illness   John Cisse is a [de-identified] y.o. female who presents to the emergency department for evaluation of bilateral leg weakness. The patient states after her morning routine yesterday morning she noticed increased weakness when walking and is now unable to walk with her walker. She underwent low back surgery a month ago and states that since the surgery she has been having trouble walking and has had to ambulate using a walker. She also reports new paresthesias bilaterally in both lower extremities. She denies chest pain, shortness of breath, diaphoresis, diarrhea, or constipation. The patient has no other acute complaints at this time. MRI lumbar spine 2 weeks agp showed discitis and   osteomyelitis at L2-L3, laminectomy L2-3 with possible abscess posteriorly external to the   spinal canal, no insignificant interval changes noted overall. Dr Sapphire Panda and DR Miguel Ángel Justin   consulted who both agree patient does not need any surgical intervention at this time. Patient was to discharge previousy on 6 wks of antiobiotic however this was not done at   [facility]. Patient has PICC. Patient is feeling much improved, labs stable, afebrile. Patient   working with therapy. Dr Miguel Ángel Justin wrote for Rx for Ceftriaxone and this will be continued for   6 wks. Patient stable for d/c to SNF,        Past Medical History     Past Medical History:   Diagnosis Date    Anxiety     Atrial fibrillation ([address]     Cardiac murmur     Hypercholesterolemia     no medication recommended - just dietary recommendations    Hypertension     130's/80-90's at home, white coat hypertension, stress test 2007 prior to TKA at [facility] Microscopic hematuria     Obesity (BMI 30-39. 9)     4/13/20   Historical Provider, MD   LANTUS SOLOSTAR 100 UNIT/ML injection pen Inject 30 Units into the skin daily 3/11/21   Jaydon Clement MD   apixaban (ELIQUIS) 5 MG TABS tablet Take 1 tablet by mouth 2 times daily 9/30/20   Jaydon Clement MD   metoprolol tartrate (LOPRESSOR) 25 MG tablet Take 3 tablets by mouth 2 times daily 3/23/20   Jaydon Clement MD   Insulin Pen Needle 31G X 8 MM MISC 1 each by Does not apply route daily 3/27/19   Jaydon Clement MD   Omega-3 Fatty Acids (FISH OIL) 1000 MG CAPS Take 3,000 mg by mouth 2 times daily    Historical Provider, MD   Multiple Vitamins-Minerals (OCUVITE PRESERVISION PO) Take by mouth daily    Historical Provider, MD   Multiple Vitamins-Minerals (THERAPEUTIC MULTIVITAMIN-MINERALS) tablet Take 1 tablet by mouth daily. Historical Provider, MD        Allergies   Trimethoprim and Sulfa antibiotics    Social History     Social History     Tobacco History     Smoking Status  Never Smoker    Smokeless Tobacco Use  Never Used          Alcohol History     Alcohol Use Status  Never          Drug Use     Drug Use Status  No          Sexual Activity     Sexually Active  Not Asked                Family History     Family History   Problem Relation Age of Onset    Heart Disease Mother 79        [place]AB[place], [place]    Stroke Mother     Heart Disease Father     Cancer Father         lung    Arthritis Father     Cancer Sister         brain tumor    Arthritis Brother     Arthritis Sister        Review of Systems   Review of Systems   Constitutional: Positive for fatigue. HENT: Negative. Eyes: Negative. Respiratory: Negative. Cardiovascular: Negative. Gastrointestinal: Positive for nausea. Endocrine: Negative. Genitourinary: Negative. Musculoskeletal: Positive for back pain. Skin: Negative. Allergic/Immunologic: Negative. Neurological: Positive for weakness. Hematological: Negative. Psychiatric/Behavioral: Negative.         Physical Exam   BP (!) 155/97   Pulse 73   Temp 97.9 °F (36.6 °C) (Oral)   Resp 16   Ht 5' 4\" (1.626 m)   Wt 171 lb (77.6 kg)   SpO2 98%   BMI 29.35 kg/m²     Physical Exam  Vitals reviewed. Constitutional:       Appearance: Normal appearance. She is normal weight. HENT:      Head: Normocephalic and atraumatic. Right Ear: External ear normal.      Left Ear: External ear normal.      Nose: Nose normal.      Mouth/Throat:      Mouth: Mucous membranes are moist.      Pharynx: Oropharynx is clear. Eyes:      Extraocular Movements: Extraocular movements intact. Conjunctiva/sclera: Conjunctivae normal.      Pupils: Pupils are equal, round, and reactive to light. Cardiovascular:      Rate and Rhythm: Normal rate. Pulmonary:      Effort: Pulmonary effort is normal.   Abdominal:      General: Bowel sounds are normal.      Palpations: Abdomen is soft. Musculoskeletal:         General: Normal range of motion. Cervical back: Normal range of motion. Skin:     General: Skin is warm. Capillary Refill: Capillary refill takes less than 2 seconds. Neurological:      Mental Status: She is alert.       Comments: Gait not tested    Psychiatric:         Mood and Affect: Mood normal.         Labs      Recent Results (from the past 24 hour(s))   EKG 12 Lead    Collection Time: 07/24/21 12:59 PM   Result Value Ref Range    Ventricular Rate 81 BPM    Atrial Rate 81 BPM    P-R Interval 194 ms    QRS Duration 90 ms    Q-T Interval 424 ms    QTc Calculation (Bazett) 492 ms    P Axis 31 degrees    R Axis -30 degrees    T Axis 111 degrees   Basic Metabolic Panel w/ Reflex to MG    Collection Time: 07/24/21  2:14 PM   Result Value Ref Range    Sodium 136 135 - 145 meq/L    Potassium reflex Magnesium 3.2 (L) 3.5 - 5.2 meq/L    Chloride 97 (L) 98 - 111 meq/L    CO2 27 23 - 33 meq/L    Glucose 133 (H) 70 - 108 mg/dL    BUN 10 7 - 22 mg/dL    CREATININE 0.4 0.4 - 1.2 mg/dL    Calcium 9.6 8.5 - 10.5 mg/dL   CBC Auto Differential Osteomyelitis of L2-L3, status post laminectomy, was on ceftriaxone will continue the same and consult Dr. Oralia Vasquez and ID consider Rpt MRI of lumbar spine   4. History of atrial fibrillation currently rate controlled continue home medications along with Eliquis  5. Type 2 diabetes continue Lantus 30 units at night and insulin to scale per hospital protocol  6. Hypertension under control continue home medications  7. Hyperlipidemia continue statins  8. Hypokalemia will replace and repeat BMP  in the morning  9. CHF with preserved LV ejection fraction/diastolic dysfunction stage II, continue Lasix repeat echo  10.   Cardiac murmur, moderate tricuspid regurgitation with mild aortic stenosis continue to monitor and repeat echo       Consultations Ordered:  IP CONSULT TO INFECTIOUS DISEASES    Electronically signed by Robert Ramos MD on 7/24/21 at 4:57 PM EDT

## 2021-07-24 NOTE — ED NOTES
Pt to the ED via EMS. Patient presents with complaints of weakness. Patient states that she just got home from the nursing home, patient was doing rehab at [facility] and was able to walk. Patient states that she got home from the [facility] and cannot walk anymore and feels weak. EKG done. Patient is alert and oriented x 4. Respirations are regular and unlabored. Patient provided blanket. Call light within reach.      Carlie Edward RN  07/24/21 1257

## 2021-07-24 NOTE — ED NOTES
ED to inpatient nurses report    Chief Complaint   Patient presents with    Fatigue      Present to ED from home  LOC: alert and orientated to name, place, date  Vital signs   Vitals:    07/24/21 1252 07/24/21 1354 07/24/21 1452 07/24/21 1603   BP: (!) 153/76 (!) 155/97     Pulse: 85 80 82 73   Resp: 12 22 22 16   Temp: 97.9 °F (36.6 °C)      TempSrc: Oral      SpO2: 98% 98% 98% 98%   Weight: 171 lb (77.6 kg)      Height: 5' 4\" (1.626 m)         Oxygen Baseline RA    Current needs required RA Bipap/Cpap No  LDAs:   Peripheral IV 04/13/20 Left Hand (Active)     Mobility: Requires assistance * 2  Pending ED orders: Complete  Present condition: Stable  Person of contract Afia, phone number in chart  Our promise was given to patient    Electronically signed by Lona Ross RN on 7/24/2021 at 5:17 PM       [place]  07/24/21 2033

## 2021-07-24 NOTE — ED NOTES
Patient lying in bed and updated on admission status at this time. Patient respirations are regular and unlabored. Patient appears to be in no current distress. Patient VSS. Call light is within reach. Patient expresses no needs at this time.        Ivan Matthews RN  07/24/21 9501

## 2021-07-24 NOTE — ED NOTES
Patient providing registration with information, this RN called to room in order to provide daughter phone number from EMS. Patient respirations are regular and unlabored. Patient appears to be in no current distress. Patient VSS. Call light is within reach. Patient expresses no needs at this time.        Katya Moody RN  07/24/21 0633

## 2021-07-25 ENCOUNTER — APPOINTMENT (OUTPATIENT)
Dept: GENERAL RADIOLOGY | Age: 81
DRG: 884 | End: 2021-07-25
Payer: MEDICARE

## 2021-07-25 ENCOUNTER — APPOINTMENT (OUTPATIENT)
Dept: MRI IMAGING | Age: 81
DRG: 884 | End: 2021-07-25
Payer: MEDICARE

## 2021-07-25 LAB
ALBUMIN SERPL-MCNC: 3.8 G/DL (ref 3.5–5.1)
ALP BLD-CCNC: 104 U/L (ref 38–126)
ALT SERPL-CCNC: 6 U/L (ref 11–66)
ANION GAP SERPL CALCULATED.3IONS-SCNC: 11 MEQ/L (ref 8–16)
AST SERPL-CCNC: 10 U/L (ref 5–40)
BILIRUB SERPL-MCNC: 0.3 MG/DL (ref 0.3–1.2)
BUN BLDV-MCNC: 10 MG/DL (ref 7–22)
CALCIUM SERPL-MCNC: 9.4 MG/DL (ref 8.5–10.5)
CHLORIDE BLD-SCNC: 99 MEQ/L (ref 98–111)
CO2: 27 MEQ/L (ref 23–33)
CREAT SERPL-MCNC: 0.5 MG/DL (ref 0.4–1.2)
ERYTHROCYTE [DISTWIDTH] IN BLOOD BY AUTOMATED COUNT: 16.3 % (ref 11.5–14.5)
ERYTHROCYTE [DISTWIDTH] IN BLOOD BY AUTOMATED COUNT: 53.5 FL (ref 35–45)
GFR SERPL CREATININE-BSD FRML MDRD: > 90 ML/MIN/1.73M2
GLUCOSE BLD-MCNC: 120 MG/DL (ref 70–108)
GLUCOSE BLD-MCNC: 134 MG/DL (ref 70–108)
GLUCOSE BLD-MCNC: 153 MG/DL (ref 70–108)
GLUCOSE BLD-MCNC: 197 MG/DL (ref 70–108)
GLUCOSE BLD-MCNC: 222 MG/DL (ref 70–108)
HCT VFR BLD CALC: 30.9 % (ref 37–47)
HEMOGLOBIN: 9.1 GM/DL (ref 12–16)
MCH RBC QN AUTO: 26.5 PG (ref 26–33)
MCHC RBC AUTO-ENTMCNC: 29.4 GM/DL (ref 32.2–35.5)
MCV RBC AUTO: 89.8 FL (ref 81–99)
PLATELET # BLD: 352 THOU/MM3 (ref 130–400)
PMV BLD AUTO: 9.1 FL (ref 9.4–12.4)
POTASSIUM REFLEX MAGNESIUM: 3.7 MEQ/L (ref 3.5–5.2)
RBC # BLD: 3.44 MILL/MM3 (ref 4.2–5.4)
SODIUM BLD-SCNC: 137 MEQ/L (ref 135–145)
TOTAL PROTEIN: 6 G/DL (ref 6.1–8)
WBC # BLD: 5.2 THOU/MM3 (ref 4.8–10.8)

## 2021-07-25 PROCEDURE — 6360000004 HC RX CONTRAST MEDICATION: Performed by: NEUROLOGICAL SURGERY

## 2021-07-25 PROCEDURE — 6360000002 HC RX W HCPCS: Performed by: NURSE PRACTITIONER

## 2021-07-25 PROCEDURE — 2580000003 HC RX 258: Performed by: INTERNAL MEDICINE

## 2021-07-25 PROCEDURE — G0378 HOSPITAL OBSERVATION PER HR: HCPCS

## 2021-07-25 PROCEDURE — 85027 COMPLETE CBC AUTOMATED: CPT

## 2021-07-25 PROCEDURE — 36415 COLL VENOUS BLD VENIPUNCTURE: CPT

## 2021-07-25 PROCEDURE — 80053 COMPREHEN METABOLIC PANEL: CPT

## 2021-07-25 PROCEDURE — 36593 DECLOT VASCULAR DEVICE: CPT

## 2021-07-25 PROCEDURE — 6370000000 HC RX 637 (ALT 250 FOR IP): Performed by: NURSE PRACTITIONER

## 2021-07-25 PROCEDURE — 6360000002 HC RX W HCPCS: Performed by: INTERNAL MEDICINE

## 2021-07-25 PROCEDURE — 72158 MRI LUMBAR SPINE W/O & W/DYE: CPT

## 2021-07-25 PROCEDURE — A9579 GAD-BASE MR CONTRAST NOS,1ML: HCPCS | Performed by: NEUROLOGICAL SURGERY

## 2021-07-25 PROCEDURE — 99225 PR SBSQ OBSERVATION CARE/DAY 25 MINUTES: CPT | Performed by: NURSE PRACTITIONER

## 2021-07-25 PROCEDURE — 6370000000 HC RX 637 (ALT 250 FOR IP): Performed by: INTERNAL MEDICINE

## 2021-07-25 PROCEDURE — 82948 REAGENT STRIP/BLOOD GLUCOSE: CPT

## 2021-07-25 PROCEDURE — 71045 X-RAY EXAM CHEST 1 VIEW: CPT

## 2021-07-25 RX ORDER — DEXTROSE MONOHYDRATE 50 MG/ML
100 INJECTION, SOLUTION INTRAVENOUS PRN
Status: DISCONTINUED | OUTPATIENT
Start: 2021-07-25 | End: 2021-08-11 | Stop reason: HOSPADM

## 2021-07-25 RX ORDER — BUSPIRONE HYDROCHLORIDE 5 MG/1
5 TABLET ORAL 2 TIMES DAILY
Status: DISCONTINUED | OUTPATIENT
Start: 2021-07-25 | End: 2021-08-11 | Stop reason: HOSPADM

## 2021-07-25 RX ORDER — GABAPENTIN 100 MG/1
100 CAPSULE ORAL 3 TIMES DAILY
Status: DISCONTINUED | OUTPATIENT
Start: 2021-07-25 | End: 2021-08-11 | Stop reason: HOSPADM

## 2021-07-25 RX ORDER — NICOTINE POLACRILEX 4 MG
15 LOZENGE BUCCAL PRN
Status: DISCONTINUED | OUTPATIENT
Start: 2021-07-25 | End: 2021-08-11 | Stop reason: HOSPADM

## 2021-07-25 RX ORDER — PANTOPRAZOLE SODIUM 40 MG/1
40 TABLET, DELAYED RELEASE ORAL
Status: DISCONTINUED | OUTPATIENT
Start: 2021-07-26 | End: 2021-08-11 | Stop reason: HOSPADM

## 2021-07-25 RX ORDER — DOCUSATE SODIUM 100 MG/1
100 CAPSULE, LIQUID FILLED ORAL 2 TIMES DAILY
Status: DISCONTINUED | OUTPATIENT
Start: 2021-07-25 | End: 2021-08-11 | Stop reason: HOSPADM

## 2021-07-25 RX ORDER — CITALOPRAM 20 MG/1
10 TABLET ORAL DAILY
Status: DISCONTINUED | OUTPATIENT
Start: 2021-07-25 | End: 2021-08-11 | Stop reason: HOSPADM

## 2021-07-25 RX ORDER — DEXTROSE MONOHYDRATE 25 G/50ML
12.5 INJECTION, SOLUTION INTRAVENOUS PRN
Status: DISCONTINUED | OUTPATIENT
Start: 2021-07-25 | End: 2021-08-11 | Stop reason: HOSPADM

## 2021-07-25 RX ADMIN — CITALOPRAM 10 MG: 20 TABLET, FILM COATED ORAL at 21:36

## 2021-07-25 RX ADMIN — ALTEPLASE 1 MG: 2.2 INJECTION, POWDER, LYOPHILIZED, FOR SOLUTION INTRAVENOUS at 18:12

## 2021-07-25 RX ADMIN — ATORVASTATIN CALCIUM 40 MG: 40 TABLET, FILM COATED ORAL at 21:38

## 2021-07-25 RX ADMIN — HYDROCODONE BITARTRATE AND ACETAMINOPHEN 1 TABLET: 5; 325 TABLET ORAL at 11:14

## 2021-07-25 RX ADMIN — AMLODIPINE BESYLATE 5 MG: 5 TABLET ORAL at 08:47

## 2021-07-25 RX ADMIN — ISOSORBIDE MONONITRATE 30 MG: 30 TABLET ORAL at 08:47

## 2021-07-25 RX ADMIN — RAMIPRIL 20 MG: 10 CAPSULE ORAL at 08:46

## 2021-07-25 RX ADMIN — SODIUM CHLORIDE, PRESERVATIVE FREE 10 ML: 5 INJECTION INTRAVENOUS at 21:49

## 2021-07-25 RX ADMIN — LABETALOL HYDROCHLORIDE 100 MG: 100 TABLET, FILM COATED ORAL at 21:37

## 2021-07-25 RX ADMIN — HYDROCODONE BITARTRATE AND ACETAMINOPHEN 1 TABLET: 5; 325 TABLET ORAL at 23:35

## 2021-07-25 RX ADMIN — FUROSEMIDE 20 MG: 40 TABLET ORAL at 08:46

## 2021-07-25 RX ADMIN — HYDROCODONE BITARTRATE AND ACETAMINOPHEN 1 TABLET: 5; 325 TABLET ORAL at 16:10

## 2021-07-25 RX ADMIN — SODIUM CHLORIDE, PRESERVATIVE FREE 10 ML: 5 INJECTION INTRAVENOUS at 08:50

## 2021-07-25 RX ADMIN — CYCLOBENZAPRINE 10 MG: 10 TABLET, FILM COATED ORAL at 08:47

## 2021-07-25 RX ADMIN — GABAPENTIN 100 MG: 100 CAPSULE ORAL at 21:36

## 2021-07-25 RX ADMIN — LABETALOL HYDROCHLORIDE 100 MG: 100 TABLET, FILM COATED ORAL at 08:47

## 2021-07-25 RX ADMIN — METOPROLOL TARTRATE 75 MG: 25 TABLET, FILM COATED ORAL at 21:37

## 2021-07-25 RX ADMIN — Medication 400 MG: at 08:47

## 2021-07-25 RX ADMIN — CEFTRIAXONE SODIUM 1000 MG: 1 INJECTION, POWDER, FOR SOLUTION INTRAMUSCULAR; INTRAVENOUS at 21:37

## 2021-07-25 RX ADMIN — AMIODARONE HYDROCHLORIDE 200 MG: 200 TABLET ORAL at 08:47

## 2021-07-25 RX ADMIN — METOPROLOL TARTRATE 75 MG: 25 TABLET, FILM COATED ORAL at 08:49

## 2021-07-25 RX ADMIN — BUSPIRONE HYDROCHLORIDE 5 MG: 5 TABLET ORAL at 21:36

## 2021-07-25 RX ADMIN — INSULIN GLARGINE 30 UNITS: 100 INJECTION, SOLUTION SUBCUTANEOUS at 21:46

## 2021-07-25 RX ADMIN — ONDANSETRON 4 MG: 4 TABLET, ORALLY DISINTEGRATING ORAL at 08:57

## 2021-07-25 RX ADMIN — APIXABAN 5 MG: 5 TABLET, FILM COATED ORAL at 21:37

## 2021-07-25 RX ADMIN — GADOTERIDOL 15 ML: 279.3 INJECTION, SOLUTION INTRAVENOUS at 10:21

## 2021-07-25 RX ADMIN — APIXABAN 5 MG: 5 TABLET, FILM COATED ORAL at 08:47

## 2021-07-25 RX ADMIN — CYCLOBENZAPRINE 10 MG: 10 TABLET, FILM COATED ORAL at 21:45

## 2021-07-25 RX ADMIN — ALTEPLASE 1 MG: 2.2 INJECTION, POWDER, LYOPHILIZED, FOR SOLUTION INTRAVENOUS at 14:28

## 2021-07-25 ASSESSMENT — PAIN SCALES - GENERAL
PAINLEVEL_OUTOF10: 7
PAINLEVEL_OUTOF10: 7
PAINLEVEL_OUTOF10: 8
PAINLEVEL_OUTOF10: 0
PAINLEVEL_OUTOF10: 0

## 2021-07-25 ASSESSMENT — PAIN DESCRIPTION - DESCRIPTORS: DESCRIPTORS: BURNING;JABBING

## 2021-07-25 ASSESSMENT — PAIN DESCRIPTION - FREQUENCY: FREQUENCY: CONTINUOUS

## 2021-07-25 ASSESSMENT — PAIN DESCRIPTION - PROGRESSION: CLINICAL_PROGRESSION: GRADUALLY IMPROVING

## 2021-07-25 ASSESSMENT — PAIN DESCRIPTION - LOCATION: LOCATION: LEG

## 2021-07-25 ASSESSMENT — PAIN - FUNCTIONAL ASSESSMENT: PAIN_FUNCTIONAL_ASSESSMENT: PREVENTS OR INTERFERES SOME ACTIVE ACTIVITIES AND ADLS

## 2021-07-25 ASSESSMENT — PAIN DESCRIPTION - ONSET: ONSET: ON-GOING

## 2021-07-25 ASSESSMENT — PAIN DESCRIPTION - PAIN TYPE: TYPE: ACUTE PAIN

## 2021-07-25 ASSESSMENT — PAIN DESCRIPTION - ORIENTATION: ORIENTATION: RIGHT

## 2021-07-25 NOTE — PROGRESS NOTES
Hospitalist Progress Note    Patient:  John Cisse      Unit/Bed:7K-05/005-A    YOB: 1940    MRN: [id number]       Acct: [de-identified]     PCP: Charline Gomes MD    Date of Admission: 7/24/2021    Assessment/Plan:    1. Deconditioning, debility and generalized weakness following surgery with complication. Recently discharged from [facility]. Unable to care for herself at home. Will need placement back. SW/CM consulted. PT/OT. 2. Hypokalemia, resolved with replacement. 3. Osteomyelitis L2-L3 status post laminectomy. Was on Rocephin. Some confusion per family on whether this was to continue after discharge. Rocephin has been resumed. ID/Neuro surgery consulted. MRI of the back pending. 4. Acute on chronic back pain due to #3. 5. Type 2 diabetes. BS trend has been acceptable since admission. Add Humalog scale to Lantus. Trend glucose ac/hs. Hypoglycemic protocol. Carb controlled meals. 6. Diastolic CHF, chronic. No overt signs of decompensation. BB/ACE Daily weights. Strict intake and output. Low sodium diet. 7. Primary hypertension. Home regimen resumed. 8. Valvular disease. Known moderate tricuspid regurgitation with mild aortic stenosis continue to monitor. 9. AFIB. Eliquis  10. Normocytic anemia. Hbg 9.1. Monitor. Chief Complaint: Inability to care for herself. Hospital Course:     Abraham mcwilliams [de-identified] y.o. female who presents to the emergency department for evaluation of bilateral leg weakness.  The patient states after her morning routine yesterday morning she noticed increased weakness when walking and is now unable to walk with her walker. She underwent low back surgery a month ago and states that since the surgery she has been having trouble walking and has had to ambulate using a walker.  She also reports new paresthesias bilaterally in both lower extremities.  She denies chest pain, shortness of breath, diaphoresis, diarrhea, or constipation.    The patient has no Intake 300 ml   Output 150 ml   Net 150 ml       Diet:  ADULT DIET; Regular; 4 carb choices (60 gm/meal); Low Sodium (2 gm)    Exam:  /66   Pulse 61   Temp 98.6 °F (37 °C) (Oral)   Resp 18   Ht 5' 4\" (1.626 m)   Wt 171 lb (77.6 kg)   SpO2 96%   BMI 29.35 kg/m²     General appearance: No apparent distress, appears stated age and cooperative. HEENT: Pupils equal, round, and reactive to light. Conjunctivae/corneas clear. Neck: Supple, with full range of motion. No jugular venous distention. Trachea midline. Respiratory:  Normal respiratory effort. Clear to auscultation, bilaterally without Rales/Wheezes/Rhonchi. Cardiovascular: Regular rate and rhythm with normal S1/S2 + murmur, rubs or gallops. Abdomen: Soft, non-tender, non-distended with normal bowel sounds. Musculoskeletal: passive and active ROM x 4 extremities. Skin: Skin color, texture, turgor normal.  Surgical incision well healing to lumbar region. Neurologic:  Neurovascularly intact without any focal sensory/motor deficits. Cranial nerves: II-XII intact, grossly non-focal.  Psychiatric: Alert and oriented, thought content appropriate  Capillary Refill: Brisk,< 3 seconds   Peripheral Pulses: +2 palpable, equal bilaterally       Labs:   Recent Labs     07/24/21  1414 07/25/21  0706   WBC 6.1 5.2   HGB 10.0* 9.1*   HCT 33.4* 30.9*    352     Recent Labs     07/24/21  1414 07/25/21  0706    137   K 3.2* 3.7   CL 97* 99   CO2 27 27   BUN 10 10   CREATININE 0.4 0.5   CALCIUM 9.6 9.4     Recent Labs     07/25/21  0706   AST 10   ALT 6*   BILITOT 0.3   ALKPHOS 104     No results for input(s): INR in the last 72 hours. No results for input(s): Octavio Clap in the last 72 hours. No results for input(s): PROCAL in the last 72 hours.     Microbiology:      Urinalysis:      Lab Results   Component Value Date    NITRU Positive 06/29/2021    WBCUA 21-50 06/29/2021    BACTERIA 1+ 06/29/2021    RBCUA 51-75 06/29/2021    BLOODU TRACE 06/13/2014    GLUCOSEU Negative 06/29/2021       Radiology:  XR CHEST 1 VIEW    Result Date: 7/25/2021  PROCEDURE: XR CHEST 1 VIEW CLINICAL INFORMATION: picc line placement COMPARISON: No prior study. TECHNIQUE:  AP chest single view  FINDINGS: There is a right upper extremity PICC present with the tip overlying the cavoatrial junction. The heart size is normal. No focal consolidation, pleural effusion or pneumothorax is seen. Left shoulder joint arthrosis is noted. 1. Right upper extremity PICC tip projects over the cavoatrial junction. **This report has been created using voice recognition software. It may contain minor errors which are inherent in voice recognition technology. ** Final report electronically signed by Dr. Jonathan London on 7/25/2021 10:30 AM           Code Status: DNR-CCA          Electronically signed by BRENDAN Christensen CNP on 7/25/2021 at 12:50 PM

## 2021-07-25 NOTE — PROGRESS NOTES
Attempted to aspirate 1 ml of Cathflo and blood with no success. Informed Red Cristobal will need additional dwell time and another 1 ml of Cathflo to add to purple lumen of PICC. Also informed Kaitlynn Childers who is Resource Nurse will administer medication.

## 2021-07-25 NOTE — PROGRESS NOTES
Cathflo dwelling in purple lumen. Posi mark removed and  placed on the end with tape applied. Will allow Cathflo to dwell for 1 hour. Will come back at that time to assess if it needs a longer dwell time. Олег Summers RN informed.

## 2021-07-25 NOTE — PLAN OF CARE
Problem: Pain:  Goal: Pain level will decrease  Description: Pain level will decrease  Outcome: Ongoing  Goal: Control of acute pain  Description: Control of acute pain  Outcome: Ongoing  Goal: Control of chronic pain  Description: Control of chronic pain  Outcome: Ongoing     Problem: Falls - Risk of:  Goal: Will remain free from falls  Description: Will remain free from falls  Outcome: Ongoing  Goal: Absence of physical injury  Description: Absence of physical injury  Outcome: Ongoing     Problem: Skin Integrity:  Goal: Will show no infection signs and symptoms  Description: Will show no infection signs and symptoms  Outcome: Ongoing  Goal: Absence of new skin breakdown  Description: Absence of new skin breakdown  Outcome: Ongoing     Problem: Skin Integrity:  Goal: Will show no infection signs and symptoms  Description: Will show no infection signs and symptoms  Outcome: Ongoing  Goal: Absence of new skin breakdown  Description: Absence of new skin breakdown  Outcome: Ongoing

## 2021-07-25 NOTE — PROGRESS NOTES
Writer and teach was able to get patient up to bed side commode without any issue. Patient was able to sit up on the bedside without any help.  Patient was then able to pivot from bed to bedside commode

## 2021-07-26 PROBLEM — R53.1 WEAKNESS: Status: ACTIVE | Noted: 2021-07-26

## 2021-07-26 LAB
GLUCOSE BLD-MCNC: 128 MG/DL (ref 70–108)
GLUCOSE BLD-MCNC: 133 MG/DL (ref 70–108)
GLUCOSE BLD-MCNC: 148 MG/DL (ref 70–108)
GLUCOSE BLD-MCNC: 201 MG/DL (ref 70–108)

## 2021-07-26 PROCEDURE — 2580000003 HC RX 258: Performed by: INTERNAL MEDICINE

## 2021-07-26 PROCEDURE — 82948 REAGENT STRIP/BLOOD GLUCOSE: CPT

## 2021-07-26 PROCEDURE — G0378 HOSPITAL OBSERVATION PER HR: HCPCS

## 2021-07-26 PROCEDURE — 6360000002 HC RX W HCPCS: Performed by: INTERNAL MEDICINE

## 2021-07-26 PROCEDURE — 99231 SBSQ HOSP IP/OBS SF/LOW 25: CPT | Performed by: NURSE PRACTITIONER

## 2021-07-26 PROCEDURE — 6370000000 HC RX 637 (ALT 250 FOR IP): Performed by: NURSE PRACTITIONER

## 2021-07-26 PROCEDURE — 97162 PT EVAL MOD COMPLEX 30 MIN: CPT

## 2021-07-26 PROCEDURE — 97530 THERAPEUTIC ACTIVITIES: CPT

## 2021-07-26 PROCEDURE — 1200000000 HC SEMI PRIVATE

## 2021-07-26 PROCEDURE — 6370000000 HC RX 637 (ALT 250 FOR IP): Performed by: INTERNAL MEDICINE

## 2021-07-26 RX ADMIN — APIXABAN 5 MG: 5 TABLET, FILM COATED ORAL at 08:45

## 2021-07-26 RX ADMIN — Medication 400 MG: at 08:47

## 2021-07-26 RX ADMIN — APIXABAN 5 MG: 5 TABLET, FILM COATED ORAL at 21:05

## 2021-07-26 RX ADMIN — PANTOPRAZOLE SODIUM 40 MG: 40 TABLET, DELAYED RELEASE ORAL at 06:13

## 2021-07-26 RX ADMIN — SODIUM CHLORIDE, PRESERVATIVE FREE 10 ML: 5 INJECTION INTRAVENOUS at 08:47

## 2021-07-26 RX ADMIN — METOPROLOL TARTRATE 75 MG: 25 TABLET, FILM COATED ORAL at 08:45

## 2021-07-26 RX ADMIN — INSULIN LISPRO 2 UNITS: 100 INJECTION, SOLUTION INTRAVENOUS; SUBCUTANEOUS at 16:40

## 2021-07-26 RX ADMIN — DOCUSATE SODIUM 100 MG: 100 CAPSULE ORAL at 21:07

## 2021-07-26 RX ADMIN — HYDROCODONE BITARTRATE AND ACETAMINOPHEN 1 TABLET: 5; 325 TABLET ORAL at 21:07

## 2021-07-26 RX ADMIN — GABAPENTIN 100 MG: 100 CAPSULE ORAL at 21:05

## 2021-07-26 RX ADMIN — INSULIN GLARGINE 30 UNITS: 100 INJECTION, SOLUTION SUBCUTANEOUS at 21:11

## 2021-07-26 RX ADMIN — CYCLOBENZAPRINE 10 MG: 10 TABLET, FILM COATED ORAL at 08:44

## 2021-07-26 RX ADMIN — GABAPENTIN 100 MG: 100 CAPSULE ORAL at 14:49

## 2021-07-26 RX ADMIN — CYCLOBENZAPRINE 10 MG: 10 TABLET, FILM COATED ORAL at 21:07

## 2021-07-26 RX ADMIN — CITALOPRAM 10 MG: 20 TABLET, FILM COATED ORAL at 08:49

## 2021-07-26 RX ADMIN — ISOSORBIDE MONONITRATE 30 MG: 30 TABLET ORAL at 08:47

## 2021-07-26 RX ADMIN — BUSPIRONE HYDROCHLORIDE 5 MG: 5 TABLET ORAL at 21:05

## 2021-07-26 RX ADMIN — RAMIPRIL 20 MG: 10 CAPSULE ORAL at 08:44

## 2021-07-26 RX ADMIN — DOCUSATE SODIUM 100 MG: 100 CAPSULE ORAL at 08:44

## 2021-07-26 RX ADMIN — AMLODIPINE BESYLATE 5 MG: 5 TABLET ORAL at 08:48

## 2021-07-26 RX ADMIN — FUROSEMIDE 20 MG: 40 TABLET ORAL at 09:57

## 2021-07-26 RX ADMIN — GABAPENTIN 100 MG: 100 CAPSULE ORAL at 08:49

## 2021-07-26 RX ADMIN — SODIUM CHLORIDE, PRESERVATIVE FREE 10 ML: 5 INJECTION INTRAVENOUS at 21:01

## 2021-07-26 RX ADMIN — LABETALOL HYDROCHLORIDE 100 MG: 100 TABLET, FILM COATED ORAL at 08:47

## 2021-07-26 RX ADMIN — BUSPIRONE HYDROCHLORIDE 5 MG: 5 TABLET ORAL at 08:48

## 2021-07-26 RX ADMIN — CEFTRIAXONE SODIUM 1000 MG: 1 INJECTION, POWDER, FOR SOLUTION INTRAMUSCULAR; INTRAVENOUS at 18:39

## 2021-07-26 RX ADMIN — METOPROLOL TARTRATE 75 MG: 25 TABLET, FILM COATED ORAL at 21:05

## 2021-07-26 RX ADMIN — ATORVASTATIN CALCIUM 40 MG: 40 TABLET, FILM COATED ORAL at 21:05

## 2021-07-26 RX ADMIN — LABETALOL HYDROCHLORIDE 100 MG: 100 TABLET, FILM COATED ORAL at 21:05

## 2021-07-26 RX ADMIN — POTASSIUM CHLORIDE 10 MEQ: 750 TABLET, EXTENDED RELEASE ORAL at 08:44

## 2021-07-26 RX ADMIN — HYDROCODONE BITARTRATE AND ACETAMINOPHEN 1 TABLET: 5; 325 TABLET ORAL at 09:57

## 2021-07-26 RX ADMIN — AMIODARONE HYDROCHLORIDE 200 MG: 200 TABLET ORAL at 08:48

## 2021-07-26 ASSESSMENT — PAIN DESCRIPTION - ONSET: ONSET: ON-GOING

## 2021-07-26 ASSESSMENT — PAIN DESCRIPTION - FREQUENCY
FREQUENCY: CONTINUOUS
FREQUENCY: CONTINUOUS

## 2021-07-26 ASSESSMENT — PAIN SCALES - GENERAL
PAINLEVEL_OUTOF10: 7
PAINLEVEL_OUTOF10: 7
PAINLEVEL_OUTOF10: 0
PAINLEVEL_OUTOF10: 10
PAINLEVEL_OUTOF10: 0

## 2021-07-26 ASSESSMENT — PAIN DESCRIPTION - LOCATION
LOCATION: LEG
LOCATION: LEG

## 2021-07-26 ASSESSMENT — PAIN DESCRIPTION - PROGRESSION: CLINICAL_PROGRESSION: GRADUALLY WORSENING

## 2021-07-26 ASSESSMENT — PAIN DESCRIPTION - DESCRIPTORS
DESCRIPTORS: ACHING
DESCRIPTORS: BURNING

## 2021-07-26 ASSESSMENT — PAIN DESCRIPTION - ORIENTATION
ORIENTATION: RIGHT
ORIENTATION: RIGHT

## 2021-07-26 ASSESSMENT — PAIN DESCRIPTION - PAIN TYPE
TYPE: ACUTE PAIN
TYPE: ACUTE PAIN

## 2021-07-26 ASSESSMENT — PAIN - FUNCTIONAL ASSESSMENT
PAIN_FUNCTIONAL_ASSESSMENT: PREVENTS OR INTERFERES SOME ACTIVE ACTIVITIES AND ADLS
PAIN_FUNCTIONAL_ASSESSMENT: PREVENTS OR INTERFERES SOME ACTIVE ACTIVITIES AND ADLS

## 2021-07-26 NOTE — CARE COORDINATION
DISCHARGE/PLANNING EVALUATION  7/26/21, 3:47 PM EDT    Reason for Referral: discharge plan  Mental Status: alert, oriented   Decision Making:  Makes own decisions   Family/Social/Home Environment:  Raina Swenson lives at home alone. She has limited family support, was recently at [facility] until insurance no longer approved additional stay. She returned home on 7/23. Her daughter is not able to assist with care at home. Current Services including food security, transportation and housekeeping:  No current services, can arrange her own transportation, had been able to prepare simple meals  Current Equipment: walker   Payment Source: Anthem medicare  Concerns or Barriers to Discharge: Will need precert for ecf, was denied by insurance for longer stay at [facility] on 7/23  Post acute provider list with quality measures, geographic area and applicable managed care information provided. Questions regarding selection process answered: ecf list provided, patient is reviewing    Teach Back Method used with patient regarding care plan and discharge plan  Patient  verbalizes understanding of the plan of care and contributes to goal setting. Patient goals, treatment preferences and discharge plan:  Spoke with Raina Swenson about discharge plan. She is considering her options for returning to ecf, may not qualify for additional covered days through insurance. She is reviewing list of options.        Electronically signed by CASSANDRA Griffin on 7/26/2021 at 3:47 PM

## 2021-07-26 NOTE — PROGRESS NOTES
Hospitalist Progress Note    Patient:  González Elizondo      Unit/Bed:7K-05/005-A    YOB: 1940    MRN: [id number]       Acct: [de-identified]     PCP: Thea Paul MD    Date of Admission: 7/24/2021    Assessment/Plan:    1. Deconditioning, debility and generalized weakness following surgery with complication. Recently discharged from [facility]. Unable to care for herself at home. Will need placement back. SW/CM following. PT/OT. 2. Hypokalemia, resolved with replacement. 3. Osteomyelitis L2-L3 status post laminectomy for discitis. Was on Rocephin. Some confusion per family on whether this was to continue after discharge. Rocephin has been resumed. Awaiting ID recs for continuing. Neuro surgery seen. MRI of the back stable. 4. Acute on chronic back pain due to #3. 5. Type 2 diabetes. BS trend has been acceptable since admission. Humalog scale. Lantus. Trend glucose ac/hs. Hypoglycemic protocol. Carb controlled meals. 6. Diastolic CHF, chronic. No overt signs of decompensation. BB/ACE Daily weights. Strict intake and output. Low sodium diet. 7. Primary hypertension. Home regimen resumed. 8. Valvular disease. Known moderate tricuspid regurgitation with mild aortic stenosis continue to monitor. 9. AFIB. Eliquis  10. Normocytic anemia. Hbg 9.1. Monitor. No acute issues. Discharge planning underway. Chief Complaint: Inability to care for herself. Hospital Course:     Payam mcwilliams [de-identified] y.o. female who presents to the emergency department for evaluation of bilateral leg weakness.  The patient states after her morning routine yesterday morning she noticed increased weakness when walking and is now unable to walk with her walker.  She underwent low back surgery a month ago and states that since the surgery she has been having trouble walking and has had to ambulate using a walker.  She also reports new paresthesias bilaterally in both lower extremities.  She denies chest pain, shortness of breath, diaphoresis, diarrhea, or constipation. The patient has no other acute complaints at this time. MRI lumbar spine 2 weeks agp showed discitis and   osteomyelitis at L2-L3, laminectomy L2-3 with possible abscess posteriorly external to the   spinal canal, no insignificant interval changes noted overall. Dr Gricel Graham and DR Fred Andrews   consulted who both agree patient does not need any surgical intervention at this time. Patient was to discharge previousy on 6 wks of antiobiotic however this was not done at   [facility]. Patient has PICC. Patient is feeling much improved, labs stable, afebrile. Patient   working with therapy. Dr Fred Andrews wrote for Rx for Ceftriaxone and this will be continued for   6 wks. Patient stable for d/c to SNF,    Subjective (past 24 hours): denies back pain. Has pain to right thigh and left hip. No n/v/d.       Medications:  Reviewed    Infusion Medications    dextrose      sodium chloride       Scheduled Medications    pantoprazole  40 mg Oral QAM AC    insulin lispro  0-6 Units Subcutaneous TID WC    insulin lispro  0-3 Units Subcutaneous Nightly    busPIRone  5 mg Oral BID    citalopram  10 mg Oral Daily    docusate sodium  100 mg Oral BID    gabapentin  100 mg Oral TID    amiodarone  200 mg Oral Daily    amLODIPine  5 mg Oral Daily    apixaban  5 mg Oral BID    atorvastatin  40 mg Oral Nightly    cyclobenzaprine  10 mg Oral BID    isosorbide mononitrate  30 mg Oral Daily    labetalol  100 mg Oral BID    insulin glargine  30 Units Subcutaneous Nightly    magnesium oxide  400 mg Oral Daily    metoprolol tartrate  75 mg Oral BID    potassium chloride  10 mEq Oral Q MWF    ramipril  20 mg Oral Daily    sodium chloride flush  10 mL Intravenous 2 times per day    cefTRIAXone (ROCEPHIN) IV  1,000 mg Intravenous Q24H    furosemide  20 mg Oral Daily     PRN Meds: glucose, dextrose, glucagon (rDNA), dextrose, sodium chloride flush, sodium chloride, ondansetron **OR** 72 hours. No results for input(s): Winford Charles Mix in the last 72 hours. No results for input(s): PROCAL in the last 72 hours. Microbiology:      Urinalysis:      Lab Results   Component Value Date    NITRU Positive 06/29/2021    WBCUA 21-50 06/29/2021    BACTERIA 1+ 06/29/2021    RBCUA 51-75 06/29/2021    BLOODU TRACE 06/13/2014    GLUCOSEU Negative 06/29/2021       Radiology:  XR CHEST 1 VIEW    Result Date: 7/25/2021  PROCEDURE: XR CHEST 1 VIEW CLINICAL INFORMATION: picc line placement COMPARISON: No prior study. TECHNIQUE:  AP chest single view  FINDINGS: There is a right upper extremity PICC present with the tip overlying the cavoatrial junction. The heart size is normal. No focal consolidation, pleural effusion or pneumothorax is seen. Left shoulder joint arthrosis is noted. 1. Right upper extremity PICC tip projects over the cavoatrial junction. **This report has been created using voice recognition software. It may contain minor errors which are inherent in voice recognition technology. ** Final report electronically signed by Dr. Stacia Johnston on 7/25/2021 10:30 AM           Code Status: DNR-CCA          Electronically signed by BRENDAN Melo CNP on 7/26/2021 at 11:55 AM

## 2021-07-26 NOTE — CONSULTS
[address] [place] [address]                                  CONSULTATION    PATIENT NAME: Jonathon Christie                    :        1940  MED REC NO:   [id number]                           ROOM:       0005  ACCOUNT NO:   [de-identified]                           ADMIT DATE: 2021  PROVIDER:     Gilmer Eugene. Ann Kaiser M.D.    Juliette Dionnei:  2021    NEUROSURGERY CONSULTATION    TIME OF SERVICE:  1458 hours. REQUESTING PHYSICIAN:  Stephanie Vu MD    HISTORY OF PRESENT ILLNESS:  The patient is an 70-year-old female. She  is well known to me as she has had previous lumbar laminectomy because  of a diskitis. She had diskitis first and then a laminectomy and then  had a repeat laminectomy, all fairly recently over at [facility].  She is on IV antibiotics still and her Infectious Disease  physician had been Dr. Georgette Krishna at [facility], [place], operated by [facility].  She still has a lot of  right upper leg pain and says she cannot walk; however, she continues to  have antigravity strength. PHYSICAL EXAMINATION:  The incision scar looks good. There is no  drainage. No erythema. She has antigravity strength in her lower  extremities. A repeat MRI is reviewed including pertinent images and the report and  although she does have stenoses at several levels and still evidence of  infection, osteomyelitis, I do not think that she needs any further  surgery, and she should continue the antibiotics per Infectious Disease  service. PLAN:  After discharge, follow up in the office. Neurosurgery service  will follow her personally while she is in the hospital here. I  appreciate the opportunity to participate in the patient's care. The  case was discussed with Dr. Stephanie Vu as well.         Dick Valentin M.D.    D: 2021 23:41:49       T: 2021 1:25:22     [email or web address]  Job#: [id number]     Doc#: [id number]    CC: ADDENDUM TO THE ABOVE H&P/CONSULTATION     Alejandra Kelly   YOB: 1940  Account Number: [de-identified]       HISTORY OF PRESENT ILLNESS:  Alejandra Kelly is a [de-identified] y.o. female, admitted on :7/24/2021 12:51 PM      PROBLEM LIST:  Patient Active Problem List   Diagnosis    Microscopic hematuria    Cardiac murmur    Type 2 diabetes mellitus without complication, with long-term current use of insulin ([facility]    Essential hypertension    Anxiety    Closed fracture of left distal radius and ulna, initial encounter    Osteomyelitis of lumbar spine ([facility]    Spinal stenosis of lumbar region with neurogenic claudication    Discitis of lumbar region    Longstanding persistent atrial fibrillation (HCC)    UTI (urinary tract infection)    Weakness       MEDICATIONS: []None []Unknown  Prior to Admission medications    Medication Sig Start Date End Date Taking?  Authorizing Provider   cyclobenzaprine (FLEXERIL) 10 MG tablet cyclobenzaprine Cyclobenzaprine Active 10 MG PO Twice Daily as needed 12 April 8th, 2021 8:40pm 04-  Amberly 1153 (96091) 4/8/21   Historical Provider, MD   atorvastatin (LIPITOR) 40 MG tablet Take 1 tablet by mouth daily 3/29/21   Justine Molina MD   ramipril (ALTACE) 10 MG capsule Take 20 mg by mouth daily  4/13/20   Historical Provider, MD   amiodarone (CORDARONE) 200 MG tablet Take 1 tablet by mouth daily 4/21/20   Historical Provider, MD   metFORMIN (GLUCOPHAGE) 1000 MG tablet Take 1 tablet by mouth 2 times daily 3/11/21   Justine Molina MD   labetalol (NORMODYNE) 100 MG tablet Take 100 mg by mouth 2 times daily    Historical Provider, MD   furosemide (LASIX) 40 MG tablet Take 40 mg by mouth See Admin Instructions Qd on M,W,F    Historical Provider, MD   magnesium oxide (MAG-OX) 400 MG tablet Take 400 mg by mouth daily    Historical Provider, MD   potassium chloride (MICRO-K) 10 MEQ extended release capsule Take 10 mEq by mouth See Admin Instructions Qd on M,W,F    Historical Provider, MD   isosorbide mononitrate (IMDUR) 30 MG extended release tablet Take 30 mg by mouth daily    Historical Provider, MD   amLODIPine (NORVASC) 5 MG tablet Take 1 tablet by mouth daily 4/13/20   Historical Provider, MD RENZO VALEROOSTAR 100 UNIT/ML injection pen Inject 30 Units into the skin daily 3/11/21   Shobha Khan MD   apixaban (ELIQUIS) 5 MG TABS tablet Take 1 tablet by mouth 2 times daily 9/30/20   Shobha Khan MD   metoprolol tartrate (LOPRESSOR) 25 MG tablet Take 3 tablets by mouth 2 times daily 3/23/20   Shobha Khan MD   Insulin Pen Needle 31G X 8 MM MISC 1 each by Does not apply route daily 3/27/19   Shobha Khan MD   Omega-3 Fatty Acids (FISH OIL) 1000 MG CAPS Take 3,000 mg by mouth 2 times daily    Historical Provider, MD   Multiple Vitamins-Minerals (OCUVITE PRESERVISION PO) Take by mouth daily    Historical Provider, MD   Multiple Vitamins-Minerals (THERAPEUTIC MULTIVITAMIN-MINERALS) tablet Take 1 tablet by mouth daily.     Historical Provider, MD       Current Facility-Administered Medications   Medication Dose Route Frequency Provider Last Rate Last Admin    pantoprazole (PROTONIX) tablet 40 mg  40 mg Oral QAM AC Maksim Cardenas APRN - CNP   40 mg at 07/26/21 0613    insulin lispro (HUMALOG) injection vial 0-6 Units  0-6 Units Subcutaneous TID WC Maksim Cardenas, APRN - CNP        insulin lispro (HUMALOG) injection vial 0-3 Units  0-3 Units Subcutaneous Nightly Maksim Cardenas APRN - CNP   1 Units at 07/25/21 2146    glucose (GLUTOSE) 40 % oral gel 15 g  15 g Oral PRN Maksim Cardenas, APRN - CNP        dextrose 50 % IV solution  12.5 g Intravenous PRN Maksim Cardenas, APRN - CNP        glucagon (rDNA) injection 1 mg  1 mg Intramuscular PRN Maksim Cardenas, APRN - CNP        dextrose 5 % solution  100 mL/hr Intravenous PRN Maksim Cardenas, APRN - CNP        busPIRone (BUSPAR) tablet 5 mg  5 mg Oral BID Jeramyie Ary Garyhanchith, APRN - CNP   5 mg at 07/26/21 0848    citalopram (CELEXA) tablet 10 mg  10 mg Oral Daily Jeramyie Spurr Paphanchith, APRN - CNP   10 mg at 07/26/21 0849    docusate sodium (COLACE) capsule 100 mg  100 mg Oral BID Sia Gallagherr Cookiehanchiperfecto, APRN - CNP   100 mg at 07/26/21 0844    gabapentin (NEURONTIN) capsule 100 mg  100 mg Oral TID Sia Garyhanchiperfecto, APRN - CNP   100 mg at 07/26/21 1449    amiodarone (CORDARONE) tablet 200 mg  200 mg Oral Daily Susan Fast, MD   200 mg at 07/26/21 0848    amLODIPine (NORVASC) tablet 5 mg  5 mg Oral Daily Susan Fast, MD   5 mg at 07/26/21 0848    apixaban (ELIQUIS) tablet 5 mg  5 mg Oral BID Susan Fast, MD   5 mg at 07/26/21 0845    atorvastatin (LIPITOR) tablet 40 mg  40 mg Oral Nightly Susan Fast, MD   40 mg at 07/25/21 2138    cyclobenzaprine (FLEXERIL) tablet 10 mg  10 mg Oral BID Susan Fast, MD   10 mg at 07/26/21 0844    isosorbide mononitrate (IMDUR) extended release tablet 30 mg  30 mg Oral Daily Susan Fast, MD   30 mg at 07/26/21 0847    labetalol (NORMODYNE) tablet 100 mg  100 mg Oral BID Susan Fast, MD   100 mg at 07/26/21 0847    insulin glargine (LANTUS) injection vial 30 Units  30 Units Subcutaneous Nightly Susan Fast, MD   30 Units at 07/25/21 2146    magnesium oxide (MAG-OX) tablet 400 mg  400 mg Oral Daily Susan Fast, MD   400 mg at 07/26/21 0847    metoprolol tartrate (LOPRESSOR) tablet 75 mg  75 mg Oral BID Susan Fast, MD   75 mg at 07/26/21 0845    potassium chloride (KLOR-CON) extended release tablet 10 mEq  10 mEq Oral Q MWF Susan Fast, MD   10 mEq at 07/26/21 0844    ramipril (ALTACE) capsule 20 mg  20 mg Oral Daily Susan Fast, MD   20 mg at 07/26/21 0844    sodium chloride flush 0.9 % injection 10 mL  10 mL Intravenous 2 times per day Susan Denny MD   10 mL at 07/26/21 0847    sodium chloride flush 0.9 % injection 10 mL  10 mL Intravenous PRN Susan Denny MD        0.9 % sodium chloride infusion  25 mL Intravenous PRN Antwan Fernandez MD        ondansetron (ZOFRAN-ODT) disintegrating tablet 4 mg  4 mg Oral Q8H PRN Antwan Fernandez MD   4 mg at 07/25/21 0857    Or    ondansetron (ZOFRAN) injection 4 mg  4 mg Intravenous Q6H PRN Antwan Fernandez MD        polyethylene glycol (GLYCOLAX) packet 17 g  17 g Oral Daily PRN Antwan Fernandez MD        acetaminophen (TYLENOL) tablet 650 mg  650 mg Oral Q6H PRN Antwan Fernandez MD        Or   [facility] acetaminophen (TYLENOL) suppository 650 mg  650 mg Rectal Q6H PRN Antwan Fernandez MD        potassium chloride (KLOR-CON M) extended release tablet 40 mEq  40 mEq Oral PRN Antwan Fernandez MD        Or    potassium bicarb-citric acid (EFFER-K) effervescent tablet 40 mEq  40 mEq Oral PRN Antwan Fernandez MD        Or    potassium chloride 10 mEq/100 mL IVPB (Peripheral Line)  10 mEq Intravenous PRN Antwan Fernandez MD        magnesium sulfate 2000 mg in 50 mL IVPB premix  2,000 mg Intravenous PRN Antwan Fernandez MD        HYDROcodone-acetaminophen (NORCO) 5-325 MG per tablet 1 tablet  1 tablet Oral Q6H PRN Antwan Fernandez MD   1 tablet at 07/26/21 0957    cefTRIAXone (ROCEPHIN) 1000 mg IVPB in 50 mL D5W minibag  1,000 mg Intravenous Q24H Antawn Fernandez MD   Stopped at 07/25/21 2207    furosemide (LASIX) tablet 20 mg  20 mg Oral Daily Antwan Fernandez MD   20 mg at 07/26/21 0957        glucose, 15 g, PRN  dextrose, 12.5 g, PRN  glucagon (rDNA), 1 mg, PRN  dextrose, 100 mL/hr, PRN  sodium chloride flush, 10 mL, PRN  sodium chloride, 25 mL, PRN  ondansetron, 4 mg, Q8H PRN   Or  ondansetron, 4 mg, Q6H PRN  polyethylene glycol, 17 g, Daily PRN  acetaminophen, 650 mg, Q6H PRN   Or  acetaminophen, 650 mg, Q6H PRN  potassium chloride, 40 mEq, PRN   Or  potassium alternative oral replacement, 40 mEq, PRN   Or  potassium chloride, 10 mEq, PRN  magnesium sulfate, 2,000 mg, PRN  HYDROcodone 5 mg - acetaminophen, 1 tablet, Q6H PRN         dextrose      sodium chloride           ALLERGIES: []None []Unknown   Trimethoprim and Sulfa antibiotics    PAST MEDICAL  HISTORY: []None []Unknown    has a past medical history of Anxiety, Atrial fibrillation (HCC), Cardiac murmur, Hypercholesterolemia, Hypertension, Microscopic hematuria, Obesity (BMI 30-39.9), Snoring, and Type II or unspecified type diabetes mellitus without mention of complication, not stated as uncontrolled. PAST SURGICAL  HISTORY: []None []Unknown   has a past surgical history that includes Knee Arthroplasty (Bilateral); joint replacement (Left, 2013); and ORIF DISTAL RADIUS FRACTURE (Right, 7/30/2019).     SOCIAL HISTORY:   Social History     Tobacco Use    Smoking status: Never Smoker    Smokeless tobacco: Never Used   Substance Use Topics    Alcohol use: Never       FAMILY HISTORY:  Family History   Problem Relation Age of Onset    Heart Disease Mother 79        CABG, MI    Stroke Mother     Heart Disease Father     Cancer Father         lung    Arthritis Father     Cancer Sister         brain tumor    Arthritis Brother     Arthritis Sister        LABS  CBC:   Lab Results   Component Value Date    WBC 5.2 07/25/2021    RBC 3.44 07/25/2021    HGB 9.1 07/25/2021    HCT 30.9 07/25/2021    MCV 89.8 07/25/2021    MCH 26.5 07/25/2021    MCHC 29.4 07/25/2021    RDW 17.4 06/29/2021     07/25/2021    MPV 9.1 07/25/2021     BMP:    Lab Results   Component Value Date     07/25/2021    K 3.7 07/25/2021    CL 99 07/25/2021    CO2 27 07/25/2021    BUN 10 07/25/2021    LABALBU 3.8 07/25/2021    CREATININE 0.5 07/25/2021    CALCIUM 9.4 07/25/2021    LABGLOM >90 07/25/2021    GLUCOSE 120 07/25/2021    GLUCOSE 138 06/29/2021     PT/INR:    Lab Results   Component Value Date    PROTIME 31.0 04/13/2020    INR 2.72 04/13/2020     PTT:    Lab Results   Component Value Date    APTT 43.0 04/13/2020   [APTT}  Troponin:    Lab Results   Component Value Date    TROPONINI 0.01 04/13/2020       Carlos Allen MD,MD  Electronically signed 7/26/2021 at 3:40 PM

## 2021-07-26 NOTE — PROGRESS NOTES
Aspirated cath mark that was previous instilled. 10 mL of blood aspirated from purple lumen of PICC line without difficulty. New posi-flow placed on purple lumen of PICC and flushed with 20 mL of saline. Line flushes with no difficulty noted. Brisk blood returned noted in between 10 mL saline flushes. Alcohol cap placed on posi-flow to maintain sterility. Updated primary RN.

## 2021-07-26 NOTE — PROGRESS NOTES
[address] [address]  INPATIENT PHYSICAL THERAPY  EVALUATION  [facility] ORTHOPEDICS 7K - 7K-05/005-A    Time In: 4520  Time Out: 7036  Timed Code Treatment Minutes: 23 Minutes  Minutes: 32          Date: 2021  Patient Name: Rosette Brown,  Gender:  female        MRN: [id number]  : 1940  ([de-identified] y.o.)      Referring Practitioner: Gabriel Dial MD  Diagnosis: UTI  Additional Pertinent Hx: Per HPI: Yasmeen Morin is a [de-identified] y.o. female who presents to the emergency department for evaluation of bilateral leg weakness. The patient states after her morning routine yesterday morning she noticed increased weakness when walking and is now unable to walk with her walker. She underwent low back surgery a month ago and states that since the surgery she has been having trouble walking and has had to ambulate using a walker. She also reports new paresthesias bilaterally in both lower extremities. She denies chest pain, shortness of breath, diaphoresis, diarrhea, or constipation. \"     Restrictions/Precautions:  Restrictions/Precautions: General Precautions, Fall Risk  Position Activity Restriction  Spinal Precautions: No Bending, No Lifting, No Twisting  Other position/activity restrictions: hx lumbar laminectomy L2-L3 and osteomyelitis tx with abx    Subjective:  Chart Reviewed: Yes  Patient assessed for rehabilitation services?: Yes  Family / Caregiver Present: No  Subjective: RN approved PT evaluation. Pt resting in bed, stating she has bad pain in R leg worse than L (thigh/hip region). She is agreeable to therapy, but requests to return to bed, as she does not want to sit in chair until her leg pain decreases. General:  Follows Commands: Within Functional Limits    Vision: Impaired  Vision Exceptions: Wears glasses at all times    Hearing: Within functional limits    Pain: bilateral hips/thighs--not quantified, pt rubbing R hip to attempt to relieve pain. She stated it did get slightly better with movement.      Vitals: Vitals not assessed per clinical judgement, see nursing flowsheet    Social/Functional History:    Lives With: Daughter (daughter she lives with has hip and knee injuries--has difficulty getting around herself)  Type of Home: Mobile home  Home Layout: One level  Home Access: Stairs to enter with rails  Entrance Stairs - Number of Steps: 4  Home Equipment: Rolling walker, 4 wheeled walker, Catha Distad (daughter is currently using w/c; pt has been using walker since surgery, but did not use AD PTA)      Ambulation Assistance: Needs assistance (pt states on her last day at [facility] she was walking independently with walker, but that was the first time, and pt got much weaker following that)  Transfer Assistance: Independent    Active : No     Additional Comments: pt states she left the SNF [facility]) last Friday and felt she was not ready to go home yet    OBJECTIVE:  Range of Motion:  Bilateral Lower Extremity: WFL    Strength:  Bilateral Lower Extremity: Impaired - functional weakness present   *pt also has decreased use of L UE, limiting ability of L UE to assist in transfers    Balance:  Static Sitting Balance:  Supervision  Dynamic Sitting Balance: Supervision  Static Standing Balance: Contact Guard Assistance  Dynamic Standing Balance: Contact Guard Assistance    Bed Mobility:  Rolling to Right: Stand By Assistance, with verbal cues , with increased time for completion   Supine to Sit: Contact Guard Assistance, X 1, with head of bed raised, with verbal cues , with increased time for completion  Sit to Supine: Minimal Assistance, X 1, with verbal cues , with increased time for completion --pt required assist with LEs due to increased back pain at this time   Scooting: Stand By Assistance, with increased time for completion for seated scooting, use of hercules to scoot pt towards HOB (attempted scooting, but in too much pain to complete independently)    Transfers:  Sit to Stand: Contact Guard Assistance, X 1, with increased time for completion, cues for hand placement, with verbal cues  Stand to [facility], X 1, with increased time for completion, cues for hand placement   *from EOB, chair, and toilet  *pt used grab bars appropriately at toilet with cues   *several cues for hand placement when standing from bed--L UE weakness presenting difficulty with pushing with that arm  *performed 12 total STS transfers: performed 5xSTS twice, plus transfer from EOB and toilet     Ambulation:  Contact Guard Assistance, X 1, with cues for safety, with verbal cues , with increased time for completion  Distance: 15' x 2 (pt declined further ambulation, as she stated if felt like her legs were getting weaker)  Surface: Level Tile  Device:Rolling Walker  Gait Deviations: Forward Flexed Posture, Slow Daxa, Decreased Step Length Bilaterally, Decreased Gait Speed and Narrow Base of Support  *assist level remained consistent, however, pt reported feeling of getting weaker with continued ambulation. Functional Outcome Measures: Completed  -PAC Inpatient Mobility Raw Score : 16  -PAC Inpatient T-Scale Score : 40.78   5X Sit-to-Stand Test: 53 seconds              Age Bracket         Time (sec)               61-75 yo          7.1               66-78 yo          12.6               80-81 yo          14.8       Fall Risk:   Geriatrics    Need for further assessment of fall risk greater or equal to 12 sec   Recurrent fall and frailty > 15 sec    Vestibular Disorders   Fall risk > 15 sec   Parkinson's Disease   Fall risk > 16 sec       ASSESSMENT:  Activity Tolerance:  Patient tolerance of  treatment: fair. Limited by pain. Treatment Initiated: Treatment and education initiated within context of evaluation.   Evaluation time included review of current medical information, gathering information related to past medical, social and functional history, completion of standardized testing, formal and informal observation of tasks, assessment of data and development of plan of care and goals. Treatment time included skilled education and facilitation of tasks to increase safety and independence with functional mobility for improved independence and quality of life. Assessment: Body structures, Functions, Activity limitations: Decreased functional mobility , Increased pain, Decreased balance, Decreased posture, Decreased strength, Decreased endurance, Decreased safe awareness  Assessment: Pt is below PLOF by way of bed mobility, transfers, and ambulation s/p increased weakness following back surgery and osteomyelitis of the spine. Pt is primarily limited by decreased endurance, and decreased functional strength with activities. She is also limited by thigh/hip pain on date of evaluation. Pt will benefit from continued physical therapy to return to PLOF and ensure a safe return home when appropriate. Prognosis: Good    REQUIRES PT FOLLOW UP: Yes    Discharge Recommendations:  Discharge Recommendations: Continue to assess pending progress, Patient would benefit from continued therapy after discharge, 24 hour supervision or assist. Pt does not have adequate support at home to return home at this time.      Patient Education:  PT Education: Goals, General Safety, Gait Training, PT Role, Plan of Care, Transfer Training, Energy Conservation, Functional Mobility Training    Equipment Recommendations:  Equipment Needed: No    Plan:  Times per week: 6 x O  Times per day: Daily  Current Treatment Recommendations: Strengthening, Neuromuscular Re-education, Home Exercise Program, Safety Education & Training, Balance Training, Endurance Training, Patient/Caregiver Education & Training, Functional Mobility Training, Transfer Training, Gait Training, Stair training    Goals:  Patient goals : go somewhere for therapy, find out what is causing leg weakness  Short term goals  Time Frame for Short term goals: by hospital discharge  Short term goal 1: Supine to/from sit, using log roll, with modified independence for ease of transfers at discharge site. Short term goal 2: Sit to/from stand with modified independence for ease of transfers at discharge site. Short term goal 3: Improve 5x STS time to less than 14.8 seconds to reflect age appropriate norms, indicating improved functional strength and endurance. Short term goal 4: Ambulate 48' with RW and modified independence for home distance ambulation. Short term goal 5: Ascend/descend 4 steps with BHR with modified independence for entry into home. Long term goals  Time Frame for Long term goals : N/A due to short ELOS. Following session, patient left in safe position with all fall risk precautions in place.     Cady Barnett, PT, DPT

## 2021-07-26 NOTE — CARE COORDINATION
7/26/21, 10:32 AM EDT  DISCHARGE PLANNING EVALUATION:    Ricardo Dey       Admitted: 7/24/2021/ Danielswapna [address]. day: 0   Location: 7-05/005-A Reason for admit: UTI (urinary tract infection) [N39.0]   PMH:  has a past medical history of Anxiety, Atrial fibrillation ([address] Cardiac murmur, Hypercholesterolemia, Hypertension, Microscopic hematuria, Obesity (BMI 30-39.9), Snoring, and Type II or unspecified type diabetes mellitus without mention of complication, not stated as uncontrolled. Procedure: no  Barriers to Discharge:  Admitted per hospitalist with UTI. ID consult. IV antibiotics. Neurosurgery consult for back pain. PT/OT. N/V checks. Pain management. Daily weights. Strict I&O.   PCP: Diann Garcia MD   %    Patient Goals/Plan/Treatment Preferences: I spoke with Anayeli Garcia and hospitalist Bharat. Anayeli Garcia wants to go to a nursing home. She said that she knows she will need to personal pay. She was just released from [facility], where she personally paid for last 2 days. He daughter was unable to get her out of the car and brought her to [address] [place] [address] ED. Bharat said that she is medically stable for discharge. Will likely not need IV antibiotics at [facility]. I discussed possible Medicaid for nursing home option with her and she likely has too many assets yet. Neil Mahoney from public benefits will talk with her and provide her with information that she can share with her daughter. SW consulted for ECF placement. Transportation/Food Security/Housekeeping Addressed:  No issues identified.

## 2021-07-27 LAB
GLUCOSE BLD-MCNC: 120 MG/DL (ref 70–108)
GLUCOSE BLD-MCNC: 149 MG/DL (ref 70–108)
GLUCOSE BLD-MCNC: 155 MG/DL (ref 70–108)
GLUCOSE BLD-MCNC: 171 MG/DL (ref 70–108)

## 2021-07-27 PROCEDURE — 97116 GAIT TRAINING THERAPY: CPT

## 2021-07-27 PROCEDURE — 6370000000 HC RX 637 (ALT 250 FOR IP): Performed by: NURSE PRACTITIONER

## 2021-07-27 PROCEDURE — 1200000000 HC SEMI PRIVATE

## 2021-07-27 PROCEDURE — 2580000003 HC RX 258: Performed by: INTERNAL MEDICINE

## 2021-07-27 PROCEDURE — 99231 SBSQ HOSP IP/OBS SF/LOW 25: CPT | Performed by: NURSE PRACTITIONER

## 2021-07-27 PROCEDURE — 82948 REAGENT STRIP/BLOOD GLUCOSE: CPT

## 2021-07-27 PROCEDURE — 6370000000 HC RX 637 (ALT 250 FOR IP): Performed by: INTERNAL MEDICINE

## 2021-07-27 PROCEDURE — 97110 THERAPEUTIC EXERCISES: CPT

## 2021-07-27 PROCEDURE — 6360000002 HC RX W HCPCS: Performed by: INTERNAL MEDICINE

## 2021-07-27 RX ADMIN — CITALOPRAM 10 MG: 20 TABLET, FILM COATED ORAL at 09:56

## 2021-07-27 RX ADMIN — CYCLOBENZAPRINE 10 MG: 10 TABLET, FILM COATED ORAL at 20:17

## 2021-07-27 RX ADMIN — CYCLOBENZAPRINE 10 MG: 10 TABLET, FILM COATED ORAL at 12:11

## 2021-07-27 RX ADMIN — PANTOPRAZOLE SODIUM 40 MG: 40 TABLET, DELAYED RELEASE ORAL at 05:54

## 2021-07-27 RX ADMIN — SODIUM CHLORIDE, PRESERVATIVE FREE 10 ML: 5 INJECTION INTRAVENOUS at 09:57

## 2021-07-27 RX ADMIN — GABAPENTIN 100 MG: 100 CAPSULE ORAL at 20:14

## 2021-07-27 RX ADMIN — LABETALOL HYDROCHLORIDE 100 MG: 100 TABLET, FILM COATED ORAL at 20:14

## 2021-07-27 RX ADMIN — DOCUSATE SODIUM 100 MG: 100 CAPSULE ORAL at 09:56

## 2021-07-27 RX ADMIN — AMIODARONE HYDROCHLORIDE 200 MG: 200 TABLET ORAL at 09:57

## 2021-07-27 RX ADMIN — INSULIN GLARGINE 30 UNITS: 100 INJECTION, SOLUTION SUBCUTANEOUS at 21:33

## 2021-07-27 RX ADMIN — CEFTRIAXONE SODIUM 1000 MG: 1 INJECTION, POWDER, FOR SOLUTION INTRAMUSCULAR; INTRAVENOUS at 18:52

## 2021-07-27 RX ADMIN — DOCUSATE SODIUM 100 MG: 100 CAPSULE ORAL at 20:17

## 2021-07-27 RX ADMIN — FUROSEMIDE 20 MG: 40 TABLET ORAL at 12:12

## 2021-07-27 RX ADMIN — BUSPIRONE HYDROCHLORIDE 5 MG: 5 TABLET ORAL at 20:15

## 2021-07-27 RX ADMIN — SODIUM CHLORIDE, PRESERVATIVE FREE 10 ML: 5 INJECTION INTRAVENOUS at 20:12

## 2021-07-27 RX ADMIN — APIXABAN 5 MG: 5 TABLET, FILM COATED ORAL at 20:15

## 2021-07-27 RX ADMIN — HYDROCODONE BITARTRATE AND ACETAMINOPHEN 1 TABLET: 5; 325 TABLET ORAL at 12:14

## 2021-07-27 RX ADMIN — HYDROCODONE BITARTRATE AND ACETAMINOPHEN 1 TABLET: 5; 325 TABLET ORAL at 21:32

## 2021-07-27 RX ADMIN — Medication 400 MG: at 09:56

## 2021-07-27 RX ADMIN — HYDROCODONE BITARTRATE AND ACETAMINOPHEN 1 TABLET: 5; 325 TABLET ORAL at 03:52

## 2021-07-27 RX ADMIN — METOPROLOL TARTRATE 75 MG: 25 TABLET, FILM COATED ORAL at 20:14

## 2021-07-27 RX ADMIN — AMLODIPINE BESYLATE 5 MG: 5 TABLET ORAL at 09:57

## 2021-07-27 RX ADMIN — APIXABAN 5 MG: 5 TABLET, FILM COATED ORAL at 09:57

## 2021-07-27 RX ADMIN — RAMIPRIL 20 MG: 10 CAPSULE ORAL at 09:56

## 2021-07-27 RX ADMIN — GABAPENTIN 100 MG: 100 CAPSULE ORAL at 15:24

## 2021-07-27 RX ADMIN — ATORVASTATIN CALCIUM 40 MG: 40 TABLET, FILM COATED ORAL at 20:15

## 2021-07-27 RX ADMIN — LABETALOL HYDROCHLORIDE 100 MG: 100 TABLET, FILM COATED ORAL at 09:56

## 2021-07-27 RX ADMIN — GABAPENTIN 100 MG: 100 CAPSULE ORAL at 09:56

## 2021-07-27 RX ADMIN — BUSPIRONE HYDROCHLORIDE 5 MG: 5 TABLET ORAL at 09:57

## 2021-07-27 RX ADMIN — METOPROLOL TARTRATE 75 MG: 25 TABLET, FILM COATED ORAL at 09:56

## 2021-07-27 RX ADMIN — ISOSORBIDE MONONITRATE 30 MG: 30 TABLET ORAL at 09:56

## 2021-07-27 ASSESSMENT — PAIN DESCRIPTION - PAIN TYPE
TYPE: ACUTE PAIN;CHRONIC PAIN
TYPE: CHRONIC PAIN

## 2021-07-27 ASSESSMENT — PAIN DESCRIPTION - LOCATION
LOCATION: LEG
LOCATION: BACK

## 2021-07-27 ASSESSMENT — PAIN DESCRIPTION - ORIENTATION
ORIENTATION: POSTERIOR
ORIENTATION: RIGHT

## 2021-07-27 ASSESSMENT — PAIN SCALES - GENERAL
PAINLEVEL_OUTOF10: 7
PAINLEVEL_OUTOF10: 0
PAINLEVEL_OUTOF10: 6
PAINLEVEL_OUTOF10: 6
PAINLEVEL_OUTOF10: 5
PAINLEVEL_OUTOF10: 6

## 2021-07-27 NOTE — PROGRESS NOTES
[facility]  INPATIENT PHYSICAL THERAPY  DAILY NOTE  [facility] ORTHOPEDICS 7K - 7K-05005-A    Time In: 725  Time Out: 7629  Timed Code Treatment Minutes: 23 Minutes  Minutes: 23          Date: 2021  Patient Name: Sarina Cogan,  Gender:  female        MRN: [id number]  : 1940  ([de-identified] y.o.)     Referring Practitioner: Shashi Oshea MD  Diagnosis: UTI  Additional Pertinent Hx: Per HPI: Deyanira Flores is a [de-identified] y.o. female who presents to the emergency department for evaluation of bilateral leg weakness. The patient states after her morning routine yesterday morning she noticed increased weakness when walking and is now unable to walk with her walker. She underwent low back surgery a month ago and states that since the surgery she has been having trouble walking and has had to ambulate using a walker. She also reports new paresthesias bilaterally in both lower extremities. She denies chest pain, shortness of breath, diaphoresis, diarrhea, or constipation. \"     Prior Level of Function:  Lives With: Daughter (daughter she lives with has hip and knee injuries--has difficulty getting around herself)  Type of Home: Mobile home  Home Layout: One level  Home Access: Stairs to enter with rails  Entrance Stairs - Number of Steps: 4  Home Equipment: Rolling walker, 4 wheeled walker, Jackie Simon (daughter is currently using w/c; pt has been using walker since surgery, but did not use AD PTA)        Ambulation Assistance: Needs assistance (pt states on her last day at [facility] she was walking independently with walker, but that was the first time, and pt got much weaker following that)  Transfer Assistance: Independent  Active : No  Additional Comments: pt states she left the SNF [facility]) last Friday and felt she was not ready to go home yet    Restrictions/Precautions:  Restrictions/Precautions: General Precautions, Fall Risk  Position Activity Restriction  Spinal Precautions: No Bending, No Lifting, No Twisting  Other position/activity restrictions: hx lumbar laminectomy L2-L3 and osteomyelitis tx with abx     SUBJECTIVE: RN approved session. Pt in bed upon arrival pt pleasantly agrees to session. Pt confused at times during session repeating questions. Pt In bed upon leaving with all needs within reach    PAIN: not rated, back and right LE    Vitals: Vitals not assessed per clinical judgement, see nursing flowsheet    OBJECTIVE:  Bed Mobility:  Rolling to Right: Contact Guard Assistance, Minimal Assistance, with head of bed flat, with rail, with verbal cues. Educated on importance of log rolling with cues to use rail  Supine to Sit: Minimal Assistance, with head of bed flat, with rail, with increased time for completion. Assist at trunk with pt managing LEs  Sit to Supine: Minimal Assistance, with verbal cues. Assist for LEs due to pain    Transfers:  Sit to Stand: Contact Guard Assistance, with increased time for completion, cues for hand placement, with verbal cues. Cues to push from bed vs walker   Stand to [place] [id number], with verbal cues. Good hand placement for toilet use however not for bed     Ambulation:  Contact Guard Assistance, with verbal cues , with increased time for completion  Distance: 10', 20'  Surface: Level Tile  Device:Rolling Walker  Gait Deviations: Forward Flexed Posture, Slow [place], Decreased Step Length Bilaterally, Decreased Weight Shift Bilaterally, Decreased Gait Speed and Decreased Heel Strike Bilaterally  Pt had minimal collapse like motion at end of ambulation due to weakness. Cues for increased step length and posture. Pt fatigued very easily    Balance:  Static Sitting Balance:  Stand By Assistance. Pt sat on toilet with no sway or LOB with cues for posture and foot placement   Dynamic Standing Balance: Contact Guard Assistance.  Pt stood and completed pericare and clothing management with 1 UE support with cues for maintaining a good NISHI and posture     Exercise:  Patient was guided in 1 set(s) 10 reps of exercise to both lower extremities. Heelslides, Hip abduction/adduction, Straight leg raises, Seated marches, Seated heel/toe raises and Long arc quads. Exercises were completed for increased independence with functional mobility. Functional Outcome Measures: Completed  AM-PAC Inpatient Mobility Raw Score : 16  AM-PAC Inpatient T-Scale Score : 40.78    ASSESSMENT:  Assessment: Patient progressing toward established goals. Activity Tolerance:  Patient tolerance of  treatment: good. Pt tolerated session well. Decreased strength, balance, and endurance. Pt demos difficulty with further ambulation demonstrating increased weakness. Pt would benefit from continued PT for improved functional mobility and increased independence      Equipment Recommendations:Equipment Needed: No  Discharge Recommendations:  Continue to assess pending progress, 24 hour supervision or assist, Home with Home health PT    Plan: Times per week: 6 x O  Times per day: Daily  Current Treatment Recommendations: Strengthening, Neuromuscular Re-education, Home Exercise Program, Safety Education & Training, Balance Training, Endurance Training, Patient/Caregiver Education & Training, Functional Mobility Training, Transfer Training, Gait Training, Stair training    Patient Education  Patient Education: Plan of Care, [employer] Mobility, Transfers, Gait, Use of Gait [place], Verbal Exercise Instruction    Goals:  Patient goals : go somewhere for therapy, find out what is causing leg weakness  Short term goals  Time Frame for Short term goals: by hospital discharge  Short term goal 1: Supine to/from sit, using log roll, with modified independence for ease of transfers at discharge site. Short term goal 2: Sit to/from stand with modified independence for ease of transfers at discharge site.   Short term goal 3: Improve 5x STS time to less than 14.8 seconds to reflect age appropriate norms, indicating improved functional strength and endurance. Short term goal 4: Ambulate 48' with RW and modified independence for home distance ambulation. Short term goal 5: Ascend/descend 4 steps with BHR with modified independence for entry into home. Long term goals  Time Frame for Long term goals : N/A due to short ELOS. Following session, patient left in safe position with all fall risk precautions in place.

## 2021-07-27 NOTE — CARE COORDINATION
7/27/21, 10:44 AM EDT    DISCHARGE ON [address] day: 1  Location: -05/005-A Reason for admit: UTI (urinary tract infection) [N39.0]  Weakness [R53.1]   Procedure:  no  Barriers to Discharge:  Admitted per hospitalist with UTI. ID consult. IV antibiotics. Neurosurgery consult for back pain. PT/OT. N/V checks. Pain management. Daily weights. Strict I&O.   PCP: Cindy Ceballos MD  Readmission Risk Score: 14%  Patient Goals/Plan/Treatment Preferences: I spoke with Milagros Gutierrez and daughter Corina Scott (nurse who is currently disabled with fractured hip) They both want nursing home placement. She does not have the finances to personally pay for nursing home. Corina Scott said that Milagros Gutierrez has less than $2000 total in savings/ checking. She would like Medicaid application for nursing home placement. I left another message for Ricki Wolfe in public benefits. Vance January, on case and will attempt precert approval from [facility] at facility of patient/ daughter's choice. Maksim, hospitalist, said she is medically ready for ECF placement.

## 2021-07-27 NOTE — CARE COORDINATION
7/27/21, 12:52 PM EDT    DISCHARGE PLANNING EVALUATION      Spoke with Robbie Liu about discharge plan. She would like to go back to an ecf, if insurance will approve. Her first choice is [facility], other options are [facility], [facility], [facility] or [facility]. Referral made to [facility], facility will review. [facility] is out of network. Referral made to [facility], [facility] is reviewing.

## 2021-07-27 NOTE — PROGRESS NOTES
Hospitalist Progress Note    Patient:  Radha Holcomb      Unit/Bed:7K-05/005-A    YOB: 1940    MRN: [id number]       Acct: [de-identified]     PCP: Pepe Dooley MD    Date of Admission: 7/24/2021    Assessment/Plan:    1. Deconditioning, debility and generalized weakness following surgery with complication. Recently discharged from [facility]. Unable to care for herself at home. Will need placement back. SW/CM following. [facility] to precert back, if not patient understands she will have to liquidate assests to pay. PT/OT. 2. Hypokalemia, resolved with replacement. 3. Osteomyelitis L2-L3 status post laminectomy for discitis. Was on Rocephin. Some confusion per family on whether this was to continue after discharge. Rocephin has been resumed. Awaiting ID recs for continuing. Follows with Dr. Terry Rae. .Neuro surgery seen. MRI of the back stable. 4. Acute on chronic back pain due to #3. Improved. 5. Type 2 diabetes. BS trend has been acceptable since admission. Humalog scale. Lantus. Trend glucose ac/hs. Hypoglycemic protocol. Carb controlled meals. 6. Diastolic CHF, chronic. No overt signs of decompensation. BB/ACE Daily weights. Strict intake and output. Low sodium diet. 7. Primary hypertension. Home regimen resumed. 8. Valvular disease. Known moderate tricuspid regurgitation with mild aortic stenosis continue to monitor. 9. AFIB. Eliquis  10. Normocytic anemia. Hbg 9.1. Monitor. Discussed case with CM. Financial services to see. Attempt to precert, may need to go back as self pay. Chief Complaint: Inability to care for herself. Hospital Course:     Ha Salazar a [de-identified] y.o. female who presents to the emergency department for evaluation of bilateral leg weakness.  The patient states after her morning routine yesterday morning she noticed increased weakness when walking and is now unable to walk with her walker.  She underwent low back surgery a month ago and states that since the times per day    cefTRIAXone (ROCEPHIN) IV  1,000 mg Intravenous Q24H    furosemide  20 mg Oral Daily     PRN Meds: glucose, dextrose, glucagon (rDNA), dextrose, sodium chloride flush, sodium chloride, ondansetron **OR** ondansetron, polyethylene glycol, acetaminophen **OR** acetaminophen, potassium chloride **OR** potassium alternative oral replacement **OR** potassium chloride, magnesium sulfate, HYDROcodone 5 mg - acetaminophen      Intake/Output Summary (Last 24 hours) at 7/27/2021 1027  Last data filed at 7/27/2021 0345  Gross per 24 hour   Intake 737.1 ml   Output 325 ml   Net 412.1 ml       Diet:  ADULT DIET; Regular; 4 carb choices (60 gm/meal); Low Sodium (2 gm)    Exam:  /60   Pulse 60   Temp 97.7 °F (36.5 °C) (Oral)   Resp 16   Ht 5' 4\" (1.626 m)   Wt 171 lb (77.6 kg)   SpO2 97%   BMI 29.35 kg/m²     General appearance: No apparent distress, appears stated age and cooperative. HEENT: Pupils equal, round, and reactive to light. Conjunctivae/corneas clear. Neck: Supple, with full range of motion. No jugular venous distention. Trachea midline. Respiratory:  Normal respiratory effort. Clear to auscultation, bilaterally without Rales/Wheezes/Rhonchi. Cardiovascular: Regular rate and rhythm with normal S1/S2 + murmur, rubs or gallops. Abdomen: Soft, non-tender, non-distended with normal bowel sounds. Musculoskeletal: passive and active ROM x 4 extremities. Skin: Skin color, texture, turgor normal.  Surgical incision well healing to lumbar region. Neurologic:  Neurovascularly intact without any focal sensory/motor deficits.  Cranial nerves: II-XII intact, grossly non-focal.  Psychiatric: Alert and oriented, thought content appropriate  Capillary Refill: Brisk,< 3 seconds   Peripheral Pulses: +2 palpable, equal bilaterally       Labs:   Recent Labs     07/24/21  1414 07/25/21  0706   WBC 6.1 5.2   HGB 10.0* 9.1*   HCT 33.4* 30.9*    352     Recent Labs     07/24/21  1414 07/25/21  0706    137   K 3.2* 3.7   CL 97* 99   CO2 27 27   BUN 10 10   CREATININE 0.4 0.5   CALCIUM 9.6 9.4     Recent Labs     07/25/21  0706   AST 10   ALT 6*   BILITOT 0.3   ALKPHOS 104     No results for input(s): INR in the last 72 hours. No results for input(s): [place] in the last 72 hours. No results for input(s): PROCAL in the last 72 hours. Microbiology:      Urinalysis:      Lab Results   Component Value Date    NITRU Positive 06/29/2021    WBCUA 21-50 06/29/2021    BACTERIA 1+ 06/29/2021    RBCUA 51-75 06/29/2021    BLOODU TRACE 06/13/2014    GLUCOSEU Negative 06/29/2021       Radiology:  XR CHEST 1 VIEW    Result Date: 7/25/2021  PROCEDURE: XR CHEST 1 VIEW CLINICAL INFORMATION: picc line placement COMPARISON: No prior study. TECHNIQUE:  AP chest single view  FINDINGS: There is a right upper extremity PICC present with the tip overlying the cavoatrial junction. The heart size is normal. No focal consolidation, pleural effusion or pneumothorax is seen. Left shoulder joint arthrosis is noted. 1. Right upper extremity PICC tip projects over the cavoatrial junction. **This report has been created using voice recognition software. It may contain minor errors which are inherent in voice recognition technology. ** Final report electronically signed by Dr. Renan Langley on 7/25/2021 10:30 AM           Code Status: DNR-CCA          Electronically signed by BERNDAN Emanuel CNP on 7/27/2021 at 10:27 AM

## 2021-07-27 NOTE — PLAN OF CARE
Problem: DISCHARGE BARRIERS  Goal: Patient's continuum of care needs are met  Outcome: Met This Shift  Note: Pt care needs met this shift. Precert for ecf. Problem: Pain:  Goal: Pain level will decrease  Description: Pain level will decrease  7/27/2021 0021 by Roberta Santillan RN  Outcome: Ongoing  Note: Pt report pain at  7/10 on scale. Pt states oral medication and repositioning are helping to achieve pain goal of no pain. Problem: Falls - Risk of:  Goal: Will remain free from falls  Description: Will remain free from falls  7/27/2021 0021 by Roberta Santillan RN  Outcome: Ongoing  Note: Pt free from fall. Fall prevention measures in place. Pt able to use a call light to request assistance. Problem: Skin Integrity:  Goal: Will show no infection signs and symptoms  Description: Will show no infection signs and symptoms  7/27/2021 0031 by Roberta Santillan RN  Outcome: Ongoing  Note: No s/s of infection noted. Pt getting iv antibiotic rocephin      Problem: Skin Integrity:  Goal: Will show no infection signs and symptoms  Description: Will show no infection signs and symptoms  7/27/2021 0021 by Roberta Santillan RN  Outcome: Ongoing     Problem: Skin Integrity:  Goal: Absence of new skin breakdown  Description: Absence of new skin breakdown  7/27/2021 0031 by Roberta Santillan RN  Outcome: Ongoing  Note: Pt able to repositioned self in bed. Care plan reviewed with patient. Patient verbalizes understanding of the plan of care and contribute to goal setting.

## 2021-07-27 NOTE — PLAN OF CARE
Problem: Pain:  Goal: Pain level will decrease  Description: Pain level will decrease  7/27/2021 1056 by Nathen Anand RN  Outcome: Ongoing  7/27/2021 0021 by Connie Mccray RN  Outcome: Ongoing  Note: Pt report pain at  7/10 on scale. Pt states oral medication and repositioning are helping to achieve pain goal of no pain. Goal: Control of acute pain  Description: Control of acute pain  7/27/2021 0021 by Connie Mccray RN  Outcome: Completed  Goal: Control of chronic pain  Description: Control of chronic pain  7/27/2021 0021 by Connie Mccray RN  Outcome: Completed     Problem: Falls - Risk of:  Goal: Will remain free from falls  Description: Will remain free from falls  7/27/2021 1056 by Nathen Anand RN  Outcome: Ongoing  7/27/2021 0021 by Connie Mccray RN  Outcome: Ongoing  Note: Pt free from fall. Fall prevention measures in place. Pt able to use a call light to request assistance. Goal: Absence of physical injury  Description: Absence of physical injury  7/27/2021 0021 by Connie Mccray RN  Outcome: Completed     Problem: Skin Integrity:  Goal: Will show no infection signs and symptoms  Description: Will show no infection signs and symptoms  7/27/2021 1056 by Nathen Anand RN  Outcome: Ongoing  7/27/2021 0031 by Connie Mccray RN  Outcome: Ongoing  Note: No s/s of infection noted. Pt getting iv antibiotic rocephin   7/27/2021 0021 by Connie Mccray RN  Outcome: Ongoing  Goal: Absence of new skin breakdown  Description: Absence of new skin breakdown  7/27/2021 1056 by Nathen Anand RN  Outcome: Ongoing  7/27/2021 0031 by Connie Mccray RN  Outcome: Ongoing  Note: Pt able to repositioned self in bed. Problem: DISCHARGE BARRIERS  Goal: Patient's continuum of care needs are met  7/27/2021 1056 by Nathen Anand RN  Outcome: Ongoing  7/27/2021 0031 by Connie Mccray RN  Outcome: Met This Shift  Note: Pt care needs met this shift. Precert for ecf.

## 2021-07-28 PROBLEM — E44.0 MODERATE MALNUTRITION (HCC): Status: ACTIVE | Noted: 2021-07-28

## 2021-07-28 LAB
ANION GAP SERPL CALCULATED.3IONS-SCNC: 9 MEQ/L (ref 8–16)
BASOPHILS # BLD: 0.9 %
BASOPHILS ABSOLUTE: 0 THOU/MM3 (ref 0–0.1)
BUN BLDV-MCNC: 11 MG/DL (ref 7–22)
CALCIUM SERPL-MCNC: 9.5 MG/DL (ref 8.5–10.5)
CHLORIDE BLD-SCNC: 100 MEQ/L (ref 98–111)
CO2: 31 MEQ/L (ref 23–33)
CREAT SERPL-MCNC: 0.5 MG/DL (ref 0.4–1.2)
EOSINOPHIL # BLD: 2.9 %
EOSINOPHILS ABSOLUTE: 0.2 THOU/MM3 (ref 0–0.4)
ERYTHROCYTE [DISTWIDTH] IN BLOOD BY AUTOMATED COUNT: 16.2 % (ref 11.5–14.5)
ERYTHROCYTE [DISTWIDTH] IN BLOOD BY AUTOMATED COUNT: 52.9 FL (ref 35–45)
GFR SERPL CREATININE-BSD FRML MDRD: > 90 ML/MIN/1.73M2
GLUCOSE BLD-MCNC: 101 MG/DL (ref 70–108)
GLUCOSE BLD-MCNC: 154 MG/DL (ref 70–108)
GLUCOSE BLD-MCNC: 205 MG/DL (ref 70–108)
GLUCOSE BLD-MCNC: 65 MG/DL (ref 70–108)
GLUCOSE BLD-MCNC: 67 MG/DL (ref 70–108)
HCT VFR BLD CALC: 31.1 % (ref 37–47)
HEMOGLOBIN: 9.4 GM/DL (ref 12–16)
IMMATURE GRANS (ABS): 0.02 THOU/MM3 (ref 0–0.07)
IMMATURE GRANULOCYTES: 0.4 %
LYMPHOCYTES # BLD: 26.5 %
LYMPHOCYTES ABSOLUTE: 1.5 THOU/MM3 (ref 1–4.8)
MCH RBC QN AUTO: 26.9 PG (ref 26–33)
MCHC RBC AUTO-ENTMCNC: 30.2 GM/DL (ref 32.2–35.5)
MCV RBC AUTO: 89.1 FL (ref 81–99)
MONOCYTES # BLD: 8.4 %
MONOCYTES ABSOLUTE: 0.5 THOU/MM3 (ref 0.4–1.3)
NUCLEATED RED BLOOD CELLS: 0 /100 WBC
PLATELET # BLD: 305 THOU/MM3 (ref 130–400)
PMV BLD AUTO: 9.3 FL (ref 9.4–12.4)
POTASSIUM SERPL-SCNC: 3.8 MEQ/L (ref 3.5–5.2)
RBC # BLD: 3.49 MILL/MM3 (ref 4.2–5.4)
SEG NEUTROPHILS: 60.9 %
SEGMENTED NEUTROPHILS ABSOLUTE COUNT: 3.3 THOU/MM3 (ref 1.8–7.7)
SODIUM BLD-SCNC: 140 MEQ/L (ref 135–145)
WBC # BLD: 5.5 THOU/MM3 (ref 4.8–10.8)

## 2021-07-28 PROCEDURE — 82948 REAGENT STRIP/BLOOD GLUCOSE: CPT

## 2021-07-28 PROCEDURE — 97166 OT EVAL MOD COMPLEX 45 MIN: CPT

## 2021-07-28 PROCEDURE — 97110 THERAPEUTIC EXERCISES: CPT

## 2021-07-28 PROCEDURE — 6370000000 HC RX 637 (ALT 250 FOR IP): Performed by: INTERNAL MEDICINE

## 2021-07-28 PROCEDURE — 80048 BASIC METABOLIC PNL TOTAL CA: CPT

## 2021-07-28 PROCEDURE — 97116 GAIT TRAINING THERAPY: CPT

## 2021-07-28 PROCEDURE — 36415 COLL VENOUS BLD VENIPUNCTURE: CPT

## 2021-07-28 PROCEDURE — 6360000002 HC RX W HCPCS: Performed by: INTERNAL MEDICINE

## 2021-07-28 PROCEDURE — 6370000000 HC RX 637 (ALT 250 FOR IP): Performed by: NURSE PRACTITIONER

## 2021-07-28 PROCEDURE — 1200000000 HC SEMI PRIVATE

## 2021-07-28 PROCEDURE — 2580000003 HC RX 258: Performed by: INTERNAL MEDICINE

## 2021-07-28 PROCEDURE — 99232 SBSQ HOSP IP/OBS MODERATE 35: CPT | Performed by: NURSE PRACTITIONER

## 2021-07-28 PROCEDURE — 97535 SELF CARE MNGMENT TRAINING: CPT

## 2021-07-28 PROCEDURE — 85025 COMPLETE CBC W/AUTO DIFF WBC: CPT

## 2021-07-28 RX ADMIN — APIXABAN 5 MG: 5 TABLET, FILM COATED ORAL at 09:08

## 2021-07-28 RX ADMIN — ATORVASTATIN CALCIUM 40 MG: 40 TABLET, FILM COATED ORAL at 20:28

## 2021-07-28 RX ADMIN — HYDROCODONE BITARTRATE AND ACETAMINOPHEN 1 TABLET: 5; 325 TABLET ORAL at 06:29

## 2021-07-28 RX ADMIN — POTASSIUM CHLORIDE 10 MEQ: 750 TABLET, EXTENDED RELEASE ORAL at 09:08

## 2021-07-28 RX ADMIN — CITALOPRAM 10 MG: 20 TABLET, FILM COATED ORAL at 09:07

## 2021-07-28 RX ADMIN — Medication 400 MG: at 09:07

## 2021-07-28 RX ADMIN — ISOSORBIDE MONONITRATE 30 MG: 30 TABLET ORAL at 09:07

## 2021-07-28 RX ADMIN — GABAPENTIN 100 MG: 100 CAPSULE ORAL at 23:18

## 2021-07-28 RX ADMIN — INSULIN GLARGINE 30 UNITS: 100 INJECTION, SOLUTION SUBCUTANEOUS at 23:20

## 2021-07-28 RX ADMIN — LABETALOL HYDROCHLORIDE 100 MG: 100 TABLET, FILM COATED ORAL at 09:07

## 2021-07-28 RX ADMIN — LABETALOL HYDROCHLORIDE 100 MG: 100 TABLET, FILM COATED ORAL at 20:27

## 2021-07-28 RX ADMIN — CYCLOBENZAPRINE 10 MG: 10 TABLET, FILM COATED ORAL at 20:28

## 2021-07-28 RX ADMIN — RAMIPRIL 20 MG: 10 CAPSULE ORAL at 09:07

## 2021-07-28 RX ADMIN — CEFTRIAXONE SODIUM 1000 MG: 1 INJECTION, POWDER, FOR SOLUTION INTRAMUSCULAR; INTRAVENOUS at 18:11

## 2021-07-28 RX ADMIN — AMLODIPINE BESYLATE 5 MG: 5 TABLET ORAL at 09:08

## 2021-07-28 RX ADMIN — BUSPIRONE HYDROCHLORIDE 5 MG: 5 TABLET ORAL at 20:27

## 2021-07-28 RX ADMIN — PANTOPRAZOLE SODIUM 40 MG: 40 TABLET, DELAYED RELEASE ORAL at 05:58

## 2021-07-28 RX ADMIN — Medication 15 G: at 06:23

## 2021-07-28 RX ADMIN — BUSPIRONE HYDROCHLORIDE 5 MG: 5 TABLET ORAL at 09:13

## 2021-07-28 RX ADMIN — SODIUM CHLORIDE, PRESERVATIVE FREE 10 ML: 5 INJECTION INTRAVENOUS at 18:11

## 2021-07-28 RX ADMIN — METOPROLOL TARTRATE 75 MG: 25 TABLET, FILM COATED ORAL at 09:07

## 2021-07-28 RX ADMIN — METOPROLOL TARTRATE 75 MG: 25 TABLET, FILM COATED ORAL at 20:27

## 2021-07-28 RX ADMIN — DOCUSATE SODIUM 100 MG: 100 CAPSULE ORAL at 20:29

## 2021-07-28 RX ADMIN — GABAPENTIN 100 MG: 100 CAPSULE ORAL at 09:07

## 2021-07-28 RX ADMIN — SODIUM CHLORIDE, PRESERVATIVE FREE 10 ML: 5 INJECTION INTRAVENOUS at 09:08

## 2021-07-28 RX ADMIN — SODIUM CHLORIDE, PRESERVATIVE FREE 10 ML: 5 INJECTION INTRAVENOUS at 21:27

## 2021-07-28 RX ADMIN — DOCUSATE SODIUM 100 MG: 100 CAPSULE ORAL at 09:06

## 2021-07-28 RX ADMIN — APIXABAN 5 MG: 5 TABLET, FILM COATED ORAL at 20:28

## 2021-07-28 RX ADMIN — CYCLOBENZAPRINE 10 MG: 10 TABLET, FILM COATED ORAL at 09:06

## 2021-07-28 RX ADMIN — FUROSEMIDE 20 MG: 40 TABLET ORAL at 09:14

## 2021-07-28 RX ADMIN — AMIODARONE HYDROCHLORIDE 200 MG: 200 TABLET ORAL at 09:07

## 2021-07-28 RX ADMIN — HYDROCODONE BITARTRATE AND ACETAMINOPHEN 1 TABLET: 5; 325 TABLET ORAL at 12:27

## 2021-07-28 RX ADMIN — GABAPENTIN 100 MG: 100 CAPSULE ORAL at 18:11

## 2021-07-28 ASSESSMENT — PAIN DESCRIPTION - LOCATION: LOCATION: FLANK

## 2021-07-28 ASSESSMENT — PAIN DESCRIPTION - PROGRESSION
CLINICAL_PROGRESSION: GRADUALLY WORSENING

## 2021-07-28 ASSESSMENT — PAIN DESCRIPTION - FREQUENCY: FREQUENCY: CONTINUOUS

## 2021-07-28 ASSESSMENT — PAIN DESCRIPTION - ORIENTATION: ORIENTATION: LEFT

## 2021-07-28 ASSESSMENT — PAIN DESCRIPTION - DESCRIPTORS: DESCRIPTORS: SHARP;SHOOTING

## 2021-07-28 ASSESSMENT — PAIN SCALES - GENERAL
PAINLEVEL_OUTOF10: 7
PAINLEVEL_OUTOF10: 0
PAINLEVEL_OUTOF10: 10
PAINLEVEL_OUTOF10: 10
PAINLEVEL_OUTOF10: 6

## 2021-07-28 ASSESSMENT — PAIN DESCRIPTION - ONSET: ONSET: ON-GOING

## 2021-07-28 ASSESSMENT — PAIN DESCRIPTION - PAIN TYPE
TYPE: CHRONIC PAIN
TYPE: ACUTE PAIN

## 2021-07-28 ASSESSMENT — PAIN - FUNCTIONAL ASSESSMENT: PAIN_FUNCTIONAL_ASSESSMENT: ACTIVITIES ARE NOT PREVENTED

## 2021-07-28 NOTE — PROGRESS NOTES
[address] [id number]  INPATIENT PHYSICAL THERAPY  DAILY NOTE  [facility] ORTHOPEDICS 7K - 7K-05/005-A      Time In: 922  Time Out: 913  Timed Code Treatment Minutes: 29 Minutes  Minutes: 29          Date: 2021  Patient Name: Fortunato Francis,  Gender:  female        MRN: [id number]  : 1940  ([de-identified] y.o.)     Referring Practitioner: Richmond Squires MD  Diagnosis: UTI  Additional Pertinent Hx: Per HPI: Nadia Concepcion is a [de-identified] y.o. female who presents to the emergency department for evaluation of bilateral leg weakness. The patient states after her morning routine yesterday morning she noticed increased weakness when walking and is now unable to walk with her walker. She underwent low back surgery a month ago and states that since the surgery she has been having trouble walking and has had to ambulate using a walker. She also reports new paresthesias bilaterally in both lower extremities. She denies chest pain, shortness of breath, diaphoresis, diarrhea, or constipation. \"     Prior Level of Function:  Lives With: Daughter (daughter she lives with has hip and knee injuries--has difficulty getting around herself)  Type of Home: Mobile home  Home Layout: One level  Home Access: Stairs to enter with rails  Entrance Stairs - Number of Steps: 4  Home Equipment: Rolling walker, 4 wheeled walker, Lynn Lias (daughter is currently using w/c; pt has been using walker since surgery, but did not use AD PTA)        Ambulation Assistance: Needs assistance (pt states on her last day at [facility] she was walking independently with walker, but that was the first time, and pt got much weaker following that)  Transfer Assistance: Independent  Active : No  Additional Comments: pt states she left the SNF [facility]) last Friday and felt she was not ready to go home yet    Restrictions/Precautions:  Restrictions/Precautions: General Precautions, Fall Risk  Position Activity Restriction  Spinal Precautions: No Bending, No Lifting, No Twisting  Other position/activity restrictions: hx lumbar laminectomy L2-L3 and osteomyelitis tx with abx       SUBJECTIVE: pt in bed on arrival she initially refused therapy however with encouragement and education she did agree to get up     PAIN: 8/10: at left side - nursing aware and did place ice to left side     Vitals: Nursing just checked vitals and all WNL    OBJECTIVE:  Bed Mobility:  Rolling to Right: Minimal Assistance   Supine to Sit: Minimal Assistance  Scooting: Stand By Assistance  ** moving slow and guarded  Transfers:  Sit to Stand: [employer], cues for hand placement   Stand to Sit:Contact [employer] Assistance    Ambulation:  Contact Guard Assistance  Distance: 35x1  Surface: Level Tile  Device:Rolling Walker  Gait Deviations:  Slow gayatri with decreased step length at diego LEs and decreased heelstrike more so at left vs right- pt c/o of left side pain with gait limiting further mobility   Exercise:  Patient was guided in 1 set(s) 10 reps of exercise to both lower extremities. Ankle pumps, Glut sets, Quad sets, Heelslides, Hip abduction/adduction and Long arc quads. Exercises were completed for increased independence with functional mobility. Functional Outcome Measures: Completed  -PAC Inpatient Mobility Raw Score : 16  AM-PAC Inpatient T-Scale Score : 40.78    ASSESSMENT:  Assessment: Pt c/o of left side pain this date which did limit increased mobility. Pt would benefit from cont skilled therapy to work on strength, balance endurance and increased independence with functional mobility   Activity Tolerance:  Patient tolerance of  treatment: fair.  Limited due to pain      Equipment Recommendations:Equipment Needed: No  Discharge Recommendations:    Subacute/Skilled Nursing Facility    Plan: Times per week: 6 x O  Times per day: Daily  Current Treatment Recommendations: Strengthening, Neuromuscular Re-education, Home Exercise Program, Safety Education & Training, Balance Training, Endurance Training, Patient/Caregiver Education & Training, Functional Mobility Training, Transfer Training, Gait Training, Stair training    Patient Education  Patient Education: Plan of Care    Goals:  Patient goals : go somewhere for therapy, find out what is causing leg weakness  Short term goals  Time Frame for Short term goals: by hospital discharge  Short term goal 1: Supine to/from sit, using log roll, with modified independence for ease of transfers at discharge site. Short term goal 2: Sit to/from stand with modified independence for ease of transfers at discharge site. Short term goal 3: Improve 5x STS time to less than 14.8 seconds to reflect age appropriate norms, indicating improved functional strength and endurance. Short term goal 4: Ambulate 48' with RW and modified independence for home distance ambulation. Short term goal 5: Ascend/descend 4 steps with BHR with modified independence for entry into home. Long term goals  Time Frame for Long term goals : N/A due to short ELOS. Following session, patient left in safe position with all fall risk precautions in place.

## 2021-07-28 NOTE — FLOWSHEET NOTE
Spoke with Dr. Mariela Bailon. Jone Rodrigo is a patient of Dr. Martin Johnson therefore he needs consulted on discharge.

## 2021-07-28 NOTE — PROGRESS NOTES
Adal Whittington 61  INPATIENT OCCUPATIONAL THERAPY  [facility] ORTHOPEDICS 7K  EVALUATION    Time:    Time In: 4678  Time Out: 1410  Timed Code Treatment Minutes: 24 Minutes  Minutes: 35          Date: 2021  Patient Name: Rich Umana,   Gender: female      MRN: [id number]  : 1940  ([de-identified] y.o.)  Referring Practitioner: Dr. Jovanni Garcia. Taj  Diagnosis: UTI  Additional Pertinent Hx: [de-identified] y.o. female who presents to the emergency department for evaluation of bilateral leg weakness. The patient states after her morning routine yesterday morning she noticed increased weakness when walking and is now unable to walk with her walker. She underwent low back surgery a month ago and states that since the surgery she has been having trouble walking and has had to ambulate using a walker. She also reports new paresthesias bilaterally in both lower extremities. She denies chest pain, shortness of breath, diaphoresis, diarrhea, or constipation. \"    Restrictions/Precautions:  Restrictions/Precautions: General Precautions, Fall Risk  Position Activity Restriction  Spinal Precautions: No Bending, No Lifting, No Twisting  Other position/activity restrictions: hx lumbar laminectomy L2-L3 and osteomyelitis tx with abx    Subjective  Chart Reviewed: Yes, Orders, Progress Notes, History and Physical  Patient assessed for rehabilitation services?: Yes    Subjective: Ptlying in bed stating she is feeling better. Pt agreeable to OT session.     Pain:  Pain Assessment  Patient Currently in Pain: Denies    Vitals: Vitals not assessed per clinical judgement, see nursing flowsheet    Social/Functional History:  Lives With: Daughter (daughter lives with pt)  Type of Home: Mobile home  Home Layout: One level  Home Access: Stairs to enter with rails (platform steps wher she can put her walker up on)  Entrance Stairs - Number of Steps: 4  Home Equipment: Rolling walker, 4 wheeled walker, [place] Shower/Tub: Walk-in shower, Shower chair with back  Bathroom Toilet: Handicap height       ADL Assistance: Independent  Ambulation Assistance: Needs assistance (pt states on her last day at [facility] she was walking independently with walker, but that was the first time, and pt got much weaker following that)  Transfer Assistance: Independent    Active : No  Patient's  Info: pt has not driven since surgery     Additional Comments: pt states she left the SNF [facility]) last Friday and felt she was not ready to go home yet. Pt stating when she left SNF, she was able to complete her ADL tasks indep. Pt stating she was a \"nervous wreck\" going home d/t not feeling ready and the need to change her PICC line dressing everyday. [facility] but never had a chance to initiate d/t being home less than 24 hours. VISION:WNL    HEARING:  WNL    COGNITION: Decreased Safety Awareness    RANGE OF MOTION:  Right Upper Extremity: WNL  Left Upper Extremity:  pt wth decreased AROM of left shoulder with pt having idfficulty reachng to shoulder height. Pt compensates with scapula during movements. Pt statiung since she fell last her shoulder has been bothering her    STRENGTH:  Bilateral Upper Extremity: bilateral UE general weakness and deconditioning throughout     SENSATION:   WFL    ADL:   Grooming: Contact Guard Assistance. to wash hands standing at sink with pt leaning heavily on sink. Pt encouraged to sit to finish completing oral care. Lower Extremity Dressing: Maximum Assistance. donning socks  Toileting: Maximum Assistance. for hygiene  Toilet Transfer: Minimal Assistance. from standard toilet with education on safety for hand placement. BALANCE:  Sitting Balance:  Stand By Assistance. seated EOB for period of time with no complaints  Standing Balance: Minimal Assistance. to [facility] with bilateral UE support.  Pt unable to tolerate standing long to complete her oral caretasks    BED MOBILITY:  Supine to Sit: Stand By Assistance     Sit to Supine: Maximum Assistance at LEs    TRANSFERS:  Sit to Stand:  Minimal Assistance. from EOB from EOB and chair with no arms. education on safety   Stand to Sit: Contact Guard Assistance. onto EOB and chair with no arms    FUNCTIONAL MOBILITY:  Assistive Device: Rolling Walker  Assist Level:  Contact Guard Assistance. To min A at times  Distance: To and from bathroom and into hallway  Pt with slow pace but no LOB during. Pt rquiring seated rest break when in halls and was unable to ambulate back form bathroom. Pt stating it felt like her legs were \"Jelly\" and not going ot hold her up       Activity Tolerance:  Patient tolerance of  treatment: fair. icnreased fatigue throughout requiring increased time and need for rest breaks       Assessment:  Assessment: Pt decreased ability to complete her ADL tasks especially LB tasks d/t deficits. Pt would benefit from further skilled OT Services to increase her activity [place] and strength to be more indep with ADL tasks. Performance deficits / Impairments: Decreased endurance, Decreased functional mobility , Decreased ADL status, Decreased balance, Decreased strength, Decreased safe awareness  Prognosis: Fair  REQUIRES OT FOLLOW UP: Yes  Decision Making: Medium Complexity    Treatment Initiated: Treatment and education initiated within context of evaluation. Evaluation time included review of current medical information, gathering information related to past medical, social and functional history, completion of standardized testing, formal and informal observation of tasks, assessment of data and development of plan of care and goals. Treatment time included skilled education and facilitation of tasks to increase safety and independence with ADL's for improved functional independence and quality of life.     Discharge Recommendations:  [address], Patient would benefit from continued therapy after discharge    Patient Education:  OT Education: OT Role, Plan of Care  Patient Education: safety with transfers    Equipment Recommendations: Other: continue to assess    Plan:  Times per week: 5x  Current Treatment Recommendations: Strengthening, Patient/Caregiver Education & Training, Balance Training, Endurance Training, Safety Education & Training, Self-Care / ADL. See long-term goal time frame for expected duration of plan of care. If no long-term goals established, a short length of stay is anticipated. Goals:  Patient goals : get stronger with more rehab prior to returning home  Short term goals  Time Frame for Short term goals: by discharge  Short term goal 1: Pt to complete LB dressign tasks using LHAE as needed with min A  Short term goal 2: Pt to increase standing tolerance to be able to complete grooming tasks at sink with CGA and no LOB during  Short term goal 3: Pt to complete toileting and transfer iwth CGA and no cues for safety         Following session, patient left in safe position with all fall risk precautions in place.

## 2021-07-28 NOTE — PROGRESS NOTES
Comprehensive Nutrition Assessment    Type and Reason for Visit:  Initial, Positive Nutrition Screen (pt request ONS, reports recent weight loss)    Nutrition Recommendations/Plan:   Pt request 4 Glucerna per day. Activa tid. Continue glucose checks & recommend regular  A1c checks ( last in system 3.11.21 8.6%). Note glucose 65 today, last check recorded 154. Continue weight checks. Nutrition Assessment:    Pt. moderately malnourished AEB criteria as listed below. At risk for further nutrition compromise r/t admitted with bilateral leg weakness, UTI and underlying medical condition (pt reports back OR in may 2021, DM, HTN & Afib). Nutrition recommendations/interventions as per above. Malnutrition Assessment:  Malnutrition Status: Moderate malnutrition    Context:  Acute Illness     Findings of the 6 clinical characteristics of malnutrition:  Energy Intake:  1 - 75% or less of estimated energy requirements for 7 or more days  Weight Loss:   (11.3 % weight loss in 3 months)     Body Fat Loss:  No significant body fat loss     Muscle Mass Loss:  No significant muscle mass loss    Fluid Accumulation:  No significant fluid accumulation     Strength:  Not Performed    Estimated Daily Nutrient Needs:  Energy (kcal):  ~4256-6632 ( 20-22); Weight Used for Energy Requirements:  Admission     Protein (g):  82 grams ( 1.5); Weight Used for Protein Requirements:  Ideal        Fluid (ml/day):  per Dr;         Nutrition Related Findings:     Pt seen, reports did not eat lunch well, did not like sandwich, request 4 Glucerna shakes daily ( no chocolate), recent weight loss , UBW ~190# & has not had bm lately& they gave her med for . Last bm recorded 7/25. 3/11 A1c 8.6%. Glucose today 65, 154, Hgb 9.4. meds include ATB, lasix, humalog, lantus,   Wounds:   (pt reports back OR in May & she is healed. pt reports no open skin)       Current Nutrition Therapies:    ADULT DIET; Regular; 4 carb choices (60 gm/meal);  Low Sodium (2 gm)  Adult Oral Nutrition Supplement; Diabetic Oral Supplement  Adult Oral Nutrition Supplement; Other Oral Supplement; activa tid with meals    Anthropometric Measures:  · Height: 5' 4\" (162.6 cm)  · Current Body Weight: 171 lb 1.2 oz (77.6 kg) (7/27 no edema)   · Admission Body Weight: 171 lb 1.2 oz (77.6 kg) (7/27 no edema)    · Usual Body Weight: 193 lb (87.5 kg) (per EMR 5/15/21. Pt reports UBW ~190#)     · Ideal Body Weight: 120 lbs; % Ideal Body Weight     · BMI: 29.4   · BMI Categories:    overweight     Nutrition Diagnosis:   · Moderate malnutrition related to inadequate protein-energy intake as evidenced by  (11.3% weight loss in 3 months.  less than 75% projected energy needs for greater than 7 days)      Nutrition Interventions:   Food and/or Nutrient Delivery:  Start Oral Nutrition Supplement  Nutrition Education/Counseling:  Education initiated   Coordination of Nutrition Care:  No recommendation at this time    Goals:  75% or more CHO controlled healthy diet during LOS       Nutrition Monitoring and Evaluation:   Behavioral-Environmental Outcomes:  None Identified   Food/Nutrient Intake Outcomes:  Enteral Nutrition Intake/Tolerance  Physical Signs/Symptoms Outcomes:  Biochemical Data, Chewing or Swallowing, Constipation, Fluid Status or Edema, Hemodynamic Status, Nutrition Focused Physical Findings, Skin, Weight     Discharge Planning:      too soon    Electronically signed by Stanley Wagner RD, LD on 7/28/21 at 3:43 PM EDT    Contact: [phone]

## 2021-07-28 NOTE — PLAN OF CARE
Problem: Nutrition  Goal: Optimal nutrition therapy  Outcome: Ongoing   Nutrition Problem #1: Moderate malnutrition  Intervention: Food and/or Nutrient Delivery: Start Oral Nutrition Supplement  Nutritional Goals: 75% or more CHO controlled healthy diet during LOS

## 2021-07-28 NOTE — PROGRESS NOTES
Hospitalist Progress Note    Patient:  Radha Holcomb      Unit/Bed:7K-05/005-A    YOB: 1940    MRN: [id number]       Acct: [de-identified]     PCP: Pepe Dooley MD    Date of Admission: 7/24/2021    Assessment/Plan:    1. Physical deconditioning/debility--likely secondary to age and recent surgery; PT/OT, social work is assisting with placement  2. Hypokalemia--resolved with replacement  3. Osteomyelitis L2-L3 status post laminectomy--appreciate neurosurgical input; Rocephin~we will reconsult infectious disease for input~appears patient to be on Rocephin for total of 6 weeks  4. Diabetes mellitus type 2, uncontrolled--blood sugar was 65 this morning however improved nicely; on Lantus along with low-dose sliding scale, patient definitely needs to eat an evening snack  5. Essential hypertension, uncontrolled--on Norvasc, Lopressor, Lasix Imdur, Altace, monitor  6. Diastolic heart failure--on Lopressor, Lasix  7. VHD with moderate tricuspid regurgitation/mild aortic stenosis  8. Atrial fibrillation--on Eliquis, amiodarone  9. Normocytic anemia    Expected discharge date: Pending placement and clinical progress    Disposition:    [] Home       [] TCU       [] Rehab       [] Psych       [x] SNF       [] [place]       [] Other-    Chief Complaint: Inability to care for herself    Hospital Course: Per progress note dated 7/27: Bianka mcwilliams [de-identified] y.o. female who presents to the emergency department for evaluation of bilateral leg weakness.  The patient states after her morning routine yesterday morning she noticed increased weakness when walking and is now unable to walk with her walker.  She underwent low back surgery a month ago and states that since the surgery she has been having trouble walking and has had to ambulate using a walker.  She also reports new paresthesias bilaterally in both lower extremities.  She denies chest pain, shortness of breath, diaphoresis, diarrhea, or constipation. The patient has no other acute complaints at this time. MRI lumbar spine 2 weeks agp showed discitis and   osteomyelitis at L2-L3, laminectomy L2-3 with possible abscess posteriorly external to the   spinal canal, no insignificant interval changes noted overall. Dr Sea [place] and DR Kayode Avilez   consulted who both agree patient does not need any surgical intervention at this time. Patient was to discharge previousy on 6 wks of antiobiotic however this was not done at   [facility]. Patient has PICC. Patient is feeling much improved, labs stable, afebrile. Patient   working with therapy. Dr Kayode Avilez wrote for Rx for Ceftriaxone and this will be continued for   6 wks.  Patient stable for d/c to SNF,\"    7/28--> hemodynamically stable, laboratory studies are stable except hemoglobin at 9.4 and this morning her glucose was 67    Subjective (past 24 hours): Up in the chair and she was assisted back to bed with her walker and gait belt with 2 staff members; complains of low back pain      Medications:  Reviewed    Infusion Medications    dextrose      sodium chloride       Scheduled Medications    pantoprazole  40 mg Oral QAM AC    insulin lispro  0-6 Units Subcutaneous TID WC    insulin lispro  0-3 Units Subcutaneous Nightly    busPIRone  5 mg Oral BID    citalopram  10 mg Oral Daily    docusate sodium  100 mg Oral BID    gabapentin  100 mg Oral TID    amiodarone  200 mg Oral Daily    amLODIPine  5 mg Oral Daily    apixaban  5 mg Oral BID    atorvastatin  40 mg Oral Nightly    cyclobenzaprine  10 mg Oral BID    isosorbide mononitrate  30 mg Oral Daily    labetalol  100 mg Oral BID    insulin glargine  30 Units Subcutaneous Nightly    magnesium oxide  400 mg Oral Daily    metoprolol tartrate  75 mg Oral BID    potassium chloride  10 mEq Oral Q MWF    ramipril  20 mg Oral Daily    sodium chloride flush  10 mL Intravenous 2 times per day    cefTRIAXone (ROCEPHIN) IV  1,000 mg Intravenous Q24H    furosemide  20 mg Oral Daily     PRN Meds: glucose, dextrose, glucagon (rDNA), dextrose, sodium chloride flush, sodium chloride, ondansetron **OR** ondansetron, polyethylene glycol, acetaminophen **OR** acetaminophen, potassium chloride **OR** potassium alternative oral replacement **OR** potassium chloride, magnesium sulfate, HYDROcodone 5 mg - acetaminophen      Intake/Output Summary (Last 24 hours) at 7/28/2021 3413  Last data filed at 7/27/2021 2137  Gross per 24 hour   Intake 657.5 ml   Output --   Net 657.5 ml       Diet:  ADULT DIET; Regular; 4 carb choices (60 gm/meal); Low Sodium (2 gm)    Exam:  /63   Pulse 55   Temp 98.6 °F (37 °C) (Oral)   Resp 18   Ht 5' 4\" (1.626 m)   Wt 171 lb (77.6 kg)   SpO2 98%   BMI 29.35 kg/m²     General appearance: No apparent distress, appears stated age and cooperative. HEENT: Pupils equal, round, and reactive to light. Conjunctivae/corneas clear. Neck: Supple, with full range of motion. No jugular venous distention. Trachea midline. Respiratory:  Normal respiratory effort. Clear to auscultation, bilaterally without Rales/Wheezes/Rhonchi. Cardiovascular: Regular rate and rhythm with normal S1/S2; (+) murmur noted. Abdomen: Soft, non-tender, non-distended with normal bowel sounds. Musculoskeletal: passive and active ROM x 4 extremities. Skin: Skin color, texture, turgor normal.    Neurologic:  Neurovascularly intact without any focal sensory/motor deficits.  Cranial nerves: II-XII intact, grossly non-focal.  Psychiatric: Alert and oriented, thought content appropriate  Capillary Refill: Brisk,< 3 seconds   Peripheral Pulses: +2 palpable, equal bilaterally       Labs:   Recent Labs     07/28/21  0630   WBC 5.5   HGB 9.4*   HCT 31.1*        Recent Labs     07/28/21  0630      K 3.8      CO2 31   BUN 11   CREATININE 0.5   CALCIUM 9.5     Microbiology:    None    Radiology:  MRI LUMBAR SPINE W WO CONTRAST    Result Date: 7/25/2021  PROCEDURE: MRI LUMBAR SPINE W WO CONTRAST CLINICAL INFORMATION: Lumbar diskitis; LE weakness. COMPARISON: Report of previous  MRI scan dated 6/29/2021. . TECHNIQUE: Sagittal and axial T1 and T2-weighted images were obtained to the lumbar spine. Postcontrast axial and sagittal T1-weighted images were also obtained. FINDINGS: There is abnormal signal intensity in approximately the L1 vertebral body,, superior aspect of the L2 vertebral body, T12-L1 and L1-2 disc spaces consistent with vertebral body osteomyelitis and disc space infection. There is possible previous instrumentation at L1-2. There is abnormal signal intensity enhancement the soft tissues dorsally at L1 and L2. The lumbar vertebral bodies are normally aligned. There is normal marrow signal throughout. There is no bone marrow edema. There are no compression fractures. No pars defects are noted. The discs have normal signal throughout. The visualized aspects of the distal spinal cord are normal. The nerve roots of the cauda equina and the tip of the conus are normal. There are no gross abnormalities in the distal thoracic spine. On the axial images, at T12-L1, there is mild canal and moderate bilateral from stenosis At L1-L2, there is mild canal and moderate to severe bilateral foraminal stenosis. At L2-3, there is mild to moderate canal and moderate bilateral foraminal stenosis. At L3-4, there is moderate canal and moderate to severe bilateral foraminal stenosis At L4-5, there is mild-to-moderate canal and moderate bilateral foraminal stenosis At L5-S1, there is mild canal and moderate bilateral foraminal stenosis. There is degenerative change involving the sacroiliac joints bilaterally. There is no abnormal enhancement. There is atrophy involving the iliacus, psoas and paraspinal muscles bilaterally. There is a left hip replacement.       1. Previous instrumentation at L1-2. 2. Abnormal signal intensity within the L1 vertebral body and superior aspect of the L2 vertebral body consistent with osteomyelitis. 3. Abnormal signal intensity within the T12-L1 and L1-2 disc spaces, consistent with disc space infection. 4. Abnormal signal intensity and enhancement in the soft tissues dorsally at L1 and L2 consistent with postoperative and possible inflammatory changes. 5. There is mild-to-moderate canal and moderate bilateral from stenosis at L2-3 and L4-5. 6. There is moderate canal and moderate to severe bilateral foraminal stenosis at L3-4. 7. There is mild canal and moderate severe bilateral foraminal stenosis at L1-2. 8. There is mild canal and moderate bilateral foraminal stenosis at T12-L1 and L5-S1. 9. There is degenerative change involving the sacroiliac joints bilaterally. 10. There is atrophy involving the because, psoas and paraspinal muscles bilaterally. 11. There is a left hip replacement. **This report has been created using voice recognition software. It may contain minor errors which are inherent in voice recognition technology. ** Final report electronically signed by DR Nicolette Shah on 7/25/2021 1:34 PM    XR CHEST 1 VIEW    Result Date: 7/25/2021  PROCEDURE: XR CHEST 1 VIEW CLINICAL INFORMATION: picc line placement COMPARISON: No prior study. TECHNIQUE:  AP chest single view  FINDINGS: There is a right upper extremity PICC present with the tip overlying the cavoatrial junction. The heart size is normal. No focal consolidation, pleural effusion or pneumothorax is seen. Left shoulder joint arthrosis is noted. 1. Right upper extremity PICC tip projects over the cavoatrial junction. **This report has been created using voice recognition software. It may contain minor errors which are inherent in voice recognition technology. ** Final report electronically signed by Dr. Brijesh Adame on 7/25/2021 10:30 AM      DVT prophylaxis: [] Lovenox                                 [] SCDs                                 [] SQ Heparin                                 [] Encourage ambulation           [x] Already on Anticoagulation~Eliquis     Code Status: DNR-CCA    Tele:   [] yes             [x] no    Active Hospital Problems    Diagnosis Date Noted    Weakness [R53.1] 07/26/2021    UTI (urinary tract infection) [N39.0] 07/24/2021       Electronically signed by BRENDAN Briones CNP on 7/28/2021 at 7:54 AM

## 2021-07-28 NOTE — CARE COORDINATION
7/28/21, 1:28 PM EDT    [address] has declined, [facility] is out of network, [facility] is out of network. Referral made to [facility] and [facility]. Facilities are reviewing to determine if they could accept and would precert ecf stay.

## 2021-07-28 NOTE — CARE COORDINATION
7/28/21, 11:07 AM EDT    DISCHARGE ON [address] day: 2  Location: -05/005-A Reason for admit: UTI (urinary tract infection) [N39.0]  Weakness [R53.1]   Procedure:   no  Barriers to Discharge:  Admitted per hospitalist with UTI. ID consult. IV antibiotics. Neurosurgery consult for back pain. PT/OT. N/V checks. Pain management. Daily weights. Strict I&O.   PCP: Ace Quintero MD  Readmission Risk Score: 14%  Patient Goals/Plan/Treatment Preferences:   No Medicaid application started at this time per patient preference. Althea Yip, on case and will attempt precert approval from [facility] at facility of patient/ daughter's choice. Maksim, hospitalist, said she is medically ready for ECF placement. Patient is currently following with Dr Kayode Avilez. Please notify him on discharge to follow her. [facility] is reviewing case. See SW notes.

## 2021-07-29 LAB
GLUCOSE BLD-MCNC: 103 MG/DL (ref 70–108)
GLUCOSE BLD-MCNC: 164 MG/DL (ref 70–108)
GLUCOSE BLD-MCNC: 182 MG/DL (ref 70–108)
GLUCOSE BLD-MCNC: 93 MG/DL (ref 70–108)

## 2021-07-29 PROCEDURE — 1200000000 HC SEMI PRIVATE

## 2021-07-29 PROCEDURE — 6360000002 HC RX W HCPCS: Performed by: INTERNAL MEDICINE

## 2021-07-29 PROCEDURE — 6370000000 HC RX 637 (ALT 250 FOR IP): Performed by: NURSE PRACTITIONER

## 2021-07-29 PROCEDURE — 2580000003 HC RX 258: Performed by: INTERNAL MEDICINE

## 2021-07-29 PROCEDURE — 99232 SBSQ HOSP IP/OBS MODERATE 35: CPT | Performed by: NURSE PRACTITIONER

## 2021-07-29 PROCEDURE — 82948 REAGENT STRIP/BLOOD GLUCOSE: CPT

## 2021-07-29 PROCEDURE — 97535 SELF CARE MNGMENT TRAINING: CPT

## 2021-07-29 PROCEDURE — 6370000000 HC RX 637 (ALT 250 FOR IP): Performed by: INTERNAL MEDICINE

## 2021-07-29 RX ORDER — POLYETHYLENE GLYCOL 3350 17 G/17G
17 POWDER, FOR SOLUTION ORAL DAILY
Status: DISCONTINUED | OUTPATIENT
Start: 2021-07-29 | End: 2021-08-11 | Stop reason: HOSPADM

## 2021-07-29 RX ADMIN — BUSPIRONE HYDROCHLORIDE 5 MG: 5 TABLET ORAL at 09:09

## 2021-07-29 RX ADMIN — PANTOPRAZOLE SODIUM 40 MG: 40 TABLET, DELAYED RELEASE ORAL at 06:40

## 2021-07-29 RX ADMIN — CYCLOBENZAPRINE 10 MG: 10 TABLET, FILM COATED ORAL at 09:11

## 2021-07-29 RX ADMIN — LABETALOL HYDROCHLORIDE 100 MG: 100 TABLET, FILM COATED ORAL at 09:10

## 2021-07-29 RX ADMIN — HYDROCODONE BITARTRATE AND ACETAMINOPHEN 1 TABLET: 5; 325 TABLET ORAL at 16:04

## 2021-07-29 RX ADMIN — HYDROCODONE BITARTRATE AND ACETAMINOPHEN 1 TABLET: 5; 325 TABLET ORAL at 22:15

## 2021-07-29 RX ADMIN — AMIODARONE HYDROCHLORIDE 200 MG: 200 TABLET ORAL at 09:08

## 2021-07-29 RX ADMIN — DOCUSATE SODIUM 100 MG: 100 CAPSULE ORAL at 21:30

## 2021-07-29 RX ADMIN — SODIUM CHLORIDE, PRESERVATIVE FREE 10 ML: 5 INJECTION INTRAVENOUS at 22:38

## 2021-07-29 RX ADMIN — Medication 400 MG: at 09:09

## 2021-07-29 RX ADMIN — FUROSEMIDE 20 MG: 40 TABLET ORAL at 09:10

## 2021-07-29 RX ADMIN — HYDROCODONE BITARTRATE AND ACETAMINOPHEN 1 TABLET: 5; 325 TABLET ORAL at 09:07

## 2021-07-29 RX ADMIN — METOPROLOL TARTRATE 75 MG: 25 TABLET, FILM COATED ORAL at 22:35

## 2021-07-29 RX ADMIN — SODIUM CHLORIDE, PRESERVATIVE FREE 10 ML: 5 INJECTION INTRAVENOUS at 09:12

## 2021-07-29 RX ADMIN — GABAPENTIN 100 MG: 100 CAPSULE ORAL at 14:09

## 2021-07-29 RX ADMIN — INSULIN GLARGINE 30 UNITS: 100 INJECTION, SOLUTION SUBCUTANEOUS at 21:29

## 2021-07-29 RX ADMIN — POLYETHYLENE GLYCOL 3350 17 G: 17 POWDER, FOR SOLUTION ORAL at 14:09

## 2021-07-29 RX ADMIN — APIXABAN 5 MG: 5 TABLET, FILM COATED ORAL at 21:29

## 2021-07-29 RX ADMIN — DOCUSATE SODIUM 100 MG: 100 CAPSULE ORAL at 09:11

## 2021-07-29 RX ADMIN — ATORVASTATIN CALCIUM 40 MG: 40 TABLET, FILM COATED ORAL at 21:30

## 2021-07-29 RX ADMIN — CEFTRIAXONE SODIUM 1000 MG: 1 INJECTION, POWDER, FOR SOLUTION INTRAMUSCULAR; INTRAVENOUS at 19:08

## 2021-07-29 RX ADMIN — APIXABAN 5 MG: 5 TABLET, FILM COATED ORAL at 09:09

## 2021-07-29 RX ADMIN — RAMIPRIL 20 MG: 10 CAPSULE ORAL at 09:07

## 2021-07-29 RX ADMIN — AMLODIPINE BESYLATE 5 MG: 5 TABLET ORAL at 09:09

## 2021-07-29 RX ADMIN — GABAPENTIN 100 MG: 100 CAPSULE ORAL at 21:29

## 2021-07-29 RX ADMIN — HYDROCODONE BITARTRATE AND ACETAMINOPHEN 1 TABLET: 5; 325 TABLET ORAL at 00:25

## 2021-07-29 RX ADMIN — BUSPIRONE HYDROCHLORIDE 5 MG: 5 TABLET ORAL at 21:30

## 2021-07-29 RX ADMIN — LABETALOL HYDROCHLORIDE 100 MG: 100 TABLET, FILM COATED ORAL at 21:29

## 2021-07-29 RX ADMIN — GABAPENTIN 100 MG: 100 CAPSULE ORAL at 09:08

## 2021-07-29 RX ADMIN — METOPROLOL TARTRATE 75 MG: 25 TABLET, FILM COATED ORAL at 09:08

## 2021-07-29 RX ADMIN — CYCLOBENZAPRINE 10 MG: 10 TABLET, FILM COATED ORAL at 21:30

## 2021-07-29 RX ADMIN — CITALOPRAM 10 MG: 20 TABLET, FILM COATED ORAL at 09:10

## 2021-07-29 RX ADMIN — ISOSORBIDE MONONITRATE 30 MG: 30 TABLET ORAL at 09:09

## 2021-07-29 ASSESSMENT — PAIN SCALES - GENERAL
PAINLEVEL_OUTOF10: 0
PAINLEVEL_OUTOF10: 6
PAINLEVEL_OUTOF10: 7
PAINLEVEL_OUTOF10: 0
PAINLEVEL_OUTOF10: 6
PAINLEVEL_OUTOF10: 0
PAINLEVEL_OUTOF10: 0
PAINLEVEL_OUTOF10: 10

## 2021-07-29 ASSESSMENT — PAIN DESCRIPTION - PROGRESSION: CLINICAL_PROGRESSION: GRADUALLY WORSENING

## 2021-07-29 ASSESSMENT — PAIN - FUNCTIONAL ASSESSMENT
PAIN_FUNCTIONAL_ASSESSMENT: PREVENTS OR INTERFERES SOME ACTIVE ACTIVITIES AND ADLS
PAIN_FUNCTIONAL_ASSESSMENT: ACTIVITIES ARE NOT PREVENTED

## 2021-07-29 ASSESSMENT — PAIN DESCRIPTION - LOCATION
LOCATION: LEG
LOCATION: LEG

## 2021-07-29 ASSESSMENT — PAIN DESCRIPTION - PAIN TYPE
TYPE: ACUTE PAIN
TYPE: CHRONIC PAIN

## 2021-07-29 ASSESSMENT — PAIN DESCRIPTION - ORIENTATION
ORIENTATION: RIGHT
ORIENTATION: RIGHT

## 2021-07-29 ASSESSMENT — PAIN DESCRIPTION - FREQUENCY
FREQUENCY: CONTINUOUS
FREQUENCY: CONTINUOUS

## 2021-07-29 ASSESSMENT — PAIN DESCRIPTION - DESCRIPTORS
DESCRIPTORS: JABBING;BURNING
DESCRIPTORS: SHARP

## 2021-07-29 ASSESSMENT — PAIN DESCRIPTION - ONSET
ONSET: ON-GOING
ONSET: ON-GOING

## 2021-07-29 NOTE — PROGRESS NOTES
[facility]  PHYSICAL THERAPY MISSED TREATMENT NOTE  ACUTE CARE  [facility] ORTHOPEDICS 7K              Missed Treatment  Pt refused PT stating she was having 10/10 pain in her right LE after OT this am. She had ice on it now and did not want to move. Will check on pt tomorrow.

## 2021-07-29 NOTE — PLAN OF CARE
Problem: Pain:  Goal: Pain level will decrease  Description: Pain level will decrease  Outcome: Ongoing  Patient states pain relief from PRN pain medications. Pain reassessed one hour post PRN pain medication given. Patient rates pain 6 on DEANNA 0-10 scale. Problem: Falls - Risk of:  Goal: Will remain free from falls  Description: Will remain free from falls  Outcome: Met This Shift  Fall assessment completed. Patient using call light appropriately to call for assistance with ambulation to bathroom. Personal items within reach. Patient is also compliant with use of non-skid slippers. Problem: Skin Integrity:  Goal: Will show no infection signs and symptoms  Description: Will show no infection signs and symptoms  Outcome: Met This Shift  No s/s infection for shift. Problem: Skin Integrity:  Goal: Absence of new skin breakdown  Description: Absence of new skin breakdown  Outcome: Met This Shift  No skin breakdown this shift. Patient being assisted with turning. Patients states understanding of repositioning every two hours. Problem: Musculor/Skeletal Functional Status  Goal: Highest potential functional level  Outcome: Ongoing  Patient ambulates with 1 assist. Gait steady. Patient able to perform passive ROM. Problem: Musculor/Skeletal Functional Status  Goal: Absence of falls  Outcome: Met This Shift  No falls noted this shift. Patient ambulates with x1 staff assistance without difficulty. Family member at bedside, spent the day. Bed kept in low position. Safe environment maintained. Bedside table & call light in reach. Uses call light appropriately when needing assistance. Problem: Nutrition  Goal: Optimal nutrition therapy  Outcome: Met This Shift  Patients appetite good. Continuing to encourage fluids. Problem: DISCHARGE BARRIERS  Goal: Patient's continuum of care needs are met  Outcome: Ongoing  Discharge plan is in process. Plan discharge to F.     Care plan reviewed

## 2021-07-29 NOTE — PROGRESS NOTES
Hospitalist Progress Note    Patient:  Richard Ellison      Unit/Bed:7K-05/005-A    YOB: 1940    MRN: [id number]       Acct: [de-identified]     PCP: Shelby Jones MD    Date of Admission: 7/24/2021    Assessment/Plan:    1. Physical deconditioning/debility--likely secondary to age and recent surgery; PT/OT, social work is assisting with placement  2. Hypokalemia--resolved with replacement  3. Osteomyelitis L2-L3 status post laminectomy--appreciate neurosurgical input; on Rocephin~infectious disease will not see as pt follows with Dr Sylvester Bones; appears patient to be on Rocephin for total of 6 weeks so will need to obtain old records from [facility] to determine when Rocephin was started  4. Diabetes mellitus type 2, uncontrolled--on Lantus along with low-dose sliding scale, monitor  5. Essential hypertension, uncontrolled--on Norvasc, Lopressor, Lasix Imdur, Altace, monitor  6. Diastolic heart failure--on Lopressor, Lasix  7. VHD with moderate tricuspid regurgitation/mild aortic stenosis  8. Atrial fibrillation--on Eliquis, amiodarone  9. Normocytic anemia    Expected discharge date: Pending placement and clinical progress    Disposition:    [] Home       [] TCU       [] Rehab       [] Psych       [x] SNF       [] [place]       [] Other-    Chief Complaint: Inability to care for herself    Hospital Course: Per progress note dated 7/27: Kenny Cardoso a [de-identified] y.o. female who presents to the emergency department for evaluation of bilateral leg weakness.  The patient states after her morning routine yesterday morning she noticed increased weakness when walking and is now unable to walk with her walker.  She underwent low back surgery a month ago and states that since the surgery she has been having trouble walking and has had to ambulate using a walker.  She also reports new paresthesias bilaterally in both lower extremities.  She denies chest pain, shortness of breath, diaphoresis, diarrhea, or constipation. The patient has no other acute complaints at this time. MRI lumbar spine 2 weeks agp showed discitis and   osteomyelitis at L2-L3, laminectomy L2-3 with possible abscess posteriorly external to the   spinal canal, no insignificant interval changes noted overall. Dr Rob Luciano and DR Terry Rae   consulted who both agree patient does not need any surgical intervention at this time. Patient was to discharge previousy on 6 wks of antiobiotic however this was not done at   [facility]. Patient has PICC. Patient is feeling much improved, labs stable, afebrile. Patient   working with therapy. Dr Terry Rae wrote for Rx for Ceftriaxone and this will be continued for   6 wks.  Patient stable for d/c to SNF,\"    7/28--> hemodynamically stable, laboratory studies are stable except hemoglobin at 9.4 and this morning her glucose was 67    7/29--> infectious disease will not see patient as the patient follows with another ID doctor at [facility] so attempting to obtain medical records; hemodynamically stable, glucose at 97 this morning    Subjective (past 24 hours): Up in the chair; ate breakfast, has not had a bowel movement    Medications:  Reviewed    Infusion Medications    dextrose      sodium chloride       Scheduled Medications    pantoprazole  40 mg Oral QAM AC    insulin lispro  0-6 Units Subcutaneous TID WC    insulin lispro  0-3 Units Subcutaneous Nightly    busPIRone  5 mg Oral BID    citalopram  10 mg Oral Daily    docusate sodium  100 mg Oral BID    gabapentin  100 mg Oral TID    amiodarone  200 mg Oral Daily    amLODIPine  5 mg Oral Daily    apixaban  5 mg Oral BID    atorvastatin  40 mg Oral Nightly    cyclobenzaprine  10 mg Oral BID    isosorbide mononitrate  30 mg Oral Daily    labetalol  100 mg Oral BID    insulin glargine  30 Units Subcutaneous Nightly    magnesium oxide  400 mg Oral Daily    metoprolol tartrate  75 mg Oral BID    potassium chloride  10 mEq Oral Q MWF    ramipril 20 mg Oral Daily    sodium chloride flush  10 mL Intravenous 2 times per day    cefTRIAXone (ROCEPHIN) IV  1,000 mg Intravenous Q24H    furosemide  20 mg Oral Daily     PRN Meds: glucose, dextrose, glucagon (rDNA), dextrose, sodium chloride flush, sodium chloride, ondansetron **OR** ondansetron, polyethylene glycol, acetaminophen **OR** acetaminophen, potassium chloride **OR** potassium alternative oral replacement **OR** potassium chloride, magnesium sulfate, HYDROcodone 5 mg - acetaminophen      Intake/Output Summary (Last 24 hours) at 7/29/2021 0601  Last data filed at 7/28/2021 2255  Gross per 24 hour   Intake 1510 ml   Output --   Net 1510 ml       Diet:  ADULT DIET; Regular; 4 carb choices (60 gm/meal); Low Sodium (2 gm)  Adult Oral Nutrition Supplement; Diabetic Oral Supplement  Adult Oral Nutrition Supplement; Other Oral Supplement; activa tid with meals    Exam:  BP (!) 119/56   Pulse 59   Temp 97.2 °F (36.2 °C) (Oral)   Resp 16   Ht 5' 4\" (1.626 m)   Wt 171 lb (77.6 kg)   SpO2 97%   BMI 29.35 kg/m²     General appearance: No apparent distress, appears stated age and cooperative. HEENT: Pupils equal, round, and reactive to light. Conjunctivae/corneas clear. Neck: Supple, with full range of motion. No jugular venous distention. Trachea midline. Respiratory:  Normal respiratory effort. Clear to auscultation, bilaterally without Rales/Wheezes/Rhonchi. Cardiovascular: Regular rate and rhythm with normal S1/S2; (+) murmur noted. Abdomen: Soft, non-tender, non-distended with normal bowel sounds. Musculoskeletal: passive and active ROM x 4 extremities. Skin: Skin color, texture, turgor normal.    Neurologic:  Neurovascularly intact without any focal sensory/motor deficits.  Cranial nerves: II-XII intact, grossly non-focal.  Psychiatric: Alert and oriented, thought content appropriate  Capillary Refill: Brisk,< 3 seconds   Peripheral Pulses: +2 palpable, equal bilaterally       Labs:   Recent Labs 07/28/21  0630   WBC 5.5   HGB 9.4*   HCT 31.1*        Recent Labs     07/28/21  0630      K 3.8      CO2 31   BUN 11   CREATININE 0.5   CALCIUM 9.5     Microbiology:    None    Radiology:  MRI LUMBAR SPINE W WO CONTRAST    Result Date: 7/25/2021  PROCEDURE: MRI LUMBAR SPINE W WO CONTRAST CLINICAL INFORMATION: Lumbar diskitis; LE weakness. COMPARISON: Report of previous  MRI scan dated 6/29/2021. . TECHNIQUE: Sagittal and axial T1 and T2-weighted images were obtained to the lumbar spine. Postcontrast axial and sagittal T1-weighted images were also obtained. FINDINGS: There is abnormal signal intensity in approximately the L1 vertebral body,, superior aspect of the L2 vertebral body, T12-L1 and L1-2 disc spaces consistent with vertebral body osteomyelitis and disc space infection. There is possible previous instrumentation at L1-2. There is abnormal signal intensity enhancement the soft tissues dorsally at L1 and L2. The lumbar vertebral bodies are normally aligned. There is normal marrow signal throughout. There is no bone marrow edema. There are no compression fractures. No pars defects are noted. The discs have normal signal throughout. The visualized aspects of the distal spinal cord are normal. The nerve roots of the cauda equina and the tip of the conus are normal. There are no gross abnormalities in the distal thoracic spine. On the axial images, at T12-L1, there is mild canal and moderate bilateral from stenosis At L1-L2, there is mild canal and moderate to severe bilateral foraminal stenosis. At L2-3, there is mild to moderate canal and moderate bilateral foraminal stenosis. At L3-4, there is moderate canal and moderate to severe bilateral foraminal stenosis At L4-5, there is mild-to-moderate canal and moderate bilateral foraminal stenosis At L5-S1, there is mild canal and moderate bilateral foraminal stenosis.  There is degenerative change involving the sacroiliac joints bilaterally. There is no abnormal enhancement. There is atrophy involving the iliacus, psoas and paraspinal muscles bilaterally. There is a left hip replacement. 1. Previous instrumentation at L1-2. 2. Abnormal signal intensity within the L1 vertebral body and superior aspect of the L2 vertebral body consistent with osteomyelitis. 3. Abnormal signal intensity within the T12-L1 and L1-2 disc spaces, consistent with disc space infection. 4. Abnormal signal intensity and enhancement in the soft tissues dorsally at L1 and L2 consistent with postoperative and possible inflammatory changes. 5. There is mild-to-moderate canal and moderate bilateral from stenosis at L2-3 and L4-5. 6. There is moderate canal and moderate to severe bilateral foraminal stenosis at L3-4. 7. There is mild canal and moderate severe bilateral foraminal stenosis at L1-2. 8. There is mild canal and moderate bilateral foraminal stenosis at T12-L1 and L5-S1. 9. There is degenerative change involving the sacroiliac joints bilaterally. 10. There is atrophy involving the because, psoas and paraspinal muscles bilaterally. 11. There is a left hip replacement. **This report has been created using voice recognition software. It may contain minor errors which are inherent in voice recognition technology. ** Final report electronically signed by DR Suman Arechiga on 7/25/2021 1:34 PM    XR CHEST 1 VIEW    Result Date: 7/25/2021  PROCEDURE: XR CHEST 1 VIEW CLINICAL INFORMATION: picc line placement COMPARISON: No prior study. TECHNIQUE:  AP chest single view  FINDINGS: There is a right upper extremity PICC present with the tip overlying the cavoatrial junction. The heart size is normal. No focal consolidation, pleural effusion or pneumothorax is seen. Left shoulder joint arthrosis is noted. 1. Right upper extremity PICC tip projects over the cavoatrial junction. **This report has been created using voice recognition software.   It may contain minor errors which are inherent in voice recognition technology. ** Final report electronically signed by Dr. Paul Solis on 7/25/2021 10:30 AM      DVT prophylaxis: [] Lovenox                                 [] SCDs                                 [] SQ Heparin                                 [] Encourage ambulation           [x] Already on Anticoagulation~Eliquis     Code Status: DNR-CCA    Tele:   [] yes             [x] no    Active Hospital Problems    Diagnosis Date Noted    Moderate malnutrition ([facility] [address] [E44.0] 07/28/2021     Class: Acute    Weakness [R53.1] 07/26/2021    UTI (urinary tract infection) [N39.0] 07/24/2021       Electronically signed by Lavelle Goodpasture, APRN - CNP on 7/29/2021 at 6:01 AM

## 2021-07-29 NOTE — PLAN OF CARE
Problem: Falls - Risk of:  Goal: Will remain free from falls  Description: Will remain free from falls  Outcome: Met This Shift     Problem: DISCHARGE BARRIERS  Goal: Patient's continuum of care needs are met  Outcome: Ongoing  Note: Awaiting precert for discharge. Patient lives home alone. Patient would prefer to go home on discharge. PICC line present. Dr. Alber Pearson from [facility] is patients infectious diseases doctor. Care plan reviewed with patient. Patient verbalize understanding of the plan of care and contribute to goal setting.

## 2021-07-29 NOTE — PROGRESS NOTES
[facility]  Occupational Therapy  Daily Note  Time:   Time In: 831  Time Out: 9079  Timed Code Treatment Minutes: 24 Minutes  Minutes: 24          Date: 2021  Patient Name: Se Styles,   Gender: female      [facility]  MRN: [id number]  : 1940  ([de-identified] y.o.)  Referring Practitioner: Dr. Edilma Paige Talubna  Diagnosis: UTI  Additional Pertinent Hx: [de-identified] y.o. female who presents to the emergency department for evaluation of bilateral leg weakness. The patient states after her morning routine yesterday morning she noticed increased weakness when walking and is now unable to walk with her walker. She underwent low back surgery a month ago and states that since the surgery she has been having trouble walking and has had to ambulate using a walker. She also reports new paresthesias bilaterally in both lower extremities. She denies chest pain, shortness of breath, diaphoresis, diarrhea, or constipation. \"    Restrictions/Precautions:  Restrictions/Precautions: General Precautions, Fall Risk  Position Activity Restriction  Spinal Precautions: No Bending, No Lifting, No Twisting  Other position/activity restrictions: hx lumbar laminectomy L2-L3 and osteomyelitis tx with abx     SUBJECTIVE: Pt. Nurse approved OT treatment. Pt. Resting in recliner and agreeable to OT treatment. PAIN: 10: BLEs and low back, Pt. Notified nurse for pain medication    Vitals: Vitals not assessed per clinical judgement, see nursing flowsheet    COGNITION: Decreased Safety Awareness    ADL:   Lower Extremity Dressing: Moderate Assistance, Maximum Assistance, with set-up, with verbal cues  and with increased time for completion. to don/doff footies with LHAE. BALANCE:  Standing Balance: Contact Guard Assistance, with cues for safety, with verbal cues . Pt. demo LOB prior to sitting while turning and plopped down inot chair, reinforced safety with turning and standing to sit.   Pt. voices an understanding although reports her balance is off and her LE's just give out on her. BED MOBILITY:  Not Tested    TRANSFERS:  Sit to Stand:  [employer], cues for hand placement. Stand to Sit: [employer], cues for hand placement. FUNCTIONAL MOBILITY:  Assistive Device: Rolling Walker  Assist Level:  Contact Guard Assistance and with verbal cues . Distance: within room to EOB and back to recliner   and safety with functional mobility and transfers. ADDITIONAL ACTIVITIES:  Pt. Instructed on LHAE use and donning/doffing of footies. Pt. Required mod/max Assistance to utilize and demo decreased hand strength when attempting to apply sock over the sock aid. Pt.reports she has arthritic hands. ASSESSMENT:     Activity Tolerance:  Patient tolerance of  treatment: fair. Discharge Recommendations: [address], Patient would benefit from continued therapy after discharge   Equipment Recommendations: Other: continue to assess  Plan: Times per week: 5x  Current Treatment Recommendations: Strengthening, Patient/Caregiver Education & Training, Balance Training, Endurance Training, Safety Education & Training, Self-Care / ADL    Patient Education  Patient Education: ADL's, Equipment Education, Importance of Increasing Activity and Assistive Device Safety    Goals  Short term goals  Time Frame for Short term goals: by discharge  Short term goal 1: Pt to complete LB dressign tasks using [employer] as needed with min A  Short term goal 2: Pt to increase standing tolerance to be able to complete grooming tasks at sink with CGA and no LOB during  Short term goal 3: Pt to complete toileting and transfer iwth CGA and no cues for safety    Following session, patient left in safe position with all fall risk precautions in place.

## 2021-07-29 NOTE — CARE COORDINATION
7/29/21, 3:14 PM EDT    DISCHARGE ON [address] day: 3  Location: -05/005-A Reason for admit: UTI (urinary tract infection) [N39.0]  Weakness [R53.1]    Procedure:   no  Barriers to Discharge:  Admitted per hospitalist with UTI. ID consult. IV antibiotics. Neurosurgery consult for back pain. PT/OT. N/V checks. Pain management. Daily weights. Strict I&O.   PCP: Johnson Watson MD  Readmission Risk Score: 14%  Patient Goals/Plan/Treatment Preferences:   No Medicaid application started at this time per patient preference. Public benefits to readress with Jonnathan Cano. Robert Buckner, on case and will attempt precert approval from [facility] at facility of patient/ daughter's choice. Maksim, hospitalist, said she is medically ready for ECF placement. Patient is currently following with Dr Jenny Farias. Hospitalist requested primary nurse to obtain [facility] recent records for antibiotics plan. Robert Buckner, still working to find a facility to accept her.

## 2021-07-29 NOTE — CARE COORDINATION
7/29/21, 10:15 AM EDT    DISCHARGE PLANNING EVALUATION      Spoke with patient about ecf referrals, waiting decision from [facility] or [facility] about trying to precert for a new ecf stay. Spoke with public benefits, who may try to revisit today to further address medicaid application.

## 2021-07-30 LAB
ANION GAP SERPL CALCULATED.3IONS-SCNC: 9 MEQ/L (ref 8–16)
BUN BLDV-MCNC: 11 MG/DL (ref 7–22)
CALCIUM SERPL-MCNC: 9.2 MG/DL (ref 8.5–10.5)
CHLORIDE BLD-SCNC: 100 MEQ/L (ref 98–111)
CO2: 31 MEQ/L (ref 23–33)
CREAT SERPL-MCNC: 0.5 MG/DL (ref 0.4–1.2)
ERYTHROCYTE [DISTWIDTH] IN BLOOD BY AUTOMATED COUNT: 16.2 % (ref 11.5–14.5)
ERYTHROCYTE [DISTWIDTH] IN BLOOD BY AUTOMATED COUNT: 53.1 FL (ref 35–45)
GFR SERPL CREATININE-BSD FRML MDRD: > 90 ML/MIN/1.73M2
GLUCOSE BLD-MCNC: 186 MG/DL (ref 70–108)
GLUCOSE BLD-MCNC: 201 MG/DL (ref 70–108)
GLUCOSE BLD-MCNC: 271 MG/DL (ref 70–108)
GLUCOSE BLD-MCNC: 74 MG/DL (ref 70–108)
GLUCOSE BLD-MCNC: 75 MG/DL (ref 70–108)
HCT VFR BLD CALC: 30.3 % (ref 37–47)
HEMOGLOBIN: 9 GM/DL (ref 12–16)
MCH RBC QN AUTO: 26.5 PG (ref 26–33)
MCHC RBC AUTO-ENTMCNC: 29.7 GM/DL (ref 32.2–35.5)
MCV RBC AUTO: 89.1 FL (ref 81–99)
PLATELET # BLD: 330 THOU/MM3 (ref 130–400)
PMV BLD AUTO: 9.4 FL (ref 9.4–12.4)
POTASSIUM SERPL-SCNC: 3.8 MEQ/L (ref 3.5–5.2)
RBC # BLD: 3.4 MILL/MM3 (ref 4.2–5.4)
SODIUM BLD-SCNC: 140 MEQ/L (ref 135–145)
WBC # BLD: 4.8 THOU/MM3 (ref 4.8–10.8)

## 2021-07-30 PROCEDURE — 6370000000 HC RX 637 (ALT 250 FOR IP): Performed by: INTERNAL MEDICINE

## 2021-07-30 PROCEDURE — 6370000000 HC RX 637 (ALT 250 FOR IP): Performed by: NURSE PRACTITIONER

## 2021-07-30 PROCEDURE — 82948 REAGENT STRIP/BLOOD GLUCOSE: CPT

## 2021-07-30 PROCEDURE — 36415 COLL VENOUS BLD VENIPUNCTURE: CPT

## 2021-07-30 PROCEDURE — 1200000000 HC SEMI PRIVATE

## 2021-07-30 PROCEDURE — 2580000003 HC RX 258: Performed by: INTERNAL MEDICINE

## 2021-07-30 PROCEDURE — 97116 GAIT TRAINING THERAPY: CPT

## 2021-07-30 PROCEDURE — 99232 SBSQ HOSP IP/OBS MODERATE 35: CPT | Performed by: NURSE PRACTITIONER

## 2021-07-30 PROCEDURE — 80048 BASIC METABOLIC PNL TOTAL CA: CPT

## 2021-07-30 PROCEDURE — 6360000002 HC RX W HCPCS: Performed by: INTERNAL MEDICINE

## 2021-07-30 PROCEDURE — 97110 THERAPEUTIC EXERCISES: CPT

## 2021-07-30 PROCEDURE — 85027 COMPLETE CBC AUTOMATED: CPT

## 2021-07-30 RX ORDER — SENNA PLUS 8.6 MG/1
1 TABLET ORAL NIGHTLY
Status: DISCONTINUED | OUTPATIENT
Start: 2021-07-30 | End: 2021-08-11 | Stop reason: HOSPADM

## 2021-07-30 RX ADMIN — ATORVASTATIN CALCIUM 40 MG: 40 TABLET, FILM COATED ORAL at 20:57

## 2021-07-30 RX ADMIN — SODIUM CHLORIDE, PRESERVATIVE FREE 10 ML: 5 INJECTION INTRAVENOUS at 21:07

## 2021-07-30 RX ADMIN — HYDROCODONE BITARTRATE AND ACETAMINOPHEN 1 TABLET: 5; 325 TABLET ORAL at 12:07

## 2021-07-30 RX ADMIN — POLYETHYLENE GLYCOL 3350 17 G: 17 POWDER, FOR SOLUTION ORAL at 09:11

## 2021-07-30 RX ADMIN — INSULIN LISPRO 1 UNITS: 100 INJECTION, SOLUTION INTRAVENOUS; SUBCUTANEOUS at 18:41

## 2021-07-30 RX ADMIN — BUSPIRONE HYDROCHLORIDE 5 MG: 5 TABLET ORAL at 09:04

## 2021-07-30 RX ADMIN — LABETALOL HYDROCHLORIDE 100 MG: 100 TABLET, FILM COATED ORAL at 20:56

## 2021-07-30 RX ADMIN — GABAPENTIN 100 MG: 100 CAPSULE ORAL at 20:57

## 2021-07-30 RX ADMIN — INSULIN LISPRO 2 UNITS: 100 INJECTION, SOLUTION INTRAVENOUS; SUBCUTANEOUS at 12:10

## 2021-07-30 RX ADMIN — METOPROLOL TARTRATE 75 MG: 25 TABLET, FILM COATED ORAL at 20:57

## 2021-07-30 RX ADMIN — APIXABAN 5 MG: 5 TABLET, FILM COATED ORAL at 09:04

## 2021-07-30 RX ADMIN — CYCLOBENZAPRINE 10 MG: 10 TABLET, FILM COATED ORAL at 09:04

## 2021-07-30 RX ADMIN — DOCUSATE SODIUM 100 MG: 100 CAPSULE ORAL at 09:12

## 2021-07-30 RX ADMIN — AMIODARONE HYDROCHLORIDE 200 MG: 200 TABLET ORAL at 09:04

## 2021-07-30 RX ADMIN — INSULIN GLARGINE 30 UNITS: 100 INJECTION, SOLUTION SUBCUTANEOUS at 21:02

## 2021-07-30 RX ADMIN — CYCLOBENZAPRINE 10 MG: 10 TABLET, FILM COATED ORAL at 21:00

## 2021-07-30 RX ADMIN — RAMIPRIL 20 MG: 10 CAPSULE ORAL at 09:04

## 2021-07-30 RX ADMIN — CEFTRIAXONE SODIUM 1000 MG: 1 INJECTION, POWDER, FOR SOLUTION INTRAMUSCULAR; INTRAVENOUS at 19:20

## 2021-07-30 RX ADMIN — GABAPENTIN 100 MG: 100 CAPSULE ORAL at 16:27

## 2021-07-30 RX ADMIN — DOCUSATE SODIUM 100 MG: 100 CAPSULE ORAL at 21:00

## 2021-07-30 RX ADMIN — PANTOPRAZOLE SODIUM 40 MG: 40 TABLET, DELAYED RELEASE ORAL at 05:57

## 2021-07-30 RX ADMIN — HYDROCODONE BITARTRATE AND ACETAMINOPHEN 1 TABLET: 5; 325 TABLET ORAL at 05:17

## 2021-07-30 RX ADMIN — CITALOPRAM 10 MG: 20 TABLET, FILM COATED ORAL at 09:05

## 2021-07-30 RX ADMIN — METOPROLOL TARTRATE 75 MG: 25 TABLET, FILM COATED ORAL at 09:04

## 2021-07-30 RX ADMIN — BUSPIRONE HYDROCHLORIDE 5 MG: 5 TABLET ORAL at 20:57

## 2021-07-30 RX ADMIN — GABAPENTIN 100 MG: 100 CAPSULE ORAL at 09:04

## 2021-07-30 RX ADMIN — HYDROCODONE BITARTRATE AND ACETAMINOPHEN 1 TABLET: 5; 325 TABLET ORAL at 18:43

## 2021-07-30 RX ADMIN — POTASSIUM CHLORIDE 10 MEQ: 750 TABLET, EXTENDED RELEASE ORAL at 09:03

## 2021-07-30 RX ADMIN — Medication 400 MG: at 09:04

## 2021-07-30 RX ADMIN — ISOSORBIDE MONONITRATE 30 MG: 30 TABLET ORAL at 09:04

## 2021-07-30 RX ADMIN — SODIUM CHLORIDE, PRESERVATIVE FREE 10 ML: 5 INJECTION INTRAVENOUS at 09:14

## 2021-07-30 RX ADMIN — FUROSEMIDE 20 MG: 40 TABLET ORAL at 09:04

## 2021-07-30 RX ADMIN — APIXABAN 5 MG: 5 TABLET, FILM COATED ORAL at 20:57

## 2021-07-30 RX ADMIN — AMLODIPINE BESYLATE 5 MG: 5 TABLET ORAL at 09:04

## 2021-07-30 RX ADMIN — LABETALOL HYDROCHLORIDE 100 MG: 100 TABLET, FILM COATED ORAL at 09:04

## 2021-07-30 ASSESSMENT — PAIN DESCRIPTION - DESCRIPTORS: DESCRIPTORS: ACHING

## 2021-07-30 ASSESSMENT — PAIN DESCRIPTION - LOCATION: LOCATION: GENERALIZED

## 2021-07-30 ASSESSMENT — PAIN DESCRIPTION - FREQUENCY: FREQUENCY: INTERMITTENT

## 2021-07-30 ASSESSMENT — PAIN DESCRIPTION - PROGRESSION
CLINICAL_PROGRESSION: NOT CHANGED

## 2021-07-30 ASSESSMENT — PAIN SCALES - GENERAL
PAINLEVEL_OUTOF10: 6
PAINLEVEL_OUTOF10: 4
PAINLEVEL_OUTOF10: 0
PAINLEVEL_OUTOF10: 10
PAINLEVEL_OUTOF10: 2
PAINLEVEL_OUTOF10: 6
PAINLEVEL_OUTOF10: 2

## 2021-07-30 ASSESSMENT — PAIN - FUNCTIONAL ASSESSMENT: PAIN_FUNCTIONAL_ASSESSMENT: ACTIVITIES ARE NOT PREVENTED

## 2021-07-30 ASSESSMENT — PAIN DESCRIPTION - PAIN TYPE: TYPE: CHRONIC PAIN

## 2021-07-30 ASSESSMENT — PAIN DESCRIPTION - ONSET: ONSET: ON-GOING

## 2021-07-30 NOTE — PROGRESS NOTES
[facility]  OCCUPATIONAL THERAPY MISSED TREATMENT NOTE  [facility] 7K  7K-05/005-A      Date: 2021  Patient Name: Vaishnavi Chapman        CSN: [id number]   : 1940  ([de-identified] y.o.)  Gender: female   Referring Practitioner: Dr. Isaac Son. Taj  Diagnosis: UTI         REASON FOR MISSED TREATMENT: Patient Unavailable patient with PT upon arrival; will attempt at next available time.

## 2021-07-30 NOTE — CARE COORDINATION
7/30/21, 12:51 PM EDT    DISCHARGE PLANNING EVALUATION    Message left for SW to call pts sister Zelalem Hicks about not wanting [facility]. EMMY called Nellie back, left detailed message. EMMY spoke with pt, she states she would prefer [facility]. EMM[facility], states someone was just there to speak with pt and got financials worked out and were getting ready to start precert. Preeti Darling will speak with [facility] to see if changing facilities is a problem and will call SW back. EMMY called  [place] [facility] again, no answer, left detailed message with [facility] checking on possible placement.

## 2021-07-30 NOTE — PROGRESS NOTES
new paresthesias bilaterally in both lower extremities.  She denies chest pain, shortness of breath, diaphoresis, diarrhea, or constipation. The patient has no other acute complaints at this time. MRI lumbar spine 2 weeks agp showed discitis and   osteomyelitis at L2-L3, laminectomy L2-3 with possible abscess posteriorly external to the   spinal canal, no insignificant interval changes noted overall. Dr Lupe Lund and DR Jenny Farias   consulted who both agree patient does not need any surgical intervention at this time. Patient was to discharge previousy on 6 wks of antiobiotic however this was not done at   [facility]. Patient has PICC. Patient is feeling much improved, labs stable, afebrile. Patient   working with therapy. Dr Jenny Farias wrote for Rx for Ceftriaxone and this will be continued for   6 wks.  Patient stable for d/c to SNF,\"    7/28--> hemodynamically stable, laboratory studies are stable except hemoglobin at 9.4 and this morning her glucose was 67    7/29--> infectious disease will not see patient as the patient follows with another ID doctor at [facility] so attempting to obtain medical records; hemodynamically stable, glucose at 97 this morning    7/30--> old records from [facility] reviewed and timeframe was established for Rocephin; hemodynamically stable; awaiting plan from  regarding placement    Subjective (past 24 hours): has not had a bowel movement yet otherwise offers no complaints; patient aware awaiting pre-CERT; will add MiraLAX and Colace; complains of right thigh pain rates 7 out of 10 and states it is a chronic issue    Medications:  Reviewed    Infusion Medications    dextrose      sodium chloride       Scheduled Medications    polyethylene glycol  17 g Oral Daily    pantoprazole  40 mg Oral QAM AC    insulin lispro  0-6 Units Subcutaneous TID WC    insulin lispro  0-3 Units Subcutaneous Nightly    busPIRone  5 mg Oral BID    citalopram  10 mg Oral Daily    docusate sodium  100 mg Oral BID    gabapentin  100 mg Oral TID    amiodarone  200 mg Oral Daily    amLODIPine  5 mg Oral Daily    apixaban  5 mg Oral BID    atorvastatin  40 mg Oral Nightly    cyclobenzaprine  10 mg Oral BID    isosorbide mononitrate  30 mg Oral Daily    labetalol  100 mg Oral BID    insulin glargine  30 Units Subcutaneous Nightly    magnesium oxide  400 mg Oral Daily    metoprolol tartrate  75 mg Oral BID    potassium chloride  10 mEq Oral Q MWF    ramipril  20 mg Oral Daily    sodium chloride flush  10 mL Intravenous 2 times per day    cefTRIAXone (ROCEPHIN) IV  1,000 mg Intravenous Q24H    furosemide  20 mg Oral Daily     PRN Meds: glucose, dextrose, glucagon (rDNA), dextrose, sodium chloride flush, sodium chloride, ondansetron **OR** ondansetron, polyethylene glycol, acetaminophen **OR** acetaminophen, potassium chloride **OR** potassium alternative oral replacement **OR** potassium chloride, magnesium sulfate, HYDROcodone 5 mg - acetaminophen      Intake/Output Summary (Last 24 hours) at 7/30/2021 9062  Last data filed at 7/30/2021 0413  Gross per 24 hour   Intake 930.65 ml   Output --   Net 930.65 ml       Diet:  ADULT DIET; Regular; 4 carb choices (60 gm/meal); Low Sodium (2 gm)  Adult Oral Nutrition Supplement; Diabetic Oral Supplement  Adult Oral Nutrition Supplement; Other Oral Supplement; activa tid with meals    Exam:  /67   Pulse 56   Temp 97.8 °F (36.6 °C) (Oral)   Resp 16   Ht 5' 4\" (1.626 m)   Wt 171 lb (77.6 kg)   SpO2 96%   BMI 29.35 kg/m²     General appearance: No apparent distress, appears stated age and cooperative. HEENT: Pupils equal, round, and reactive to light. Conjunctivae/corneas clear. Neck: Supple, with full range of motion. No jugular venous distention. Trachea midline. Respiratory:  Normal respiratory effort. Clear to auscultation, bilaterally without Rales/Wheezes/Rhonchi.   Cardiovascular: Regular rate and rhythm with normal S1/S2; (+) murmur noted. Abdomen: Soft, non-tender, non-distended with normal bowel sounds. Musculoskeletal: passive and active ROM x 4 extremities. Skin: Skin color, texture, turgor normal.    Neurologic:  Neurovascularly intact without any focal sensory/motor deficits. Cranial nerves: II-XII intact, grossly non-focal.  Psychiatric: Alert and oriented, thought content appropriate  Capillary Refill: Brisk,< 3 seconds   Peripheral Pulses: +2 palpable, equal bilaterally       Labs:   Recent Labs     07/28/21  0630 07/30/21  0600   WBC 5.5 4.8   HGB 9.4* 9.0*   HCT 31.1* 30.3*    330     Recent Labs     07/28/21  0630      K 3.8      CO2 31   BUN 11   CREATININE 0.5   CALCIUM 9.5     Microbiology:    None    Radiology:  MRI LUMBAR SPINE W WO CONTRAST    Result Date: 7/25/2021  PROCEDURE: MRI LUMBAR SPINE W WO CONTRAST CLINICAL INFORMATION: Lumbar diskitis; LE weakness. COMPARISON: Report of previous  MRI scan dated 6/29/2021. . TECHNIQUE: Sagittal and axial T1 and T2-weighted images were obtained to the lumbar spine. Postcontrast axial and sagittal T1-weighted images were also obtained. FINDINGS: There is abnormal signal intensity in approximately the L1 vertebral body,, superior aspect of the L2 vertebral body, T12-L1 and L1-2 disc spaces consistent with vertebral body osteomyelitis and disc space infection. There is possible previous instrumentation at L1-2. There is abnormal signal intensity enhancement the soft tissues dorsally at L1 and L2. The lumbar vertebral bodies are normally aligned. There is normal marrow signal throughout. There is no bone marrow edema. There are no compression fractures. No pars defects are noted. The discs have normal signal throughout. The visualized aspects of the distal spinal cord are normal. The nerve roots of the cauda equina and the tip of the conus are normal. There are no gross abnormalities in the distal thoracic spine.  On the axial images, at T12-L1, there is mild canal and moderate bilateral from stenosis At L1-L2, there is mild canal and moderate to severe bilateral foraminal stenosis. At L2-3, there is mild to moderate canal and moderate bilateral foraminal stenosis. At L3-4, there is moderate canal and moderate to severe bilateral foraminal stenosis At L4-5, there is mild-to-moderate canal and moderate bilateral foraminal stenosis At L5-S1, there is mild canal and moderate bilateral foraminal stenosis. There is degenerative change involving the sacroiliac joints bilaterally. There is no abnormal enhancement. There is atrophy involving the iliacus, psoas and paraspinal muscles bilaterally. There is a left hip replacement. 1. Previous instrumentation at L1-2. 2. Abnormal signal intensity within the L1 vertebral body and superior aspect of the L2 vertebral body consistent with osteomyelitis. 3. Abnormal signal intensity within the T12-L1 and L1-2 disc spaces, consistent with disc space infection. 4. Abnormal signal intensity and enhancement in the soft tissues dorsally at L1 and L2 consistent with postoperative and possible inflammatory changes. 5. There is mild-to-moderate canal and moderate bilateral from stenosis at L2-3 and L4-5. 6. There is moderate canal and moderate to severe bilateral foraminal stenosis at L3-4. 7. There is mild canal and moderate severe bilateral foraminal stenosis at L1-2. 8. There is mild canal and moderate bilateral foraminal stenosis at T12-L1 and L5-S1. 9. There is degenerative change involving the sacroiliac joints bilaterally. 10. There is atrophy involving the because, psoas and paraspinal muscles bilaterally. 11. There is a left hip replacement. **This report has been created using voice recognition software. It may contain minor errors which are inherent in voice recognition technology. ** Final report electronically signed by DR Jorge Sarmiento on 7/25/2021 1:34 PM    XR CHEST 1 VIEW    Result Date: 7/25/2021  PROCEDURE: XR CHEST

## 2021-07-30 NOTE — PROGRESS NOTES
[address] [address]  INPATIENT PHYSICAL THERAPY  DAILY NOTE  [facility] ORTHOPEDICS 7K - 7K-05/005-A    Time In: 9702  Time Out: 1407  Timed Code Treatment Minutes: 23 Minutes  Minutes: 23          Date: 2021  Patient Name: Ricardo Dey,  Gender:  female        MRN: [id number]  : 1940  ([de-identified] y.o.)     Referring Practitioner: Sarah Willett MD  Diagnosis: UTI  Additional Pertinent Hx: Per HPI: Bryce Vlilasenor is a [de-identified] y.o. female who presents to the emergency department for evaluation of bilateral leg weakness. The patient states after her morning routine yesterday morning she noticed increased weakness when walking and is now unable to walk with her walker. She underwent low back surgery a month ago and states that since the surgery she has been having trouble walking and has had to ambulate using a walker. She also reports new paresthesias bilaterally in both lower extremities. She denies chest pain, shortness of breath, diaphoresis, diarrhea, or constipation. \"     Prior Level of Function:  Lives With: Daughter (daughter lives with pt)  Type of Home: Mobile home  Home Layout: One level  Home Access: Stairs to enter with rails (platform steps wher she can put her walker up on)  Entrance Stairs - Number of Steps: 4  Home Equipment: Rolling walker, 4 wheeled walker, Cane, [id number] [facility] Shower/Tub: Walk-in shower, Shower chair with back  Bathroom Toilet: Handicap height    ADL Assistance: Independent  Ambulation Assistance: Needs assistance (pt states on her last day at [facility] she was walking independently with walker, but that was the first time, and pt got much weaker following that)  Transfer Assistance: Independent  Active : No  Additional Comments: pt states she left the SNF [facility]) last Friday and felt she was not ready to go home yet. Pt stating when she left SNF, she was able to complete her ADL tasks indep.  Pt stating she was a \"nervous wreck\" going home d/t not feeling ready and the need to change her PICC line dressing everyday. [facility] never had a chance to initiate d/t being home less than 24 hours. Restrictions/Precautions:  Restrictions/Precautions: General Precautions, Fall Risk  Position Activity Restriction  Spinal Precautions: No Bending, No Lifting, No Twisting  Other position/activity restrictions: hx lumbar laminectomy L2-L3 and osteomyelitis tx with abx     SUBJECTIVE: RN approved session. Pt resting in bed upon arrival with family member present. With encouragement pt agreeable to therapy. PAIN: 0/10: denies pain    Vitals: Vitals not assessed per clinical judgement, see nursing flowsheet    OBJECTIVE:  Bed Mobility:  Rolling to Right: Stand By Assistance   Supine to Sit: Minimal Assistance, At trunk. Cuing for improved technique. Transfers:  Sit to Stand: Contact Guard Assistance, From EOB  Stand to [employer] Assistance    Ambulation:  Contact Guard Assistance  Distance: 45 feet x1   Surface: Level Tile  Device:Rolling Walker  Gait Deviations: Forward Flexed Posture, Slow Daxa, Decreased Step Length Bilaterally, Decreased Gait Speed and Decreased Heel Strike Bilaterally    Balance:  Pt up in restroom, completed arianna clean up and washed hands at sink requiring one to no UE support on AD and CGA for stability. Exercise:  Patient was guided in 1 set(s) 10 reps of exercise to both lower extremities. Glut sets, Seated marches, Seated hamstring curls, Seated heel/toe raises, Long arc quads and Seated abduction/adduction. Exercises were completed for increased independence with functional mobility. Functional Outcome Measures: Completed  AM-PAC Inpatient Mobility Raw Score : 16  AM-PAC Inpatient T-Scale Score : 40.78    ASSESSMENT:  Assessment: Patient progressing toward established goals. and Pt tolerated session fairly well. Limited by decreased strength, decreased endurance, decreased balance.  Would benefit from continued therapy at discharge. Activity Tolerance:  Patient tolerance of  treatment: good. Equipment Recommendations:Equipment Needed: No  Discharge Recommendations:  Subacute/Skilled Nursing Facility    Plan: Times per week: 6 x O  Times per day: Daily  Current Treatment Recommendations: Strengthening, Neuromuscular Re-education, Home Exercise Program, Safety Education & Training, Balance Training, Endurance Training, Patient/Caregiver Education & Training, Functional Mobility Training, Transfer Training, Gait Training, Stair training    Patient Education  Patient Education: Plan of Care, [employer] Mobility, Transfers, Gait    Goals:  Patient goals : go somewhere for therapy, find out what is causing leg weakness  Short term goals  Time Frame for Short term goals: by hospital discharge  Short term goal 1: Supine to/from sit, using log roll, with modified independence for ease of transfers at discharge site. Short term goal 2: Sit to/from stand with modified independence for ease of transfers at discharge site. Short term goal 3: Improve 5x STS time to less than 14.8 seconds to reflect age appropriate norms, indicating improved functional strength and endurance. Short term goal 4: Ambulate 48' with RW and modified independence for home distance ambulation. Short term goal 5: Ascend/descend 4 steps with BHR with modified independence for entry into home. Long term goals  Time Frame for Long term goals : N/A due to short ELOS. Following session, patient left in safe position with all fall risk precautions in place.

## 2021-07-30 NOTE — PROGRESS NOTES
Physician Progress Note      PATIENT:               Fox Tripathi  CSN #:                  [id number]  :                       1940  ADMIT DATE:       2021 12:51 PM  [address] [place] DATE:  RESPONDING  PROVIDER #:        Zaire Self CNP          QUERY TEXT:    Pt admitted with debility and weakness and noted patient is unable to care for   self and unable to walk . If possible, please document in the progress notes   and discharge summary if you are evaluating and / or treating any of the   following: The medical record reflects the following:  Risk Factors: Elderly  Clinical Indicators: pt unable to care for self, unable to walk with walker  Treatment:  consulted, ECF placement, PT/OT    Thank You!   Madison Pierson RN  RN Clinical   (N) [phone] (K) [phone]  Options provided:  -- Age Related Physical Debility  -- Frailty  -- Other - I will add my own diagnosis  -- Disagree - Not applicable / Not valid  -- Disagree - Clinically unable to determine / Unknown  -- Refer to Clinical Documentation Reviewer    PROVIDER RESPONSE TEXT:    In note    Query created by: Belen Mckeon on 2021 9:23 AM      Electronically signed by:  Zaire Self CNP 2021 11:14 AM

## 2021-07-30 NOTE — CARE COORDINATION
7/30/21, 12:37 PM EDT    DISCHARGE ON [address] day: 4  Location: -05/005-A Reason for admit: UTI (urinary tract infection) [N39.0]  Weakness [R53.1]   Procedure: Has PICC line  Barriers to Discharge: Admitted per hospitalist with UTI. ID consult. IV antibiotics. Neurosurgery consult for back pain. PT/OT. N/V checks. Pain management. Daily weights. Strict I&O.   PCP: Jaydon Clement MD  Readmission Risk Score: 15%  Patient Goals/Plan/Treatment Preferences:  EMMY Nichols, working on [facility] placement - see her notes. TAMMI Reagan,  said 6 weeks IV Rocephin started 6/30/21. Has PICC line.

## 2021-07-31 LAB
GLUCOSE BLD-MCNC: 120 MG/DL (ref 70–108)
GLUCOSE BLD-MCNC: 126 MG/DL (ref 70–108)
GLUCOSE BLD-MCNC: 131 MG/DL (ref 70–108)
GLUCOSE BLD-MCNC: 140 MG/DL (ref 70–108)

## 2021-07-31 PROCEDURE — 6370000000 HC RX 637 (ALT 250 FOR IP): Performed by: NURSE PRACTITIONER

## 2021-07-31 PROCEDURE — 6370000000 HC RX 637 (ALT 250 FOR IP): Performed by: INTERNAL MEDICINE

## 2021-07-31 PROCEDURE — 2580000003 HC RX 258: Performed by: INTERNAL MEDICINE

## 2021-07-31 PROCEDURE — 82948 REAGENT STRIP/BLOOD GLUCOSE: CPT

## 2021-07-31 PROCEDURE — 99232 SBSQ HOSP IP/OBS MODERATE 35: CPT | Performed by: NURSE PRACTITIONER

## 2021-07-31 PROCEDURE — 6360000002 HC RX W HCPCS: Performed by: INTERNAL MEDICINE

## 2021-07-31 PROCEDURE — 1200000000 HC SEMI PRIVATE

## 2021-07-31 PROCEDURE — 97116 GAIT TRAINING THERAPY: CPT

## 2021-07-31 PROCEDURE — 97110 THERAPEUTIC EXERCISES: CPT

## 2021-07-31 RX ADMIN — HYDROCODONE BITARTRATE AND ACETAMINOPHEN 1 TABLET: 5; 325 TABLET ORAL at 00:03

## 2021-07-31 RX ADMIN — GABAPENTIN 100 MG: 100 CAPSULE ORAL at 08:55

## 2021-07-31 RX ADMIN — HYDROCODONE BITARTRATE AND ACETAMINOPHEN 1 TABLET: 5; 325 TABLET ORAL at 08:54

## 2021-07-31 RX ADMIN — APIXABAN 5 MG: 5 TABLET, FILM COATED ORAL at 08:56

## 2021-07-31 RX ADMIN — METOPROLOL TARTRATE 75 MG: 25 TABLET, FILM COATED ORAL at 21:30

## 2021-07-31 RX ADMIN — APIXABAN 5 MG: 5 TABLET, FILM COATED ORAL at 21:31

## 2021-07-31 RX ADMIN — PANTOPRAZOLE SODIUM 40 MG: 40 TABLET, DELAYED RELEASE ORAL at 06:23

## 2021-07-31 RX ADMIN — GABAPENTIN 100 MG: 100 CAPSULE ORAL at 13:26

## 2021-07-31 RX ADMIN — CYCLOBENZAPRINE 10 MG: 10 TABLET, FILM COATED ORAL at 08:55

## 2021-07-31 RX ADMIN — DOCUSATE SODIUM 100 MG: 100 CAPSULE ORAL at 21:31

## 2021-07-31 RX ADMIN — LABETALOL HYDROCHLORIDE 100 MG: 100 TABLET, FILM COATED ORAL at 08:55

## 2021-07-31 RX ADMIN — ISOSORBIDE MONONITRATE 30 MG: 30 TABLET ORAL at 08:55

## 2021-07-31 RX ADMIN — SODIUM CHLORIDE, PRESERVATIVE FREE 10 ML: 5 INJECTION INTRAVENOUS at 08:56

## 2021-07-31 RX ADMIN — CITALOPRAM 10 MG: 20 TABLET, FILM COATED ORAL at 08:55

## 2021-07-31 RX ADMIN — INSULIN GLARGINE 30 UNITS: 100 INJECTION, SOLUTION SUBCUTANEOUS at 21:33

## 2021-07-31 RX ADMIN — HYDROCODONE BITARTRATE AND ACETAMINOPHEN 1 TABLET: 5; 325 TABLET ORAL at 16:43

## 2021-07-31 RX ADMIN — BUSPIRONE HYDROCHLORIDE 5 MG: 5 TABLET ORAL at 21:31

## 2021-07-31 RX ADMIN — METOPROLOL TARTRATE 75 MG: 25 TABLET, FILM COATED ORAL at 08:55

## 2021-07-31 RX ADMIN — Medication 400 MG: at 08:55

## 2021-07-31 RX ADMIN — FUROSEMIDE 20 MG: 40 TABLET ORAL at 08:55

## 2021-07-31 RX ADMIN — GABAPENTIN 100 MG: 100 CAPSULE ORAL at 21:31

## 2021-07-31 RX ADMIN — SENNOSIDES 8.6 MG: 8.6 TABLET, COATED ORAL at 21:31

## 2021-07-31 RX ADMIN — CEFTRIAXONE SODIUM 1000 MG: 1 INJECTION, POWDER, FOR SOLUTION INTRAMUSCULAR; INTRAVENOUS at 20:13

## 2021-07-31 RX ADMIN — BUSPIRONE HYDROCHLORIDE 5 MG: 5 TABLET ORAL at 10:08

## 2021-07-31 RX ADMIN — ATORVASTATIN CALCIUM 40 MG: 40 TABLET, FILM COATED ORAL at 21:31

## 2021-07-31 RX ADMIN — CYCLOBENZAPRINE 10 MG: 10 TABLET, FILM COATED ORAL at 21:31

## 2021-07-31 RX ADMIN — AMLODIPINE BESYLATE 5 MG: 5 TABLET ORAL at 08:56

## 2021-07-31 RX ADMIN — RAMIPRIL 20 MG: 10 CAPSULE ORAL at 08:55

## 2021-07-31 RX ADMIN — AMIODARONE HYDROCHLORIDE 200 MG: 200 TABLET ORAL at 08:56

## 2021-07-31 RX ADMIN — DOCUSATE SODIUM 100 MG: 100 CAPSULE ORAL at 08:55

## 2021-07-31 ASSESSMENT — PAIN DESCRIPTION - ORIENTATION
ORIENTATION: RIGHT
ORIENTATION: RIGHT

## 2021-07-31 ASSESSMENT — PAIN DESCRIPTION - PROGRESSION

## 2021-07-31 ASSESSMENT — PAIN - FUNCTIONAL ASSESSMENT
PAIN_FUNCTIONAL_ASSESSMENT: ACTIVITIES ARE NOT PREVENTED
PAIN_FUNCTIONAL_ASSESSMENT: PREVENTS OR INTERFERES SOME ACTIVE ACTIVITIES AND ADLS

## 2021-07-31 ASSESSMENT — PAIN DESCRIPTION - LOCATION
LOCATION: LEG
LOCATION: LEG

## 2021-07-31 ASSESSMENT — PAIN DESCRIPTION - ONSET
ONSET: ON-GOING
ONSET: ON-GOING

## 2021-07-31 ASSESSMENT — PAIN SCALES - GENERAL
PAINLEVEL_OUTOF10: 8
PAINLEVEL_OUTOF10: 5
PAINLEVEL_OUTOF10: 6
PAINLEVEL_OUTOF10: 7
PAINLEVEL_OUTOF10: 6
PAINLEVEL_OUTOF10: 8

## 2021-07-31 ASSESSMENT — PAIN DESCRIPTION - DESCRIPTORS
DESCRIPTORS: STABBING;BURNING
DESCRIPTORS: ACHING

## 2021-07-31 ASSESSMENT — PAIN DESCRIPTION - FREQUENCY
FREQUENCY: CONTINUOUS
FREQUENCY: CONTINUOUS

## 2021-07-31 ASSESSMENT — PAIN DESCRIPTION - PAIN TYPE
TYPE: ACUTE PAIN
TYPE: ACUTE PAIN

## 2021-07-31 NOTE — PROGRESS NOTES
[address] [address]  INPATIENT PHYSICAL THERAPY  DAILY NOTE  [facility] ORTHOPEDICS 7K - 7K-05/005-A    Time In: 7237  Time Out: 4089  Timed Code Treatment Minutes: 24 Minutes  Minutes: 24          Date: 2021  Patient Name: Ricardo Dey,  Gender:  female        MRN: [id number]  : 1940  ([de-identified] y.o.)     Referring Practitioner: Sarah Willett MD  Diagnosis: UTI  Additional Pertinent Hx: Per HPI: Bryce Villasenor is a [de-identified] y.o. female who presents to the emergency department for evaluation of bilateral leg weakness. The patient states after her morning routine yesterday morning she noticed increased weakness when walking and is now unable to walk with her walker. She underwent low back surgery a month ago and states that since the surgery she has been having trouble walking and has had to ambulate using a walker. She also reports new paresthesias bilaterally in both lower extremities. She denies chest pain, shortness of breath, diaphoresis, diarrhea, or constipation. \"     Prior Level of Function:  Lives With: Daughter (daughter lives with pt)  Type of Home: Mobile home  Home Layout: One level  Home Access: Stairs to enter with rails (platform steps wher she can put her walker up on)  Entrance Stairs - Number of Steps: 4  Home Equipment: Rolling walker, 4 wheeled walker, Cane, [address] [facility] Shower/Tub: Walk-in shower, Shower chair with back  Bathroom Toilet: Handicap height    ADL Assistance: Independent  Ambulation Assistance: Needs assistance (pt states on her last day at [facility] she was walking independently with walker, but that was the first time, and pt got much weaker following that)  Transfer Assistance: Independent  Active : No  Additional Comments: pt states she left the SNF [facility]) last Friday and felt she was not ready to go home yet. Pt stating when she left SNF, she was able to complete her ADL tasks indep.  Pt stating she was a \"nervous wreck\" going home d/t not feeling ready and the need to change her PICC line dressing everyday. Pt was suppose to have [facility] therapy but never had a chance to initiate d/t being home less than 24 hours. Restrictions/Precautions:  Restrictions/Precautions: General Precautions, Fall Risk  Position Activity Restriction  Spinal Precautions: No Bending, No Lifting, No Twisting  Other position/activity restrictions: hx lumbar laminectomy L2-L3 and osteomyelitis tx with abx     SUBJECTIVE: RN approved therapy session. Patient supine in bed upon arrival. Patient pleasant and agreeable to therapy. Patient requested to use restroom before gait training. PAIN: 0/10:     Vitals: Vitals not assessed per clinical judgement, see nursing flowsheet    OBJECTIVE:  Bed Mobility:  Supine to Sit: Stand By Assistance, with head of bed raised, with increased time for completion    Transfers:  Sit to Stand: [address], with increased time for completion  Stand to [facility], with increased time for completion   Patient performed sit<>stand from EOB and toilet. Ambulation:  Contact Guard Assistance  Distance: 10'x1, 20'x1. Seated rest break between trials. Surface: Level Tile  Device:Rolling Walker  Gait Deviations: Forward Flexed Posture, Slow Daxa, Decreased Step Length Bilaterally, Decreased Gait Speed, Decreased Heel Strike Bilaterally and Patient diego LE demonstrated excessive knee flexion when becoming fatigued. Patient needed to sit due to feeling her knees were going to buckle. Balance:  Dynamic Standing Balance: Contact Guard Assistance, Patient able to complete pericare and handwashing with 1 UE support on rolling walker with CGA from therapist.    Exercise:  Patient was guided in 1 set(s) 15 reps of exercise to both lower extremities. Seated marches, Seated heel/toe raises, Long arc quads and Seated abduction/adduction.   Exercises were completed for increased independence with functional mobility. Functional Outcome Measures: Completed  AM-PAC Inpatient Mobility Raw Score : 16  AM-PAC Inpatient T-Scale Score : 40.78    ASSESSMENT:  Assessment: Patient progressing toward established goals. Activity Tolerance:  Patient tolerance of  treatment: good. Patient limited by decreased strength and decreased endurance. Equipment Recommendations:Equipment Needed: No  Discharge Recommendations:  Subacute/Skilled Nursing Facility    Plan: Times per week: 6 x O  Times per day: Daily  Current Treatment Recommendations: Strengthening, Neuromuscular Re-education, Home Exercise Program, Safety Education & Training, Balance Training, Endurance Training, Patient/Caregiver Education & Training, Functional Mobility Training, Transfer Training, Gait Training, Stair training    Patient Education  Patient Education: Plan of Care, Gait, Verbal Exercise Instruction    Goals:  Patient goals : go somewhere for therapy, find out what is causing leg weakness  Short term goals  Time Frame for Short term goals: by hospital discharge  Short term goal 1: Supine to/from sit, using log roll, with modified independence for ease of transfers at discharge site. Short term goal 2: Sit to/from stand with modified independence for ease of transfers at discharge site. Short term goal 3: Improve 5x STS time to less than 14.8 seconds to reflect age appropriate norms, indicating improved functional strength and endurance. Short term goal 4: Ambulate 48' with RW and modified independence for home distance ambulation. Short term goal 5: Ascend/descend 4 steps with BHR with modified independence for entry into home. Long term goals  Time Frame for Long term goals : N/A due to short ELOS. Following session, patient left in safe position with all fall risk precautions in place.

## 2021-07-31 NOTE — PROGRESS NOTES
Hospitalist Progress Note    Patient:  Vaishnavi Chapman      Unit/Bed:7K-05/005-A    YOB: 1940    MRN: [id number]       Acct: [de-identified]     PCP: Gavi Floyd MD    Date of Admission: 7/24/2021    Assessment/Plan:    1. Physical deconditioning/debility--likely secondary to age and recent surgery; PT/OT, social work is assisting with placement  2. Hypokalemia--resolved with replacement  3. Osteomyelitis L2-L3 status post laminectomy--appreciate neurosurgical input; on Rocephin~infectious disease will not see as pt follows with Dr Tomy Perez; appears patient to be on Rocephin for total of 6 weeks~per review of old records from [facility] the patient appears to have been started on Rocephin on 6/30 for total of 6 weeks and appears to end on August 11  4. Diabetes mellitus type 2, uncontrolled--on Lantus along with low-dose sliding scale, monitor  5. Essential hypertension, uncontrolled--on Norvasc, Lopressor, Lasix Imdur, Altace, monitor  6. Chronic diastolic heart failure--on Lopressor, Lasix  7. VHD with moderate tricuspid regurgitation/mild aortic stenosis  8. Atrial fibrillation--on Eliquis, amiodarone  9. Normocytic anemia  10. Moderate malnutrition--per dietitian    Expected discharge date: Pending placement and clinical progress    Disposition:    [] Home       [] TCU       [] Rehab       [] Psych       [x] SNF       [] [place]       [] Other-    Chief Complaint: Inability to care for herself    Hospital Course: Per progress note dated 7/27: Lubna Gregory a [de-identified] y.o. female who presents to the emergency department for evaluation of bilateral leg weakness.  The patient states after her morning routine yesterday morning she noticed increased weakness when walking and is now unable to walk with her walker.  She underwent low back surgery a month ago and states that since the surgery she has been having trouble walking and has had to ambulate using a walker.  She also reports new paresthesias bilaterally in both lower extremities.  She denies chest pain, shortness of breath, diaphoresis, diarrhea, or constipation. The patient has no other acute complaints at this time. MRI lumbar spine 2 weeks agp showed discitis and   osteomyelitis at L2-L3, laminectomy L2-3 with possible abscess posteriorly external to the   spinal canal, no insignificant interval changes noted overall. Dr Rob Luciano and DR Terry Rae   consulted who both agree patient does not need any surgical intervention at this time. Patient was to discharge previousy on 6 wks of antiobiotic however this was not done at   [facility]. Patient has PICC. Patient is feeling much improved, labs stable, afebrile. Patient   working with therapy. Dr Terry Rae wrote for Rx for Ceftriaxone and this will be continued for   6 wks.  Patient stable for d/c to SNF,\"    7/28--> hemodynamically stable, laboratory studies are stable except hemoglobin at 9.4 and this morning her glucose was 67    7/29--> infectious disease will not see patient as the patient follows with another ID doctor at [facility] so attempting to obtain medical records; hemodynamically stable, glucose at 97 this morning    7/30--> old records from [facility] reviewed and timeframe was established for Rocephin; hemodynamically stable; awaiting plan from  regarding placement    7/31--> hemodynamically stable, sugars past 24 hours ranged from 120-271    Subjective (past 24 hours): complains of right thigh pain rates 8 out of 10 and states it is a chronic issue; eating well and bowels moved; patient aware of needing Rocephin for approximately 2 more weeks and awaiting placement    Medications:  Reviewed    Infusion Medications    dextrose      sodium chloride       Scheduled Medications    senna  1 tablet Oral Nightly    polyethylene glycol  17 g Oral Daily    pantoprazole  40 mg Oral QAM AC    insulin lispro  0-6 Units Subcutaneous TID WC    insulin lispro  0-3 Units Subcutaneous Nightly    busPIRone  5 mg Oral BID    citalopram  10 mg Oral Daily    docusate sodium  100 mg Oral BID    gabapentin  100 mg Oral TID    amiodarone  200 mg Oral Daily    amLODIPine  5 mg Oral Daily    apixaban  5 mg Oral BID    atorvastatin  40 mg Oral Nightly    cyclobenzaprine  10 mg Oral BID    isosorbide mononitrate  30 mg Oral Daily    labetalol  100 mg Oral BID    insulin glargine  30 Units Subcutaneous Nightly    magnesium oxide  400 mg Oral Daily    metoprolol tartrate  75 mg Oral BID    potassium chloride  10 mEq Oral Q MWF    ramipril  20 mg Oral Daily    sodium chloride flush  10 mL Intravenous 2 times per day    cefTRIAXone (ROCEPHIN) IV  1,000 mg Intravenous Q24H    furosemide  20 mg Oral Daily     PRN Meds: glucose, dextrose, glucagon (rDNA), dextrose, sodium chloride flush, sodium chloride, ondansetron **OR** ondansetron, polyethylene glycol, acetaminophen **OR** acetaminophen, potassium chloride **OR** potassium alternative oral replacement **OR** potassium chloride, magnesium sulfate, HYDROcodone 5 mg - acetaminophen      Intake/Output Summary (Last 24 hours) at 7/31/2021 0611  Last data filed at 7/31/2021 0451  Gross per 24 hour   Intake 937.7 ml   Output 0 ml   Net 937.7 ml       Diet:  ADULT DIET; Regular; 4 carb choices (60 gm/meal); Low Sodium (2 gm)  Adult Oral Nutrition Supplement; Diabetic Oral Supplement  Adult Oral Nutrition Supplement; Other Oral Supplement; activa tid with meals    Exam:  /68   Pulse 57   Temp 97.7 °F (36.5 °C) (Oral)   Resp 16   Ht 5' 4\" (1.626 m)   Wt 174 lb 2.6 oz (79 kg)   SpO2 98%   BMI 29.90 kg/m²     General appearance: No apparent distress, appears stated age and cooperative. HEENT: Pupils equal, round, and reactive to light. Conjunctivae/corneas clear. Neck: Supple, with full range of motion. No jugular venous distention. Trachea midline. Respiratory:  Normal respiratory effort.  Clear to auscultation, bilaterally without Rales/Wheezes/Rhonchi. Cardiovascular: Regular rate and rhythm with normal S1/S2; (+) murmur noted. Abdomen: Soft, non-tender, non-distended with normal bowel sounds. Musculoskeletal: passive and active ROM x 4 extremities. Skin: Skin color, texture, turgor normal.    Neurologic:  Neurovascularly intact without any focal sensory/motor deficits. Cranial nerves: II-XII intact, grossly non-focal.  Psychiatric: Alert and oriented, thought content appropriate  Capillary Refill: Brisk,< 3 seconds   Peripheral Pulses: +2 palpable, equal bilaterally       Labs:   Recent Labs     07/28/21 0630 07/30/21  0600   WBC 5.5 4.8   HGB 9.4* 9.0*   HCT 31.1* 30.3*    330     Recent Labs     07/28/21 0630 07/30/21  0600    140   K 3.8 3.8    100   CO2 31 31   BUN 11 11   CREATININE 0.5 0.5   CALCIUM 9.5 9.2     Microbiology:    None    Radiology:  MRI LUMBAR SPINE W WO CONTRAST    Result Date: 7/25/2021  PROCEDURE: MRI LUMBAR SPINE W WO CONTRAST CLINICAL INFORMATION: Lumbar diskitis; LE weakness. COMPARISON: Report of previous  MRI scan dated 6/29/2021. . TECHNIQUE: Sagittal and axial T1 and T2-weighted images were obtained to the lumbar spine. Postcontrast axial and sagittal T1-weighted images were also obtained. FINDINGS: There is abnormal signal intensity in approximately the L1 vertebral body,, superior aspect of the L2 vertebral body, T12-L1 and L1-2 disc spaces consistent with vertebral body osteomyelitis and disc space infection. There is possible previous instrumentation at L1-2. There is abnormal signal intensity enhancement the soft tissues dorsally at L1 and L2. The lumbar vertebral bodies are normally aligned. There is normal marrow signal throughout. There is no bone marrow edema. There are no compression fractures. No pars defects are noted. The discs have normal signal throughout.   The visualized aspects of the distal spinal cord are normal. The nerve roots of the cauda equina and the tip of the conus are normal. There are no gross abnormalities in the distal thoracic spine. On the axial images, at T12-L1, there is mild canal and moderate bilateral from stenosis At L1-L2, there is mild canal and moderate to severe bilateral foraminal stenosis. At L2-3, there is mild to moderate canal and moderate bilateral foraminal stenosis. At L3-4, there is moderate canal and moderate to severe bilateral foraminal stenosis At L4-5, there is mild-to-moderate canal and moderate bilateral foraminal stenosis At L5-S1, there is mild canal and moderate bilateral foraminal stenosis. There is degenerative change involving the sacroiliac joints bilaterally. There is no abnormal enhancement. There is atrophy involving the iliacus, psoas and paraspinal muscles bilaterally. There is a left hip replacement. 1. Previous instrumentation at L1-2. 2. Abnormal signal intensity within the L1 vertebral body and superior aspect of the L2 vertebral body consistent with osteomyelitis. 3. Abnormal signal intensity within the T12-L1 and L1-2 disc spaces, consistent with disc space infection. 4. Abnormal signal intensity and enhancement in the soft tissues dorsally at L1 and L2 consistent with postoperative and possible inflammatory changes. 5. There is mild-to-moderate canal and moderate bilateral from stenosis at L2-3 and L4-5. 6. There is moderate canal and moderate to severe bilateral foraminal stenosis at L3-4. 7. There is mild canal and moderate severe bilateral foraminal stenosis at L1-2. 8. There is mild canal and moderate bilateral foraminal stenosis at T12-L1 and L5-S1. 9. There is degenerative change involving the sacroiliac joints bilaterally. 10. There is atrophy involving the because, psoas and paraspinal muscles bilaterally. 11. There is a left hip replacement. **This report has been created using voice recognition software.  It may contain minor errors which are inherent in voice recognition technology. ** Final report electronically signed by DR Brigette Tabor on 7/25/2021 1:34 PM    XR CHEST 1 VIEW    Result Date: 7/25/2021  PROCEDURE: XR CHEST 1 VIEW CLINICAL INFORMATION: picc line placement COMPARISON: No prior study. TECHNIQUE:  AP chest single view  FINDINGS: There is a right upper extremity PICC present with the tip overlying the cavoatrial junction. The heart size is normal. No focal consolidation, pleural effusion or pneumothorax is seen. Left shoulder joint arthrosis is noted. 1. Right upper extremity PICC tip projects over the cavoatrial junction. **This report has been created using voice recognition software. It may contain minor errors which are inherent in voice recognition technology. ** Final report electronically signed by Dr. Tiffani Griffin on 7/25/2021 10:30 AM      DVT prophylaxis: [] Lovenox                                 [] SCDs                                 [] SQ Heparin                                 [] Encourage ambulation           [x] Already on Anticoagulation~Eliquis     Code Status: DNR-CCA    Tele:   [] yes             [x] no    Active Hospital Problems    Diagnosis Date Noted    Moderate malnutrition ([address] [E44.0] 07/28/2021     Class: Acute    Weakness [R53.1] 07/26/2021    UTI (urinary tract infection) [N39.0] 07/24/2021       Electronically signed by BRENDAN Nelson CNP on 7/31/2021 at 6:11 AM

## 2021-07-31 NOTE — PLAN OF CARE
Problem: Falls - Risk of:  Goal: Will remain free from falls  Description: Will remain free from falls  7/31/2021 1035 by Iman Urban RN  Outcome: Met This Shift  Note: No falls noted this shift. Patient ambulates with x1 staff assistance without difficulty. Bed kept in low position. Safe environment maintained. Bedside table & call light in reach. Uses call light appropriately when needing assistance. Problem: Skin Integrity:  Goal: Absence of new skin breakdown  Description: Absence of new skin breakdown  7/31/2021 1035 by Iman Urban RN  Outcome: Met This Shift  Note: No new skin breakdown this shift     Problem: Nutrition  Goal: Optimal nutrition therapy  7/31/2021 1035 by Iman Urban RN  Outcome: Met This Shift  Note: Pt complete % of all meals     Problem: Daily Care:  Goal: Daily care needs are met  Description: Daily care needs are met  7/31/2021 1035 by Iman Urban RN  Outcome: Met This Shift  Note: Pt needs minimal assist when completing ADLs     Problem: Pain:  Goal: Pain level will decrease  Description: Pain level will decrease  7/31/2021 1035 by Iman Urban RN  Outcome: Ongoing  Note: Pt report pain at 8 on scale. Pt states oral medication helping to achieve pain goal of a 3 on scale. Problem: DISCHARGE BARRIERS  Goal: Patient's continuum of care needs are met  7/31/2021 1035 by Iman Urban RN  Outcome: Ongoing  Note: Pt plans [facility] pending precert at discharge. Care manager and social working helping with discharge needs. Care plan reviewed with patient. Patient verbalize understanding of the plan of care and contribute to goal setting.

## 2021-08-01 LAB
GLUCOSE BLD-MCNC: 131 MG/DL (ref 70–108)
GLUCOSE BLD-MCNC: 191 MG/DL (ref 70–108)
GLUCOSE BLD-MCNC: 229 MG/DL (ref 70–108)
GLUCOSE BLD-MCNC: 72 MG/DL (ref 70–108)

## 2021-08-01 PROCEDURE — 6360000002 HC RX W HCPCS: Performed by: INTERNAL MEDICINE

## 2021-08-01 PROCEDURE — 6370000000 HC RX 637 (ALT 250 FOR IP): Performed by: NURSE PRACTITIONER

## 2021-08-01 PROCEDURE — 82948 REAGENT STRIP/BLOOD GLUCOSE: CPT

## 2021-08-01 PROCEDURE — 6370000000 HC RX 637 (ALT 250 FOR IP): Performed by: INTERNAL MEDICINE

## 2021-08-01 PROCEDURE — 99232 SBSQ HOSP IP/OBS MODERATE 35: CPT | Performed by: NURSE PRACTITIONER

## 2021-08-01 PROCEDURE — 2580000003 HC RX 258: Performed by: INTERNAL MEDICINE

## 2021-08-01 PROCEDURE — 1200000000 HC SEMI PRIVATE

## 2021-08-01 RX ADMIN — APIXABAN 5 MG: 5 TABLET, FILM COATED ORAL at 10:03

## 2021-08-01 RX ADMIN — HYDROCODONE BITARTRATE AND ACETAMINOPHEN 1 TABLET: 5; 325 TABLET ORAL at 00:51

## 2021-08-01 RX ADMIN — HYDROCODONE BITARTRATE AND ACETAMINOPHEN 1 TABLET: 5; 325 TABLET ORAL at 11:19

## 2021-08-01 RX ADMIN — GABAPENTIN 100 MG: 100 CAPSULE ORAL at 21:42

## 2021-08-01 RX ADMIN — ISOSORBIDE MONONITRATE 30 MG: 30 TABLET ORAL at 10:02

## 2021-08-01 RX ADMIN — AMLODIPINE BESYLATE 5 MG: 5 TABLET ORAL at 10:03

## 2021-08-01 RX ADMIN — AMIODARONE HYDROCHLORIDE 200 MG: 200 TABLET ORAL at 10:03

## 2021-08-01 RX ADMIN — HYDROCODONE BITARTRATE AND ACETAMINOPHEN 1 TABLET: 5; 325 TABLET ORAL at 18:58

## 2021-08-01 RX ADMIN — CYCLOBENZAPRINE 10 MG: 10 TABLET, FILM COATED ORAL at 21:42

## 2021-08-01 RX ADMIN — FUROSEMIDE 20 MG: 40 TABLET ORAL at 10:02

## 2021-08-01 RX ADMIN — GABAPENTIN 100 MG: 100 CAPSULE ORAL at 10:02

## 2021-08-01 RX ADMIN — DOCUSATE SODIUM 100 MG: 100 CAPSULE ORAL at 10:02

## 2021-08-01 RX ADMIN — SENNOSIDES 8.6 MG: 8.6 TABLET, COATED ORAL at 21:42

## 2021-08-01 RX ADMIN — SODIUM CHLORIDE, PRESERVATIVE FREE 10 ML: 5 INJECTION INTRAVENOUS at 10:04

## 2021-08-01 RX ADMIN — PANTOPRAZOLE SODIUM 40 MG: 40 TABLET, DELAYED RELEASE ORAL at 06:01

## 2021-08-01 RX ADMIN — CITALOPRAM 10 MG: 20 TABLET, FILM COATED ORAL at 11:11

## 2021-08-01 RX ADMIN — CEFTRIAXONE SODIUM 1000 MG: 1 INJECTION, POWDER, FOR SOLUTION INTRAMUSCULAR; INTRAVENOUS at 18:58

## 2021-08-01 RX ADMIN — SODIUM CHLORIDE, PRESERVATIVE FREE 10 ML: 5 INJECTION INTRAVENOUS at 21:43

## 2021-08-01 RX ADMIN — RAMIPRIL 20 MG: 10 CAPSULE ORAL at 10:02

## 2021-08-01 RX ADMIN — INSULIN LISPRO 2 UNITS: 100 INJECTION, SOLUTION INTRAVENOUS; SUBCUTANEOUS at 16:34

## 2021-08-01 RX ADMIN — Medication 400 MG: at 10:02

## 2021-08-01 RX ADMIN — BUSPIRONE HYDROCHLORIDE 5 MG: 5 TABLET ORAL at 10:03

## 2021-08-01 RX ADMIN — GABAPENTIN 100 MG: 100 CAPSULE ORAL at 14:07

## 2021-08-01 RX ADMIN — CYCLOBENZAPRINE 10 MG: 10 TABLET, FILM COATED ORAL at 10:02

## 2021-08-01 RX ADMIN — DOCUSATE SODIUM 100 MG: 100 CAPSULE ORAL at 21:42

## 2021-08-01 RX ADMIN — APIXABAN 5 MG: 5 TABLET, FILM COATED ORAL at 21:42

## 2021-08-01 RX ADMIN — ATORVASTATIN CALCIUM 40 MG: 40 TABLET, FILM COATED ORAL at 21:42

## 2021-08-01 RX ADMIN — METOPROLOL TARTRATE 75 MG: 25 TABLET, FILM COATED ORAL at 10:02

## 2021-08-01 RX ADMIN — BUSPIRONE HYDROCHLORIDE 5 MG: 5 TABLET ORAL at 21:42

## 2021-08-01 ASSESSMENT — PAIN DESCRIPTION - PROGRESSION
CLINICAL_PROGRESSION: NOT CHANGED
CLINICAL_PROGRESSION: GRADUALLY WORSENING
CLINICAL_PROGRESSION: NOT CHANGED

## 2021-08-01 ASSESSMENT — PAIN SCALES - GENERAL
PAINLEVEL_OUTOF10: 5
PAINLEVEL_OUTOF10: 10
PAINLEVEL_OUTOF10: 6
PAINLEVEL_OUTOF10: 5
PAINLEVEL_OUTOF10: 6

## 2021-08-01 ASSESSMENT — PAIN DESCRIPTION - ORIENTATION: ORIENTATION: RIGHT

## 2021-08-01 ASSESSMENT — PAIN DESCRIPTION - FREQUENCY: FREQUENCY: CONTINUOUS

## 2021-08-01 ASSESSMENT — PAIN DESCRIPTION - PAIN TYPE: TYPE: CHRONIC PAIN

## 2021-08-01 ASSESSMENT — PAIN DESCRIPTION - ONSET: ONSET: ON-GOING

## 2021-08-01 ASSESSMENT — PAIN DESCRIPTION - DESCRIPTORS: DESCRIPTORS: ACHING;SORE

## 2021-08-01 ASSESSMENT — PAIN DESCRIPTION - LOCATION: LOCATION: LEG

## 2021-08-01 ASSESSMENT — PAIN - FUNCTIONAL ASSESSMENT: PAIN_FUNCTIONAL_ASSESSMENT: PREVENTS OR INTERFERES SOME ACTIVE ACTIVITIES AND ADLS

## 2021-08-01 NOTE — PROGRESS NOTES
[facility]  OCCUPATIONAL THERAPY MISSED TREATMENT NOTE  [facility] 7K  7K-05/005-A      Date: 2021  Patient Name: Ricardo Dey        CSN: [id number]   : 1940  ([de-identified] y.o.)  Gender: female   Referring Practitioner: Dr. Ami Walsh. Taj  Diagnosis: UTI         REASON FOR MISSED TREATMENT: Pt. Nurse approved OT, upon entering Pt. Declined treatment this date reports she just got comfortable in the bed and does not wish to participate at this time. Will try back as time allows.

## 2021-08-01 NOTE — PLAN OF CARE
Problem: Pain:  Goal: Pain level will decrease  Description: Pain level will decrease  Note: Pain well controlled with po prn pain medication. Problem: Falls - Risk of:  Goal: Will remain free from falls  Description: Will remain free from falls  Note: Bed in lowest locked position, call light within reach. Problem: Skin Integrity:  Goal: Absence of new skin breakdown  Description: Absence of new skin breakdown  Note: Assisted with turning and repositioning. Problem: DISCHARGE BARRIERS  Goal: Patient's continuum of care needs are met  Note: Awaiting per cert. Problem: Nutrition  Goal: Optimal nutrition therapy  Note: Reports fair appetite.

## 2021-08-01 NOTE — PROGRESS NOTES
Hospitalist Progress Note    Patient:  González Elizondo      Unit/Bed:7K-05/005-A    YOB: 1940    MRN: [id number]       Acct: [de-identified]     PCP: Thea Paul MD    Date of Admission: 7/24/2021    Assessment/Plan:    1. Physical deconditioning/debility--likely secondary to age and recent surgery; PT/OT, social work is assisting with placement  2. Hypokalemia--resolved with replacement  3. Osteomyelitis L2-L3 status post laminectomy--appreciate neurosurgical input; on Rocephin~infectious disease will not see as pt follows with Dr Fred Andrews; appears patient to be on Rocephin for total of 6 weeks~per review of old records from [facility] the patient appears to have been started on Rocephin on 6/30 for total of 6 weeks and appears to end on August 11  4. Diabetes mellitus type 2, uncontrolled--on Lantus along with low-dose sliding scale, monitor  5. Essential hypertension, uncontrolled--on Norvasc, Lopressor, Lasix Imdur, Altace, monitor  6. Chronic diastolic heart failure--on Lopressor, Lasix  7. VHD with moderate tricuspid regurgitation/mild aortic stenosis  8. Atrial fibrillation--on Eliquis, amiodarone  9. Normocytic anemia  10. Moderate malnutrition--per dietitian    Expected discharge date: Pending placement and clinical progress    Disposition:    [] Home       [] TCU       [] Rehab       [] Psych       [x] SNF       [] [place]       [] Other-    Chief Complaint: Inability to care for herself    Hospital Course: Per progress note dated 7/27: Wilbert Acosta a [de-identified] y.o. female who presents to the emergency department for evaluation of bilateral leg weakness.  The patient states after her morning routine yesterday morning she noticed increased weakness when walking and is now unable to walk with her walker.  She underwent low back surgery a month ago and states that since the surgery she has been having trouble walking and has had to ambulate using a walker.  She also reports new paresthesias bilaterally in both lower extremities.  She denies chest pain, shortness of breath, diaphoresis, diarrhea, or constipation. The patient has no other acute complaints at this time. MRI lumbar spine 2 weeks agp showed discitis and   osteomyelitis at L2-L3, laminectomy L2-3 with possible abscess posteriorly external to the   spinal canal, no insignificant interval changes noted overall. Dr Mine Livingston and DR Kimberlee Peña   consulted who both agree patient does not need any surgical intervention at this time. Patient was to discharge previousy on 6 wks of antiobiotic however this was not done at   [facility]. Patient has PICC. Patient is feeling much improved, labs stable, afebrile. Patient   working with therapy. Dr Kimberlee Peña wrote for Rx for Ceftriaxone and this will be continued for   6 wks.  Patient stable for d/c to SNF,\"    7/28--> hemodynamically stable, laboratory studies are stable except hemoglobin at 9.4 and this morning her glucose was 67    7/29--> infectious disease will not see patient as the patient follows with another ID doctor at [facility] so attempting to obtain medical records; hemodynamically stable, glucose at 97 this morning    7/30--> old records from [facility] reviewed and timeframe was established for Rocephin; hemodynamically stable; awaiting plan from  regarding placement    7/31--> hemodynamically stable, sugars past 24 hours ranged from 120-271    8/1--> hemodynamically stable, sugars past 24 hours have ranged     Subjective (past 24 hours): Denies right thigh pain; eating well and bowels moved; eating well     Medications:  Reviewed    Infusion Medications    dextrose      sodium chloride       Scheduled Medications    senna  1 tablet Oral Nightly    polyethylene glycol  17 g Oral Daily    pantoprazole  40 mg Oral QAM AC    insulin lispro  0-6 Units Subcutaneous TID WC    insulin lispro  0-3 Units Subcutaneous Nightly    busPIRone  5 mg Oral BID    citalopram  10 mg Oral Daily    docusate sodium  100 mg Oral BID    gabapentin  100 mg Oral TID    amiodarone  200 mg Oral Daily    amLODIPine  5 mg Oral Daily    apixaban  5 mg Oral BID    atorvastatin  40 mg Oral Nightly    cyclobenzaprine  10 mg Oral BID    isosorbide mononitrate  30 mg Oral Daily    [Held by provider] labetalol  100 mg Oral BID    insulin glargine  30 Units Subcutaneous Nightly    magnesium oxide  400 mg Oral Daily    metoprolol tartrate  75 mg Oral BID    potassium chloride  10 mEq Oral Q MWF    ramipril  20 mg Oral Daily    sodium chloride flush  10 mL Intravenous 2 times per day    cefTRIAXone (ROCEPHIN) IV  1,000 mg Intravenous Q24H    furosemide  20 mg Oral Daily     PRN Meds: glucose, dextrose, glucagon (rDNA), dextrose, sodium chloride flush, sodium chloride, ondansetron **OR** ondansetron, polyethylene glycol, acetaminophen **OR** acetaminophen, potassium chloride **OR** potassium alternative oral replacement **OR** potassium chloride, magnesium sulfate, HYDROcodone 5 mg - acetaminophen      Intake/Output Summary (Last 24 hours) at 8/1/2021 0601  Last data filed at 7/31/2021 2010  Gross per 24 hour   Intake 600 ml   Output --   Net 600 ml       Diet:  ADULT DIET; Regular; 4 carb choices (60 gm/meal); Low Sodium (2 gm)  Adult Oral Nutrition Supplement; Diabetic Oral Supplement  Adult Oral Nutrition Supplement; Other Oral Supplement; activa tid with meals    Exam:  /68   Pulse 55   Temp 98.9 °F (37.2 °C) (Oral)   Resp 16   Ht 5' 4\" (1.626 m)   Wt 175 lb 14.8 oz (79.8 kg)   SpO2 93%   BMI 30.20 kg/m²     General appearance: No apparent distress, appears stated age and cooperative. HEENT: Pupils equal, round, and reactive to light. Conjunctivae/corneas clear. Neck: Supple, with full range of motion. No jugular venous distention. Trachea midline. Respiratory:  Normal respiratory effort.  Clear to auscultation, bilaterally without Rales/Wheezes/Rhonchi. Cardiovascular: Regular rate and rhythm with normal S1/S2; (+) murmur noted. Abdomen: Soft, non-tender, non-distended with normal bowel sounds. Musculoskeletal: passive and active ROM x 4 extremities. Skin: Skin color, texture, turgor normal.    Neurologic:  Neurovascularly intact without any focal sensory/motor deficits. Cranial nerves: II-XII intact, grossly non-focal.  Psychiatric: Alert and oriented, thought content appropriate  Capillary Refill: Brisk,< 3 seconds   Peripheral Pulses: +2 palpable, equal bilaterally       Labs:   Recent Labs     07/30/21  0600   WBC 4.8   HGB 9.0*   HCT 30.3*        Recent Labs     07/30/21  0600      K 3.8      CO2 31   BUN 11   CREATININE 0.5   CALCIUM 9.2     Microbiology:    None    Radiology:  MRI LUMBAR SPINE W WO CONTRAST    Result Date: 7/25/2021  PROCEDURE: MRI LUMBAR SPINE W WO CONTRAST CLINICAL INFORMATION: Lumbar diskitis; LE weakness. COMPARISON: Report of previous  MRI scan dated 6/29/2021. . TECHNIQUE: Sagittal and axial T1 and T2-weighted images were obtained to the lumbar spine. Postcontrast axial and sagittal T1-weighted images were also obtained. FINDINGS: There is abnormal signal intensity in approximately the L1 vertebral body,, superior aspect of the L2 vertebral body, T12-L1 and L1-2 disc spaces consistent with vertebral body osteomyelitis and disc space infection. There is possible previous instrumentation at L1-2. There is abnormal signal intensity enhancement the soft tissues dorsally at L1 and L2. The lumbar vertebral bodies are normally aligned. There is normal marrow signal throughout. There is no bone marrow edema. There are no compression fractures. No pars defects are noted. The discs have normal signal throughout.   The visualized aspects of the distal spinal cord are normal. The nerve roots of the cauda equina and the tip of the conus are normal. There are no gross abnormalities in the distal thoracic spine. On the axial images, at T12-L1, there is mild canal and moderate bilateral from stenosis At L1-L2, there is mild canal and moderate to severe bilateral foraminal stenosis. At L2-3, there is mild to moderate canal and moderate bilateral foraminal stenosis. At L3-4, there is moderate canal and moderate to severe bilateral foraminal stenosis At L4-5, there is mild-to-moderate canal and moderate bilateral foraminal stenosis At L5-S1, there is mild canal and moderate bilateral foraminal stenosis. There is degenerative change involving the sacroiliac joints bilaterally. There is no abnormal enhancement. There is atrophy involving the iliacus, psoas and paraspinal muscles bilaterally. There is a left hip replacement. 1. Previous instrumentation at L1-2. 2. Abnormal signal intensity within the L1 vertebral body and superior aspect of the L2 vertebral body consistent with osteomyelitis. 3. Abnormal signal intensity within the T12-L1 and L1-2 disc spaces, consistent with disc space infection. 4. Abnormal signal intensity and enhancement in the soft tissues dorsally at L1 and L2 consistent with postoperative and possible inflammatory changes. 5. There is mild-to-moderate canal and moderate bilateral from stenosis at L2-3 and L4-5. 6. There is moderate canal and moderate to severe bilateral foraminal stenosis at L3-4. 7. There is mild canal and moderate severe bilateral foraminal stenosis at L1-2. 8. There is mild canal and moderate bilateral foraminal stenosis at T12-L1 and L5-S1. 9. There is degenerative change involving the sacroiliac joints bilaterally. 10. There is atrophy involving the because, psoas and paraspinal muscles bilaterally. 11. There is a left hip replacement. **This report has been created using voice recognition software. It may contain minor errors which are inherent in voice recognition technology. ** Final report electronically signed by DR Lucina Rubio on 7/25/2021 1:34 PM    XR CHEST 1 VIEW    Result Date: 7/25/2021  PROCEDURE: XR CHEST 1 VIEW CLINICAL INFORMATION: picc line placement COMPARISON: No prior study. TECHNIQUE:  AP chest single view  FINDINGS: There is a right upper extremity PICC present with the tip overlying the cavoatrial junction. The heart size is normal. No focal consolidation, pleural effusion or pneumothorax is seen. Left shoulder joint arthrosis is noted. 1. Right upper extremity PICC tip projects over the cavoatrial junction. **This report has been created using voice recognition software. It may contain minor errors which are inherent in voice recognition technology. ** Final report electronically signed by Dr. Ritesh Melendez on 7/25/2021 10:30 AM      DVT prophylaxis: [] Lovenox                                 [] SCDs                                 [] SQ Heparin                                 [] Encourage ambulation           [x] Already on Anticoagulation~Eliquis     Code Status: DNR-CCA    Tele:   [] yes             [x] no    Active Hospital Problems    Diagnosis Date Noted    Moderate malnutrition ([address] [E44.0] 07/28/2021     Class: Acute    Weakness [R53.1] 07/26/2021    UTI (urinary tract infection) [N39.0] 07/24/2021       Electronically signed by BRENDAN Burton CNP on 8/1/2021 at 6:01 AM

## 2021-08-02 LAB
ANION GAP SERPL CALCULATED.3IONS-SCNC: 9 MEQ/L (ref 8–16)
BUN BLDV-MCNC: 11 MG/DL (ref 7–22)
CALCIUM SERPL-MCNC: 9.3 MG/DL (ref 8.5–10.5)
CHLORIDE BLD-SCNC: 102 MEQ/L (ref 98–111)
CO2: 28 MEQ/L (ref 23–33)
CREAT SERPL-MCNC: 0.5 MG/DL (ref 0.4–1.2)
ERYTHROCYTE [DISTWIDTH] IN BLOOD BY AUTOMATED COUNT: 15.8 % (ref 11.5–14.5)
ERYTHROCYTE [DISTWIDTH] IN BLOOD BY AUTOMATED COUNT: 51.8 FL (ref 35–45)
GFR SERPL CREATININE-BSD FRML MDRD: > 90 ML/MIN/1.73M2
GLUCOSE BLD-MCNC: 123 MG/DL (ref 70–108)
GLUCOSE BLD-MCNC: 133 MG/DL (ref 70–108)
GLUCOSE BLD-MCNC: 140 MG/DL (ref 70–108)
GLUCOSE BLD-MCNC: 97 MG/DL (ref 70–108)
HCT VFR BLD CALC: 31.3 % (ref 37–47)
HEMOGLOBIN: 9.2 GM/DL (ref 12–16)
MCH RBC QN AUTO: 26.4 PG (ref 26–33)
MCHC RBC AUTO-ENTMCNC: 29.4 GM/DL (ref 32.2–35.5)
MCV RBC AUTO: 89.7 FL (ref 81–99)
PLATELET # BLD: 323 THOU/MM3 (ref 130–400)
PMV BLD AUTO: 9.5 FL (ref 9.4–12.4)
POTASSIUM SERPL-SCNC: 3.9 MEQ/L (ref 3.5–5.2)
RBC # BLD: 3.49 MILL/MM3 (ref 4.2–5.4)
SODIUM BLD-SCNC: 139 MEQ/L (ref 135–145)
WBC # BLD: 6.3 THOU/MM3 (ref 4.8–10.8)

## 2021-08-02 PROCEDURE — 97110 THERAPEUTIC EXERCISES: CPT

## 2021-08-02 PROCEDURE — 36415 COLL VENOUS BLD VENIPUNCTURE: CPT

## 2021-08-02 PROCEDURE — 99232 SBSQ HOSP IP/OBS MODERATE 35: CPT | Performed by: NURSE PRACTITIONER

## 2021-08-02 PROCEDURE — 97530 THERAPEUTIC ACTIVITIES: CPT

## 2021-08-02 PROCEDURE — 2580000003 HC RX 258: Performed by: INTERNAL MEDICINE

## 2021-08-02 PROCEDURE — 6370000000 HC RX 637 (ALT 250 FOR IP): Performed by: INTERNAL MEDICINE

## 2021-08-02 PROCEDURE — 80048 BASIC METABOLIC PNL TOTAL CA: CPT

## 2021-08-02 PROCEDURE — 6370000000 HC RX 637 (ALT 250 FOR IP): Performed by: NURSE PRACTITIONER

## 2021-08-02 PROCEDURE — 6360000002 HC RX W HCPCS: Performed by: INTERNAL MEDICINE

## 2021-08-02 PROCEDURE — 82948 REAGENT STRIP/BLOOD GLUCOSE: CPT

## 2021-08-02 PROCEDURE — 1200000000 HC SEMI PRIVATE

## 2021-08-02 PROCEDURE — 97116 GAIT TRAINING THERAPY: CPT

## 2021-08-02 PROCEDURE — 85027 COMPLETE CBC AUTOMATED: CPT

## 2021-08-02 RX ADMIN — CEFTRIAXONE SODIUM 1000 MG: 1 INJECTION, POWDER, FOR SOLUTION INTRAMUSCULAR; INTRAVENOUS at 19:18

## 2021-08-02 RX ADMIN — ISOSORBIDE MONONITRATE 30 MG: 30 TABLET ORAL at 09:19

## 2021-08-02 RX ADMIN — FUROSEMIDE 20 MG: 40 TABLET ORAL at 09:19

## 2021-08-02 RX ADMIN — POTASSIUM CHLORIDE 10 MEQ: 750 TABLET, EXTENDED RELEASE ORAL at 09:17

## 2021-08-02 RX ADMIN — Medication 400 MG: at 12:20

## 2021-08-02 RX ADMIN — CYCLOBENZAPRINE 10 MG: 10 TABLET, FILM COATED ORAL at 09:17

## 2021-08-02 RX ADMIN — INSULIN GLARGINE 30 UNITS: 100 INJECTION, SOLUTION SUBCUTANEOUS at 21:48

## 2021-08-02 RX ADMIN — AMLODIPINE BESYLATE 5 MG: 5 TABLET ORAL at 09:18

## 2021-08-02 RX ADMIN — POLYETHYLENE GLYCOL 3350 17 G: 17 POWDER, FOR SOLUTION ORAL at 09:17

## 2021-08-02 RX ADMIN — APIXABAN 5 MG: 5 TABLET, FILM COATED ORAL at 21:42

## 2021-08-02 RX ADMIN — DOCUSATE SODIUM 100 MG: 100 CAPSULE ORAL at 21:44

## 2021-08-02 RX ADMIN — METOPROLOL TARTRATE 75 MG: 25 TABLET, FILM COATED ORAL at 21:43

## 2021-08-02 RX ADMIN — CITALOPRAM 10 MG: 20 TABLET, FILM COATED ORAL at 09:19

## 2021-08-02 RX ADMIN — HYDROCODONE BITARTRATE AND ACETAMINOPHEN 1 TABLET: 5; 325 TABLET ORAL at 07:16

## 2021-08-02 RX ADMIN — RAMIPRIL 20 MG: 10 CAPSULE ORAL at 09:18

## 2021-08-02 RX ADMIN — CYCLOBENZAPRINE 10 MG: 10 TABLET, FILM COATED ORAL at 21:44

## 2021-08-02 RX ADMIN — APIXABAN 5 MG: 5 TABLET, FILM COATED ORAL at 09:18

## 2021-08-02 RX ADMIN — SODIUM CHLORIDE, PRESERVATIVE FREE 10 ML: 5 INJECTION INTRAVENOUS at 09:19

## 2021-08-02 RX ADMIN — GABAPENTIN 100 MG: 100 CAPSULE ORAL at 16:10

## 2021-08-02 RX ADMIN — METOPROLOL TARTRATE 75 MG: 25 TABLET, FILM COATED ORAL at 09:18

## 2021-08-02 RX ADMIN — BUSPIRONE HYDROCHLORIDE 5 MG: 5 TABLET ORAL at 09:18

## 2021-08-02 RX ADMIN — HYDROCODONE BITARTRATE AND ACETAMINOPHEN 1 TABLET: 5; 325 TABLET ORAL at 01:21

## 2021-08-02 RX ADMIN — PANTOPRAZOLE SODIUM 40 MG: 40 TABLET, DELAYED RELEASE ORAL at 05:18

## 2021-08-02 RX ADMIN — DOCUSATE SODIUM 100 MG: 100 CAPSULE ORAL at 09:17

## 2021-08-02 RX ADMIN — HYDROCODONE BITARTRATE AND ACETAMINOPHEN 1 TABLET: 5; 325 TABLET ORAL at 21:35

## 2021-08-02 RX ADMIN — ATORVASTATIN CALCIUM 40 MG: 40 TABLET, FILM COATED ORAL at 21:42

## 2021-08-02 RX ADMIN — BUSPIRONE HYDROCHLORIDE 5 MG: 5 TABLET ORAL at 21:42

## 2021-08-02 RX ADMIN — AMIODARONE HYDROCHLORIDE 200 MG: 200 TABLET ORAL at 09:18

## 2021-08-02 RX ADMIN — GABAPENTIN 100 MG: 100 CAPSULE ORAL at 21:43

## 2021-08-02 RX ADMIN — GABAPENTIN 100 MG: 100 CAPSULE ORAL at 09:19

## 2021-08-02 RX ADMIN — SENNOSIDES 8.6 MG: 8.6 TABLET, COATED ORAL at 21:45

## 2021-08-02 ASSESSMENT — PAIN SCALES - GENERAL
PAINLEVEL_OUTOF10: 7
PAINLEVEL_OUTOF10: 0
PAINLEVEL_OUTOF10: 5
PAINLEVEL_OUTOF10: 10
PAINLEVEL_OUTOF10: 0
PAINLEVEL_OUTOF10: 0
PAINLEVEL_OUTOF10: 2
PAINLEVEL_OUTOF10: 0
PAINLEVEL_OUTOF10: 10

## 2021-08-02 ASSESSMENT — PAIN DESCRIPTION - PROGRESSION
CLINICAL_PROGRESSION: NOT CHANGED
CLINICAL_PROGRESSION: NOT CHANGED
CLINICAL_PROGRESSION: GRADUALLY WORSENING

## 2021-08-02 ASSESSMENT — PAIN DESCRIPTION - ORIENTATION: ORIENTATION: RIGHT

## 2021-08-02 ASSESSMENT — PAIN DESCRIPTION - PAIN TYPE
TYPE: ACUTE PAIN
TYPE: ACUTE PAIN

## 2021-08-02 ASSESSMENT — PAIN - FUNCTIONAL ASSESSMENT: PAIN_FUNCTIONAL_ASSESSMENT: PREVENTS OR INTERFERES SOME ACTIVE ACTIVITIES AND ADLS

## 2021-08-02 ASSESSMENT — PAIN DESCRIPTION - LOCATION: LOCATION: LEG

## 2021-08-02 ASSESSMENT — PAIN DESCRIPTION - ONSET: ONSET: ON-GOING

## 2021-08-02 ASSESSMENT — PAIN DESCRIPTION - DESCRIPTORS: DESCRIPTORS: BURNING;SHOOTING

## 2021-08-02 ASSESSMENT — PAIN DESCRIPTION - FREQUENCY: FREQUENCY: CONTINUOUS

## 2021-08-02 NOTE — CARE COORDINATION
8/2/21, 2:44 PM EDT    DISCHARGE ON [address] day: 7  Location: [facility]05/005-A Reason for admit: UTI (urinary tract infection) [N39.0]  Weakness [R53.1]   Procedure:  Has PICC line  Barriers to Discharge: Admitted per hospitalist with UTI. ID consult. IV antibiotics. Neurosurgery consult for back pain. PT/OT. N/V checks. Pain management. Daily weights. Strict I&O.   PCP: Diann Garcia MD  Readmission Risk Score: 15%  Patient Goals/Plan/Treatment Preferences:  Waiting on [facility] approval.   Payam Claire CNP,  said 6 weeks IV Rocephin started 6/30/21. Has PICC line.

## 2021-08-02 NOTE — PROGRESS NOTES
[facility]  Occupational Therapy  Daily Note  Time:   Time In: 1342  Time Out: 1356  Timed Code Treatment Minutes: 14 Minutes  Minutes: 14          Date: 2021  Patient Name: Baldo Riley,   Gender: female      Room: [facility]005-A  MRN: [id number]  : 1940  ([de-identified] y.o.)  Referring Practitioner: Dr. Booker Rojo  Diagnosis: UTI  Additional Pertinent Hx: [de-identified] y.o. female who presents to the emergency department for evaluation of bilateral leg weakness. The patient states after her morning routine yesterday morning she noticed increased weakness when walking and is now unable to walk with her walker. She underwent low back surgery a month ago and states that since the surgery she has been having trouble walking and has had to ambulate using a walker. She also reports new paresthesias bilaterally in both lower extremities. She denies chest pain, shortness of breath, diaphoresis, diarrhea, or constipation. \"    Restrictions/Precautions:  Restrictions/Precautions: General Precautions, Fall Risk  Position Activity Restriction  Spinal Precautions: No Bending, No Lifting, No Twisting  Other position/activity restrictions: hx lumbar laminectomy L2-L3 and osteomyelitis tx with abx     SUBJECTIVE: Patient supine in bed with HOB slightly elevated. Patient required encouragement and education regarding importance of participation with therapy in order to increase independence with LB dressing however patient declines all OOB activity however agreeable to BUE strengthening in bed. PAIN: 7/10:     Vitals: Vitals not assessed per clinical judgement, see nursing flowsheet    COGNITION: Decreased Insight and Decreased Problem Solving    ADL:   No ADL's completed this session. Jerel Van BALANCE:  Sitting Balance:  Modified Independent. supine in bed with HOB elevated    BED MOBILITY:  Scooting: Dependent hercules    ASSESSMENT:     Activity Tolerance:  Patient tolerance of  treatment: fair. Discharge Recommendations: [address], Patient would benefit from continued therapy after discharge   Equipment Recommendations: Other: continue to assess  Plan: Times per week: 5x  Current Treatment Recommendations: Strengthening, Patient/Caregiver Education & Training, Balance Training, Endurance Training, Safety Education & Training, Self-Care / ADL    Patient Education  Patient Education: Role of OT, Plan of Care, ADL's, Home Exercise Program, Precautions, Home Safety and Importance of Increasing Activity    Goals  Short term goals  Time Frame for Short term goals: by discharge  Short term goal 1: Pt to complete LB dressign tasks using Adam Gallop as needed with min A  Short term goal 2: Pt to increase standing tolerance to be able to complete grooming tasks at sink with CGA and no LOB during  Short term goal 3: Pt to complete toileting and transfer iwth CGA and no cues for safety    Following session, patient left in safe position with all fall risk precautions in place.

## 2021-08-02 NOTE — PROGRESS NOTES
feeling ready and the need to change her PICC line dressing everyday. [facility] never had a chance to initiate d/t being home less than 24 hours. Restrictions/Precautions:  Restrictions/Precautions: General Precautions, Fall Risk  Position Activity Restriction  Spinal Precautions: No Bending, No Lifting, No Twisting  Other position/activity restrictions: hx lumbar laminectomy L2-L3 and osteomyelitis tx with abx     SUBJECTIVE: RN approved session. Pt resting in bed upon arrival, pleasant and agreeable to therapy. PAIN: 2/10: ANUP LE. Pt had pain meds a short time before therapy arrived    Vitals: Vitals not assessed per clinical judgement, see nursing flowsheet    OBJECTIVE:  Bed Mobility:  Rolling to Right: Stand By Assistance   Supine to Sit: Stand By Assistance    Transfers:  Sit to Stand: [employer], From EOB and from bedside chair. Stand to Sit:Contact Guard Assistance    Ambulation:  Contact Guard Assistance  Distance: 50 feet x1 and 30 feet x1   Surface: Level Tile  Device:Rolling Walker  Gait Deviations: Forward Flexed Posture, Slow Daxa, Decreased Step Length Bilaterally and Decreased Gait Speed    Balance:  Dynamic Sitting Balance: Stand By Assistance    Exercise:  Patient was guided in 1 set(s) 15 reps of exercise to both lower extremities. Ankle pumps, Glut sets, Quad sets, Heelslides, Hip abduction/adduction, Straight leg raises, Seated marches, Seated hamstring curls, Seated heel/toe raises, Long arc quads and Seated abduction/adduction. Exercises were completed for increased independence with functional mobility. Functional Outcome Measures: Completed  -PAC Inpatient Mobility Raw Score : 16  -PAC Inpatient T-Scale Score : 40.78    ASSESSMENT:  Assessment: Patient progressing toward established goals. and Pt tolerated session well. Limited by decreased strength, decreased endurance, decreased mobility.  Would benefit from continued therapy at discharge. Activity Tolerance:  Patient tolerance of  treatment: good. Equipment Recommendations:Equipment Needed: No  Discharge Recommendations:  Subacute/Skilled Nursing Facility    Plan: Times per week: 6 x O  Times per day: Daily  Current Treatment Recommendations: Strengthening, Neuromuscular Re-education, Home Exercise Program, Safety Education & Training, Balance Training, Endurance Training, Patient/Caregiver Education & Training, Functional Mobility Training, Transfer Training, Gait Training, Stair training    Patient Education  Patient Education: Plan of Care, [employer] Mobility, Transfers, Gait    Goals:  Patient goals : go somewhere for therapy, find out what is causing leg weakness  Short term goals  Time Frame for Short term goals: by hospital discharge  Short term goal 1: Supine to/from sit, using log roll, with modified independence for ease of transfers at discharge site. Short term goal 2: Sit to/from stand with modified independence for ease of transfers at discharge site. Short term goal 3: Improve 5x STS time to less than 14.8 seconds to reflect age appropriate norms, indicating improved functional strength and endurance. Short term goal 4: Ambulate 48' with RW and modified independence for home distance ambulation. Short term goal 5: Ascend/descend 4 steps with BHR with modified independence for entry into home. Long term goals  Time Frame for Long term goals : N/A due to short ELOS. Following session, patient left in safe position with all fall risk precautions in place.

## 2021-08-02 NOTE — PROGRESS NOTES
Hospitalist Progress Note    Patient:  Melyssa Lobo      Unit/Bed:7K-05/005-A    YOB: 1940    MRN: [id number]       Acct: [de-identified]     PCP: Ana Gallardo MD    Date of Admission: 7/24/2021    Assessment/Plan:    1. Physical deconditioning/debility--likely secondary to age and recent surgery; PT/OT, social work is assisting with placement  2. Hypokalemia--resolved with replacement  3. Osteomyelitis L2-L3 status post laminectomy--appreciate neurosurgical input; on Rocephin~infectious disease will not see as pt follows with Dr Portillo Patches; appears patient to be on Rocephin for total of 6 weeks~per review of old records from [facility] the patient appears to have been started on Rocephin on 6/30 for total of 6 weeks and appears to end on August 11  4. Diabetes mellitus type 2, uncontrolled--on Lantus along with low-dose sliding scale, monitor  5. Essential hypertension, uncontrolled--on Norvasc, Lopressor, Lasix Imdur, Altace, monitor  6. Chronic diastolic heart failure--on Lopressor, Lasix  7. VHD with moderate tricuspid regurgitation/mild aortic stenosis  8. Atrial fibrillation--on Eliquis, amiodarone  9. Normocytic anemia  10. Moderate malnutrition--per dietitian    Expected discharge date: Pending placement     Disposition:    [] Home       [] TCU       [] Rehab       [] Psych       [x] SNF       [] [facility]       [] Other-    Chief Complaint: Inability to care for herself    Hospital Course: Per progress note dated 7/27: Geneva Garcia a [de-identified] y.o. female who presents to the emergency department for evaluation of bilateral leg weakness.  The patient states after her morning routine yesterday morning she noticed increased weakness when walking and is now unable to walk with her walker.  She underwent low back surgery a month ago and states that since the surgery she has been having trouble walking and has had to ambulate using a walker.  She also reports new paresthesias bilaterally in both lower extremities.  She denies chest pain, shortness of breath, diaphoresis, diarrhea, or constipation. The patient has no other acute complaints at this time. MRI lumbar spine 2 weeks agp showed discitis and   osteomyelitis at L2-L3, laminectomy L2-3 with possible abscess posteriorly external to the   spinal canal, no insignificant interval changes noted overall. Dr Maria Antonia Griggs and DR Merary Noonan   consulted who both agree patient does not need any surgical intervention at this time. Patient was to discharge previousy on 6 wks of antiobiotic however this was not done at   [facility]. Patient has PICC. Patient is feeling much improved, labs stable, afebrile. Patient   working with therapy. Dr Merary Noonan wrote for Rx for Ceftriaxone and this will be continued for   6 wks.  Patient stable for d/c to SNF,\"    7/28--> hemodynamically stable, laboratory studies are stable except hemoglobin at 9.4 and this morning her glucose was 67    7/29--> infectious disease will not see patient as the patient follows with another ID doctor at [facility] so attempting to obtain medical records; hemodynamically stable, glucose at 97 this morning    7/30--> old records from [facility] reviewed and timeframe was established for Rocephin; hemodynamically stable; awaiting plan from  regarding placement    7/31--> hemodynamically stable, sugars past 24 hours ranged from 120-271    8/1--> hemodynamically stable, sugars past 24 hours have ranged     8/2-->did not participate with OT yesterday; hemodynamically stable; awaiting ECF pre-CERT    Subjective (past 24 hours): c/o right thigh pain rates 6 out of 10; eating well and bowels moved; eating well     Medications:  Reviewed    Infusion Medications    dextrose      sodium chloride       Scheduled Medications    senna  1 tablet Oral Nightly    polyethylene glycol  17 g Oral Daily    pantoprazole  40 mg Oral QAM AC    insulin lispro  0-6 Units Subcutaneous TID     insulin lispro  0-3 Units Subcutaneous Nightly    busPIRone  5 mg Oral BID    citalopram  10 mg Oral Daily    docusate sodium  100 mg Oral BID    gabapentin  100 mg Oral TID    amiodarone  200 mg Oral Daily    amLODIPine  5 mg Oral Daily    apixaban  5 mg Oral BID    atorvastatin  40 mg Oral Nightly    cyclobenzaprine  10 mg Oral BID    isosorbide mononitrate  30 mg Oral Daily    [Held by provider] labetalol  100 mg Oral BID    insulin glargine  30 Units Subcutaneous Nightly    magnesium oxide  400 mg Oral Daily    metoprolol tartrate  75 mg Oral BID    potassium chloride  10 mEq Oral Q MWF    ramipril  20 mg Oral Daily    sodium chloride flush  10 mL Intravenous 2 times per day    cefTRIAXone (ROCEPHIN) IV  1,000 mg Intravenous Q24H    furosemide  20 mg Oral Daily     PRN Meds: glucose, dextrose, glucagon (rDNA), dextrose, sodium chloride flush, sodium chloride, ondansetron **OR** ondansetron, polyethylene glycol, acetaminophen **OR** acetaminophen, potassium chloride **OR** potassium alternative oral replacement **OR** potassium chloride, magnesium sulfate, HYDROcodone 5 mg - acetaminophen      Intake/Output Summary (Last 24 hours) at 8/2/2021 0551  Last data filed at 8/2/2021 0523  Gross per 24 hour   Intake 1280 ml   Output 800 ml   Net 480 ml       Diet:  ADULT DIET; Regular; 4 carb choices (60 gm/meal); Low Sodium (2 gm)  Adult Oral Nutrition Supplement; Diabetic Oral Supplement  Adult Oral Nutrition Supplement; Other Oral Supplement; activa tid with meals    Exam:  BP (!) 147/76   Pulse 67   Temp 97.4 °F (36.3 °C) (Oral)   Resp 18   Ht 5' 4\" (1.626 m)   Wt 175 lb 14.8 oz (79.8 kg)   SpO2 97%   BMI 30.20 kg/m²     General appearance: No apparent distress, appears stated age and cooperative. HEENT: Pupils equal, round, and reactive to light. Conjunctivae/corneas clear. Neck: Supple, with full range of motion. No jugular venous distention.  Trachea midline. Respiratory:  Normal respiratory effort. Clear to auscultation, bilaterally without Rales/Wheezes/Rhonchi. Cardiovascular: Regular rate and rhythm with normal S1/S2; (+) murmur noted. Abdomen: Soft, non-tender, non-distended with normal bowel sounds. Musculoskeletal: passive and active ROM x 4 extremities. Skin: Skin color, texture, turgor normal.    Neurologic:  Neurovascularly intact without any focal sensory/motor deficits. Cranial nerves: II-XII intact, grossly non-focal.  Psychiatric: Alert and oriented, thought content appropriate  Capillary Refill: Brisk,< 3 seconds   Peripheral Pulses: +2 palpable, equal bilaterally       Labs:   Recent Labs     07/30/21  0600   WBC 4.8   HGB 9.0*   HCT 30.3*        Recent Labs     07/30/21  0600      K 3.8      CO2 31   BUN 11   CREATININE 0.5   CALCIUM 9.2     Microbiology:    None    Radiology:  MRI LUMBAR SPINE W WO CONTRAST    Result Date: 7/25/2021  PROCEDURE: MRI LUMBAR SPINE W WO CONTRAST CLINICAL INFORMATION: Lumbar diskitis; LE weakness. COMPARISON: Report of previous  MRI scan dated 6/29/2021. . TECHNIQUE: Sagittal and axial T1 and T2-weighted images were obtained to the lumbar spine. Postcontrast axial and sagittal T1-weighted images were also obtained. FINDINGS: There is abnormal signal intensity in approximately the L1 vertebral body,, superior aspect of the L2 vertebral body, T12-L1 and L1-2 disc spaces consistent with vertebral body osteomyelitis and disc space infection. There is possible previous instrumentation at L1-2. There is abnormal signal intensity enhancement the soft tissues dorsally at L1 and L2. The lumbar vertebral bodies are normally aligned. There is normal marrow signal throughout. There is no bone marrow edema. There are no compression fractures. No pars defects are noted. The discs have normal signal throughout.   The visualized aspects of the distal spinal cord are normal. The nerve roots of the cauda equina and the tip of the conus are normal. There are no gross abnormalities in the distal thoracic spine. On the axial images, at T12-L1, there is mild canal and moderate bilateral from stenosis At L1-L2, there is mild canal and moderate to severe bilateral foraminal stenosis. At L2-3, there is mild to moderate canal and moderate bilateral foraminal stenosis. At L3-4, there is moderate canal and moderate to severe bilateral foraminal stenosis At L4-5, there is mild-to-moderate canal and moderate bilateral foraminal stenosis At L5-S1, there is mild canal and moderate bilateral foraminal stenosis. There is degenerative change involving the sacroiliac joints bilaterally. There is no abnormal enhancement. There is atrophy involving the iliacus, psoas and paraspinal muscles bilaterally. There is a left hip replacement. 1. Previous instrumentation at L1-2. 2. Abnormal signal intensity within the L1 vertebral body and superior aspect of the L2 vertebral body consistent with osteomyelitis. 3. Abnormal signal intensity within the T12-L1 and L1-2 disc spaces, consistent with disc space infection. 4. Abnormal signal intensity and enhancement in the soft tissues dorsally at L1 and L2 consistent with postoperative and possible inflammatory changes. 5. There is mild-to-moderate canal and moderate bilateral from stenosis at L2-3 and L4-5. 6. There is moderate canal and moderate to severe bilateral foraminal stenosis at L3-4. 7. There is mild canal and moderate severe bilateral foraminal stenosis at L1-2. 8. There is mild canal and moderate bilateral foraminal stenosis at T12-L1 and L5-S1. 9. There is degenerative change involving the sacroiliac joints bilaterally. 10. There is atrophy involving the because, psoas and paraspinal muscles bilaterally. 11. There is a left hip replacement. **This report has been created using voice recognition software.  It may contain minor errors which are inherent in voice recognition technology. ** Final report electronically signed by DR Altaf Szymanski on 7/25/2021 1:34 PM    XR CHEST 1 VIEW    Result Date: 7/25/2021  PROCEDURE: XR CHEST 1 VIEW CLINICAL INFORMATION: picc line placement COMPARISON: No prior study. TECHNIQUE:  AP chest single view  FINDINGS: There is a right upper extremity PICC present with the tip overlying the cavoatrial junction. The heart size is normal. No focal consolidation, pleural effusion or pneumothorax is seen. Left shoulder joint arthrosis is noted. 1. Right upper extremity PICC tip projects over the cavoatrial junction. **This report has been created using voice recognition software. It may contain minor errors which are inherent in voice recognition technology. ** Final report electronically signed by Dr. Pascual Robbins on 7/25/2021 10:30 AM      DVT prophylaxis: [] Lovenox                                 [] SCDs                                 [] SQ Heparin                                 [] Encourage ambulation           [x] Already on Anticoagulation~Eliquis     Code Status: DNR-CCA    Tele:   [] yes             [x] no    Active Hospital Problems    Diagnosis Date Noted    Moderate malnutrition ([facility] [address] [E44.0] 07/28/2021     Class: Acute    Weakness [R53.1] 07/26/2021    UTI (urinary tract infection) [N39.0] 07/24/2021       Electronically signed by BRENDAN Erazo CNP on 8/2/2021 at 5:51 AM

## 2021-08-02 NOTE — DISCHARGE SUMMARY
Hospital Medicine Discharge Summary      Patient Identification:   Loretta Lowery   : 1940  MRN: [id number]   Account: [de-identified]      Patient's PCP: Ace Quintero MD    Admit Date: 2021     Discharge Date:   8/3/2021    Admitting Physician: No admitting provider for patient encounter. Discharging Nurse Practitioner: Jass Killian, APRN - CNP     Discharge Diagnoses with Assessment/Plan:  1. Physical deconditioning/debility--likely secondary to age and recent surgery; PT/OT, social work is assisting with placement  2. Hypokalemia--resolved with replacement  3. Osteomyelitis L2-L3 status post laminectomy--appreciate neurosurgical input; on Rocephin~infectious disease will not see as pt follows with Dr Kayode Avilez; appears patient to be on Rocephin for total of 6 weeks~per review of old records from [facility] the patient appears to have been started on Rocephin on  for total of 6 weeks and appears to end on   4. Diabetes mellitus type 2, uncontrolled--on Lantus along with low-dose sliding scale  5. Essential hypertension, uncontrolled--on Norvasc, Lopressor, Lasix Imdur, Altace  6. Chronic diastolic heart failure--on Lopressor, Lasix  7. VHD with moderate tricuspid regurgitation/mild aortic stenosis  8. Atrial fibrillation--on Eliquis, amiodarone  9. Normocytic anemia  10. Moderate malnutrition--per dietitian  11. Chronic right thigh pain since her surgery    The patient was seen and examined on day of discharge and this discharge summary is in conjunction with any daily progress note from day of discharge.     Hospital Course:   Loretta Lowery is a [de-identified] y.o. female admitted to [address] [place] [address] on 2021 for inability to care for herself;  Per progress note dated : Padilla Read a [de-identified] y.o. female who presents to the emergency department for evaluation of bilateral leg weakness.  The patient states after her morning routine yesterday morning she noticed increased weakness when walking and is now unable to walk with her walker. She underwent low back surgery a month ago and states that since the surgery she has been having trouble walking and has had to ambulate using a walker.  She also reports new paresthesias bilaterally in both lower extremities.  She denies chest pain, shortness of breath, diaphoresis, diarrhea, or constipation. The patient has no other acute complaints at this time. MRI lumbar spine 2 weeks agp showed discitis and   osteomyelitis at L2-L3, laminectomy L2-3 with possible abscess posteriorly external to the   spinal canal, no insignificant interval changes noted overall. Dr Sandra Murrieta and DR Jerald Chang   consulted who both agree patient does not need any surgical intervention at this time. Patient was to discharge previousy on 6 wks of antiobiotic however this was not done at   [facility]. Patient has PICC. Patient is feeling much improved, labs stable, afebrile. Patient   working with therapy. Dr Jerald Chang wrote for Rx for Ceftriaxone and this will be continued for   6 wks.  Patient stable for d/c to SNF,\"     7/28--> hemodynamically stable, laboratory studies are stable except hemoglobin at 9.4 and this morning her glucose was 67     7/29--> infectious disease will not see patient as the patient follows with another ID doctor at [facility] so attempting to obtain medical records; hemodynamically stable, glucose at 97 this morning     7/30--> old records from [facility] reviewed and timeframe was established for Rocephin; hemodynamically stable; awaiting plan from  regarding placement     7/31--> hemodynamically stable, sugars past 24 hours ranged from 120-271     8/1--> hemodynamically stable, sugars past 24 hours have ranged      8/2-->did not participate with OT yesterday; hemodynamically stable; awaiting ECF pre-CERT     0/4--> participated with PT/OT yesterday, hemodynamically stable, eating well, having bowel movements, she needs to finish out her 6 weeks of antibiotic therapy which should be on August 11 and then she needs to follow-up with her infectious disease doctor, Dr. Terry Rae; she is medically stable for discharge. Exam:     Vitals:  Vitals:    08/01/21 1517 08/01/21 2137 08/02/21 0513 08/02/21 0845   BP: 131/61 134/67 (!) 147/76 114/74   Pulse: 60 63 67 67   Resp: 18 18 18 18   Temp: 97.8 °F (36.6 °C) 97.9 °F (36.6 °C) 97.4 °F (36.3 °C) 97.4 °F (36.3 °C)   TempSrc: Oral Oral Oral Oral   SpO2: 92% 95% 97% 96%   Weight:       Height:         Weight: Weight: 175 lb 14.8 oz (79.8 kg)     24 hour intake/output:    Intake/Output Summary (Last 24 hours) at 8/2/2021 1244  Last data filed at 8/2/2021 5887  Gross per 24 hour   Intake 1040 ml   Output --   Net 1040 ml         General appearance:  No apparent distress, appears stated age and cooperative. HEENT:  Normal cephalic, atraumatic without obvious deformity. Pupils equal, round, and reactive to light. Conjunctivae/corneas clear. Neck: Supple, with full range of motion. No jugular venous distention. Trachea midline. Respiratory:  Normal respiratory effort. Clear to auscultation, bilaterally without Rales/Wheezes/Rhonchi. Cardiovascular:  Regular rate and rhythm with normal S1/S2 without murmurs, rubs or gallops. Abdomen: Soft, non-tender, non-distended with normal bowel sounds. Musculoskeletal:  No clubbing, cyanosis or edema bilaterally. Full range of motion without deformity. Skin: Skin color, texture, turgor normal.    Neurologic:  Neurovascularly intact without any focal sensory/motor deficits. Cranial nerves: II-XII intact, grossly non-focal.  Psychiatric:  Alert and oriented, thought content appropriate  Capillary Refill: Brisk,< 3 seconds   Peripheral Pulses: +2 palpable, equal bilaterally       Labs:  For convenience and continuity at follow-up the following most recent labs are provided:      CBC:    Lab Results   Component Value Date    WBC 6.3 08/02/2021    HGB 9.2 08/02/2021    HCT 31.3 08/02/2021     08/02/2021       Renal:    Lab Results   Component Value Date     08/02/2021    K 3.9 08/02/2021    K 3.7 07/25/2021     08/02/2021    CO2 28 08/02/2021    BUN 11 08/02/2021    CREATININE 0.5 08/02/2021    CALCIUM 9.3 08/02/2021       Cardiac: No results for input(s): Evart Lager in the last 72 hours. Significant Diagnostic Studies    Radiology:   XR CHEST 1 VIEW   Final Result   1. Right upper extremity PICC tip projects over the cavoatrial junction. **This report has been created using voice recognition software. It may contain minor errors which are inherent in voice recognition technology. **      Final report electronically signed by Dr. Renan Langley on 7/25/2021 10:30 AM      MRI LUMBAR SPINE W WO CONTRAST   Final Result       1. Previous instrumentation at L1-2.   2. Abnormal signal intensity within the L1 vertebral body and superior aspect of the L2 vertebral body consistent with osteomyelitis. 3. Abnormal signal intensity within the T12-L1 and L1-2 disc spaces, consistent with disc space infection. 4. Abnormal signal intensity and enhancement in the soft tissues dorsally at L1 and L2 consistent with postoperative and possible inflammatory changes. 5. There is mild-to-moderate canal and moderate bilateral from stenosis at L2-3 and L4-5.   6. There is moderate canal and moderate to severe bilateral foraminal stenosis at L3-4.   7. There is mild canal and moderate severe bilateral foraminal stenosis at L1-2.   8. There is mild canal and moderate bilateral foraminal stenosis at T12-L1 and L5-S1.   9. There is degenerative change involving the sacroiliac joints bilaterally. 10. There is atrophy involving the because, psoas and paraspinal muscles bilaterally. 11. There is a left hip replacement. **This report has been created using voice recognition software.  It may contain minor errors which are inherent in voice recognition technology. **      Final report electronically signed by DR Tamika Cowan on 7/25/2021 1:34 PM             Consults:     IP CONSULT TO INFECTIOUS DISEASES  IP CONSULT TO NEUROSURGERY  IP CONSULT TO SOCIAL WORK    Disposition:    [] Home       [] TCU       [] Rehab       [] Psych       [x] SNF       [] [facility]       [] Other-    Condition at Discharge: Stable    Code Status:  DNR-CCA     Pending tests at discharge: none    Patient Instructions:    Discharge lab work: none  Activity: activity as tolerated  Diet: ADULT DIET; Regular; 4 carb choices (60 gm/meal); Low Sodium (2 gm)  Adult Oral Nutrition Supplement; Diabetic Oral Supplement  Adult Oral Nutrition Supplement; Other Oral Supplement; activa tid with meals      Follow-up visits:   No follow-up provider specified.        Discharge Medications:      Rosita Cranker   [place] Medication Instructions [facility]:[id number]    Printed on:08/02/21 1244   Medication Information                      amiodarone (CORDARONE) 200 MG tablet  Take 1 tablet by mouth daily             amLODIPine (NORVASC) 5 MG tablet  Take 1 tablet by mouth daily             apixaban (ELIQUIS) 5 MG TABS tablet  Take 1 tablet by mouth 2 times daily             atorvastatin (LIPITOR) 40 MG tablet  Take 1 tablet by mouth daily             cyclobenzaprine (FLEXERIL) 10 MG tablet  cyclobenzaprine Cyclobenzaprine Active 10 MG PO Twice Daily as needed 12 April 8th, 2021 8:40pm 04-  Amberly 1153 ([id number])             furosemide (LASIX) 40 MG tablet  Take 40 mg by mouth See Admin Instructions Qd on M,W,F             Insulin Pen Needle 31G X 8 MM MISC  1 each by Does not apply route daily             isosorbide mononitrate (IMDUR) 30 MG extended release tablet  Take 30 mg by mouth daily             labetalol (NORMODYNE) 100 MG tablet  Take 100 mg by mouth 2 times daily             LANTUS SOLOSTAR 100 UNIT/ML injection pen  Inject 30 Units into the skin daily             magnesium oxide (MAG-OX) 400 MG tablet  Take 400 mg by mouth daily             metFORMIN (GLUCOPHAGE) 1000 MG tablet  Take 1 tablet by mouth 2 times daily             metoprolol tartrate (LOPRESSOR) 25 MG tablet  Take 3 tablets by mouth 2 times daily             Multiple Vitamins-Minerals (OCUVITE PRESERVISION PO)  Take by mouth daily             Multiple Vitamins-Minerals (THERAPEUTIC MULTIVITAMIN-MINERALS) tablet  Take 1 tablet by mouth daily. Omega-3 Fatty Acids (FISH OIL) 1000 MG CAPS  Take 3,000 mg by mouth 2 times daily             potassium chloride (MICRO-K) 10 MEQ extended release capsule  Take 10 mEq by mouth See Admin Instructions Qd on M,W,F             ramipril (ALTACE) 10 MG capsule  Take 20 mg by mouth daily                  Time Spent on discharge is more than 45 minutes in the examination, evaluation, counseling and review of medications and discharge plan. Signed: Thank you Rose Gaffney MD for the opportunity to be involved in this patient's care. Electronically signed by BRENDAN Yadav CNP on 8/3/2021 at 1108.

## 2021-08-03 LAB
GLUCOSE BLD-MCNC: 146 MG/DL (ref 70–108)
GLUCOSE BLD-MCNC: 162 MG/DL (ref 70–108)
GLUCOSE BLD-MCNC: 182 MG/DL (ref 70–108)
GLUCOSE BLD-MCNC: 96 MG/DL (ref 70–108)

## 2021-08-03 PROCEDURE — 82948 REAGENT STRIP/BLOOD GLUCOSE: CPT

## 2021-08-03 PROCEDURE — 6370000000 HC RX 637 (ALT 250 FOR IP): Performed by: INTERNAL MEDICINE

## 2021-08-03 PROCEDURE — 2580000003 HC RX 258: Performed by: INTERNAL MEDICINE

## 2021-08-03 PROCEDURE — 97116 GAIT TRAINING THERAPY: CPT

## 2021-08-03 PROCEDURE — 97110 THERAPEUTIC EXERCISES: CPT

## 2021-08-03 PROCEDURE — 1200000000 HC SEMI PRIVATE

## 2021-08-03 PROCEDURE — 6370000000 HC RX 637 (ALT 250 FOR IP): Performed by: NURSE PRACTITIONER

## 2021-08-03 PROCEDURE — 6360000002 HC RX W HCPCS: Performed by: INTERNAL MEDICINE

## 2021-08-03 PROCEDURE — 99239 HOSP IP/OBS DSCHRG MGMT >30: CPT | Performed by: NURSE PRACTITIONER

## 2021-08-03 PROCEDURE — 97530 THERAPEUTIC ACTIVITIES: CPT

## 2021-08-03 PROCEDURE — 97535 SELF CARE MNGMENT TRAINING: CPT

## 2021-08-03 RX ADMIN — CYCLOBENZAPRINE 10 MG: 10 TABLET, FILM COATED ORAL at 08:47

## 2021-08-03 RX ADMIN — METOPROLOL TARTRATE 75 MG: 25 TABLET, FILM COATED ORAL at 20:58

## 2021-08-03 RX ADMIN — GABAPENTIN 100 MG: 100 CAPSULE ORAL at 20:58

## 2021-08-03 RX ADMIN — CEFTRIAXONE SODIUM 1000 MG: 1 INJECTION, POWDER, FOR SOLUTION INTRAMUSCULAR; INTRAVENOUS at 20:52

## 2021-08-03 RX ADMIN — Medication 400 MG: at 08:47

## 2021-08-03 RX ADMIN — SENNOSIDES 8.6 MG: 8.6 TABLET, COATED ORAL at 20:58

## 2021-08-03 RX ADMIN — GABAPENTIN 100 MG: 100 CAPSULE ORAL at 08:47

## 2021-08-03 RX ADMIN — SODIUM CHLORIDE, PRESERVATIVE FREE 10 ML: 5 INJECTION INTRAVENOUS at 08:48

## 2021-08-03 RX ADMIN — DOCUSATE SODIUM 100 MG: 100 CAPSULE ORAL at 20:58

## 2021-08-03 RX ADMIN — INSULIN GLARGINE 30 UNITS: 100 INJECTION, SOLUTION SUBCUTANEOUS at 21:00

## 2021-08-03 RX ADMIN — AMLODIPINE BESYLATE 5 MG: 5 TABLET ORAL at 08:48

## 2021-08-03 RX ADMIN — FUROSEMIDE 20 MG: 40 TABLET ORAL at 08:48

## 2021-08-03 RX ADMIN — ISOSORBIDE MONONITRATE 30 MG: 30 TABLET ORAL at 08:47

## 2021-08-03 RX ADMIN — AMIODARONE HYDROCHLORIDE 200 MG: 200 TABLET ORAL at 08:48

## 2021-08-03 RX ADMIN — SODIUM CHLORIDE, PRESERVATIVE FREE 10 ML: 5 INJECTION INTRAVENOUS at 21:24

## 2021-08-03 RX ADMIN — APIXABAN 5 MG: 5 TABLET, FILM COATED ORAL at 08:48

## 2021-08-03 RX ADMIN — HYDROCODONE BITARTRATE AND ACETAMINOPHEN 1 TABLET: 5; 325 TABLET ORAL at 08:54

## 2021-08-03 RX ADMIN — GABAPENTIN 100 MG: 100 CAPSULE ORAL at 13:57

## 2021-08-03 RX ADMIN — CYCLOBENZAPRINE 10 MG: 10 TABLET, FILM COATED ORAL at 20:58

## 2021-08-03 RX ADMIN — APIXABAN 5 MG: 5 TABLET, FILM COATED ORAL at 20:58

## 2021-08-03 RX ADMIN — METOPROLOL TARTRATE 75 MG: 25 TABLET, FILM COATED ORAL at 08:48

## 2021-08-03 RX ADMIN — ATORVASTATIN CALCIUM 40 MG: 40 TABLET, FILM COATED ORAL at 20:59

## 2021-08-03 RX ADMIN — BUSPIRONE HYDROCHLORIDE 5 MG: 5 TABLET ORAL at 20:59

## 2021-08-03 RX ADMIN — BUSPIRONE HYDROCHLORIDE 5 MG: 5 TABLET ORAL at 08:48

## 2021-08-03 RX ADMIN — HYDROCODONE BITARTRATE AND ACETAMINOPHEN 1 TABLET: 5; 325 TABLET ORAL at 20:33

## 2021-08-03 RX ADMIN — INSULIN LISPRO 1 UNITS: 100 INJECTION, SOLUTION INTRAVENOUS; SUBCUTANEOUS at 16:54

## 2021-08-03 RX ADMIN — CITALOPRAM 10 MG: 20 TABLET, FILM COATED ORAL at 08:48

## 2021-08-03 RX ADMIN — RAMIPRIL 20 MG: 10 CAPSULE ORAL at 08:47

## 2021-08-03 RX ADMIN — PANTOPRAZOLE SODIUM 40 MG: 40 TABLET, DELAYED RELEASE ORAL at 05:57

## 2021-08-03 RX ADMIN — HYDROCODONE BITARTRATE AND ACETAMINOPHEN 1 TABLET: 5; 325 TABLET ORAL at 03:48

## 2021-08-03 ASSESSMENT — PAIN SCALES - GENERAL
PAINLEVEL_OUTOF10: 3
PAINLEVEL_OUTOF10: 8
PAINLEVEL_OUTOF10: 6
PAINLEVEL_OUTOF10: 4
PAINLEVEL_OUTOF10: 5
PAINLEVEL_OUTOF10: 0
PAINLEVEL_OUTOF10: 3

## 2021-08-03 NOTE — PROGRESS NOTES
Hospitalist Progress Note    Patient:  January Kumar      Unit/Bed:7K-05/005-A    YOB: 1940    MRN: [id number]       Acct: [de-identified]     PCP: Laxmi Reardon MD    Date of Admission: 7/24/2021    Assessment/Plan:    1. Physical deconditioning/debility--likely secondary to age and recent surgery; PT/OT, social work is assisting with placement  2. Hypokalemia--resolved with replacement  3. Osteomyelitis L2-L3 status post laminectomy--appreciate neurosurgical input; on Rocephin~infectious disease will not see as pt follows with Dr Leeanne Clarke; appears patient to be on Rocephin for total of 6 weeks~per review of old records from [facility] the patient appears to have been started on Rocephin on 6/30 for total of 6 weeks and appears to end on August 11  4. Diabetes mellitus type 2, uncontrolled--on Lantus along with low-dose sliding scale, monitor  5. Essential hypertension, uncontrolled--on Norvasc, Lopressor, Lasix Imdur, Altace, monitor  6. Chronic diastolic heart failure--on Lopressor, Lasix  7. VHD with moderate tricuspid regurgitation/mild aortic stenosis  8. Atrial fibrillation--on Eliquis, amiodarone  9. Normocytic anemia  10. Moderate malnutrition--per dietitian    Expected discharge date: Pending placement/pre cert     Disposition:    [] Home       [] TCU       [] Rehab       [] Psych       [x] SNF       [] [place]       [] Other-    Chief Complaint: Inability to care for herself    Hospital Course: Per progress note dated 7/27: Elaine Muro a [de-identified] y.o. female who presents to the emergency department for evaluation of bilateral leg weakness.  The patient states after her morning routine yesterday morning she noticed increased weakness when walking and is now unable to walk with her walker.  She underwent low back surgery a month ago and states that since the surgery she has been having trouble walking and has had to ambulate using a walker.  She also reports new See previous TE about MRI. If ordered with and without contrast it will be approved. Please continue with order for with and without contrast lumbar spine MRI. I called [phone] spoke with chao.  Looks like she will need an new authorization because she need paresthesias bilaterally in both lower extremities.  She denies chest pain, shortness of breath, diaphoresis, diarrhea, or constipation. The patient has no other acute complaints at this time. MRI lumbar spine 2 weeks agp showed discitis and   osteomyelitis at L2-L3, laminectomy L2-3 with possible abscess posteriorly external to the   spinal canal, no insignificant interval changes noted overall. Dr Khadijah Whitman and DR Fermin Davis   consulted who both agree patient does not need any surgical intervention at this time. Patient was to discharge previousy on 6 wks of antiobiotic however this was not done at   [facility]. Patient has PICC. Patient is feeling much improved, labs stable, afebrile. Patient   working with therapy. Dr Fermin Davis wrote for Rx for Ceftriaxone and this will be continued for   6 wks.  Patient stable for d/c to SNF,\"    7/28--> hemodynamically stable, laboratory studies are stable except hemoglobin at 9.4 and this morning her glucose was 67    7/29--> infectious disease will not see patient as the patient follows with another ID doctor at [facility] so attempting to obtain medical records; hemodynamically stable, glucose at 97 this morning    7/30--> old records from [facility] reviewed and timeframe was established for Rocephin; hemodynamically stable; awaiting plan from  regarding placement    7/31--> hemodynamically stable, sugars past 24 hours ranged from 120-271    8/1--> hemodynamically stable, sugars past 24 hours have ranged     8/2-->did not participate with OT yesterday; hemodynamically stable; awaiting ECF pre-CERT    2/4--> participated with PT and OT yesterday, hemodynamically stable, sugars stable    Subjective (past 24 hours): Denies right thigh pain; eating well and bowels moved; eating well     Medications:  Reviewed    Infusion Medications    dextrose      sodium chloride       Scheduled Medications    senna  1 tablet Oral Nightly    polyethylene glycol  17 g Oral Daily    pantoprazole  40 mg Oral QAM AC    insulin lispro  0-6 Units Subcutaneous TID WC    insulin lispro  0-3 Units Subcutaneous Nightly    busPIRone  5 mg Oral BID    citalopram  10 mg Oral Daily    docusate sodium  100 mg Oral BID    gabapentin  100 mg Oral TID    amiodarone  200 mg Oral Daily    amLODIPine  5 mg Oral Daily    apixaban  5 mg Oral BID    atorvastatin  40 mg Oral Nightly    cyclobenzaprine  10 mg Oral BID    isosorbide mononitrate  30 mg Oral Daily    [Held by provider] labetalol  100 mg Oral BID    insulin glargine  30 Units Subcutaneous Nightly    magnesium oxide  400 mg Oral Daily    metoprolol tartrate  75 mg Oral BID    potassium chloride  10 mEq Oral Q MWF    ramipril  20 mg Oral Daily    sodium chloride flush  10 mL Intravenous 2 times per day    cefTRIAXone (ROCEPHIN) IV  1,000 mg Intravenous Q24H    furosemide  20 mg Oral Daily     PRN Meds: glucose, dextrose, glucagon (rDNA), dextrose, sodium chloride flush, sodium chloride, ondansetron **OR** ondansetron, polyethylene glycol, acetaminophen **OR** acetaminophen, potassium chloride **OR** potassium alternative oral replacement **OR** potassium chloride, magnesium sulfate, HYDROcodone 5 mg - acetaminophen      Intake/Output Summary (Last 24 hours) at 8/3/2021 4131  Last data filed at 8/2/2021 2120  Gross per 24 hour   Intake 549.83 ml   Output --   Net 549.83 ml       Diet:  ADULT DIET; Regular; 4 carb choices (60 gm/meal); Low Sodium (2 gm)  Adult Oral Nutrition Supplement; Diabetic Oral Supplement  Adult Oral Nutrition Supplement; Other Oral Supplement; activa tid with meals    Exam:  /62   Pulse 59   Temp 97.8 °F (36.6 °C) (Oral)   Resp 18   Ht 5' 4\" (1.626 m)   Wt 175 lb 14.8 oz (79.8 kg)   SpO2 98%   BMI 30.20 kg/m²     General appearance: No apparent distress, appears stated age and cooperative. HEENT: Pupils equal, round, and reactive to light. Conjunctivae/corneas clear.   Neck: Supple, with distal spinal cord are normal. The nerve roots of the cauda equina and the tip of the conus are normal. There are no gross abnormalities in the distal thoracic spine. On the axial images, at T12-L1, there is mild canal and moderate bilateral from stenosis At L1-L2, there is mild canal and moderate to severe bilateral foraminal stenosis. At L2-3, there is mild to moderate canal and moderate bilateral foraminal stenosis. At L3-4, there is moderate canal and moderate to severe bilateral foraminal stenosis At L4-5, there is mild-to-moderate canal and moderate bilateral foraminal stenosis At L5-S1, there is mild canal and moderate bilateral foraminal stenosis. There is degenerative change involving the sacroiliac joints bilaterally. There is no abnormal enhancement. There is atrophy involving the iliacus, psoas and paraspinal muscles bilaterally. There is a left hip replacement. 1. Previous instrumentation at L1-2. 2. Abnormal signal intensity within the L1 vertebral body and superior aspect of the L2 vertebral body consistent with osteomyelitis. 3. Abnormal signal intensity within the T12-L1 and L1-2 disc spaces, consistent with disc space infection. 4. Abnormal signal intensity and enhancement in the soft tissues dorsally at L1 and L2 consistent with postoperative and possible inflammatory changes. 5. There is mild-to-moderate canal and moderate bilateral from stenosis at L2-3 and L4-5. 6. There is moderate canal and moderate to severe bilateral foraminal stenosis at L3-4. 7. There is mild canal and moderate severe bilateral foraminal stenosis at L1-2. 8. There is mild canal and moderate bilateral foraminal stenosis at T12-L1 and L5-S1. 9. There is degenerative change involving the sacroiliac joints bilaterally. 10. There is atrophy involving the because, psoas and paraspinal muscles bilaterally. 11. There is a left hip replacement. **This report has been created using voice recognition software.  It may contain minor errors which are inherent in voice recognition technology. ** Final report electronically signed by DR Chasity Burdick on 7/25/2021 1:34 PM    XR CHEST 1 VIEW    Result Date: 7/25/2021  PROCEDURE: XR CHEST 1 VIEW CLINICAL INFORMATION: picc line placement COMPARISON: No prior study. TECHNIQUE:  AP chest single view  FINDINGS: There is a right upper extremity PICC present with the tip overlying the cavoatrial junction. The heart size is normal. No focal consolidation, pleural effusion or pneumothorax is seen. Left shoulder joint arthrosis is noted. 1. Right upper extremity PICC tip projects over the cavoatrial junction. **This report has been created using voice recognition software. It may contain minor errors which are inherent in voice recognition technology. ** Final report electronically signed by Dr. Doni Ross on 7/25/2021 10:30 AM      DVT prophylaxis: [] Lovenox                                 [] SCDs                                 [] SQ Heparin                                 [] Encourage ambulation           [x] Already on Anticoagulation~Eliquis     Code Status: DNR-CCA    Tele:   [] yes             [x] no    Active Hospital Problems    Diagnosis Date Noted    Moderate malnutrition ([address] [E44.0] 07/28/2021     Class: Acute    Weakness [R53.1] 07/26/2021    UTI (urinary tract infection) [N39.0] 07/24/2021       Electronically signed by BRENDAN Pollard CNP on 8/3/2021 at 6:13 AM

## 2021-08-03 NOTE — CARE COORDINATION
8/3/21, 1:46 PM EDT    DISCHARGE ON [address] day: 8  Location: -05/005-A Reason for admit: UTI (urinary tract infection) [N39.0]  Weakness [R53.1]   Procedure: Has PICC line  Barriers to Discharge: Admitted per hospitalist with UTI. ID consult. IV antibiotics. Neurosurgery consult for back pain. PT/OT. N/V checks. Pain management. Daily weights. Strict I&O.   Liz Alcantar MD  Readmission Risk Score: 15%  Patient Goals/Plan/Treatment Preferences:  [facility] precert approval noted today.    TAMMI Reagan,  said 6 weeks IV Rocephin started 6/30/21.  Has PICC line  See SW notes - patient considering different ECFs

## 2021-08-03 NOTE — CARE COORDINATION
8/3/21, 9:28 AM EDT    DISCHARGE PLANNING EVALUATION      Spoke with [facility] admissions. Precert is complete. Spoke with patient, she is now declining to go to [facility]. She is requesting [address]. Referral made to [facility], admissions at the [facility] report they are out of network. Call made to sister, as requested, unable to reach at this time. In network facilities are [facility], [facility], which patient has declined, [facility], which declined due to insurance concerns. [facility] are in network. Patient has requested [facility], [facility] [employer] and [facility], which are all out of network.

## 2021-08-03 NOTE — PROGRESS NOTES
[address] [address]  INPATIENT PHYSICAL THERAPY  DAILY NOTE  [facility] ORTHOPEDICS 7K - 7K-05/005-A    Time In: 0800  Time Out: 0838  Timed Code Treatment Minutes: 45 Minutes  Minutes: 38          Date: 8/3/2021  Patient Name: Caleb Dooley,  Gender:  female        MRN: [id number]  : 1940  ([de-identified] y.o.)     Referring Practitioner: Princess Troncoso MD  Diagnosis: UTI  Additional Pertinent Hx: Per HPI: Eda Dewey is a [de-identified] y.o. female who presents to the emergency department for evaluation of bilateral leg weakness. The patient states after her morning routine yesterday morning she noticed increased weakness when walking and is now unable to walk with her walker. She underwent low back surgery a month ago and states that since the surgery she has been having trouble walking and has had to ambulate using a walker. She also reports new paresthesias bilaterally in both lower extremities. She denies chest pain, shortness of breath, diaphoresis, diarrhea, or constipation. \"     Prior Level of Function:  Lives With: Daughter (daughter lives with pt)  Type of Home: Mobile home  Home Layout: One level  Home Access: Stairs to enter with rails (platform steps wher she can put her walker up on)  Entrance Stairs - Number of Steps: 4  Home Equipment: Rolling walker, 4 wheeled walker, Cane, [id number] [facility] Shower/Tub: Walk-in shower, Shower chair with back  Bathroom Toilet: Handicap height    ADL Assistance: Independent  Ambulation Assistance: Needs assistance (pt states on her last day at [facility] she was walking independently with walker, but that was the first time, and pt got much weaker following that)  Transfer Assistance: Independent  Active : No  Additional Comments: pt states she left the SNF [facility]) last Friday and felt she was not ready to go home yet. Pt stating when she left SNF, she was able to complete her ADL tasks indep.  Pt stating she was a \"nervous wreck\" going home d/t not feeling ready and the need to change her PICC line dressing everyday. Pt was suppose to have [place] therapy but never had a chance to initiate d/t being home less than 24 hours. Restrictions/Precautions:  Restrictions/Precautions: General Precautions, Fall Risk  Position Activity Restriction  Spinal Precautions: No Bending, No Lifting, No Twisting  Other position/activity restrictions: hx lumbar laminectomy L2-L3 and osteomyelitis tx with abx     SUBJECTIVE: Tech in with pt, pt requesting to use restroom then agreeable to therapy. PAIN: 0/10: denies     Vitals: Vitals not assessed per clinical judgement, see nursing flowsheet    OBJECTIVE:  Bed Mobility:  Not Tested    Transfers:  Sit to Stand: [employer], From EOB, toilet, and bedside chair  Stand to Sit:Contact Guard Assistance    Ambulation:  Contact Guard Assistance  Distance: 50 feet x1 and 20 feet x1   Surface: Level Tile  Device:Rolling Walker  Gait Deviations: Forward Flexed Posture, Slow Daxa, Decreased Step Length Bilaterally, Decreased Gait Speed and Decreased Terminal Knee Extension  While taking steps back to chair pt was very unsteady requiring Min A to maintain balance. Balance:  Pt up in restroom, completed arianna clean up with min A, washed hands at sink. Required one to two UE support on AD for stability. Tinetti balance assessment completed see below. Exercise:  Patient was guided in 1 set(s) 10 reps of exercise to both lower extremities. Ankle pumps, Glut sets, Quad sets, Heelslides, Hip abduction/adduction, Straight leg raises, Seated marches, Seated hamstring curls, Seated heel/toe raises, Long arc quads and Seated abduction/adduction. Exercises were completed for increased independence with functional mobility.     Functional Outcome Measures: Completed  Balance Score: 8  Gait Score: 10  Tinetti Total Score: 18  Risk Indicators:  Less than/equal to 18 = high risk  19-23 Moderate risk  Greater than/equal to 24 = low risk     AM-PAC Inpatient Mobility Raw Score : 16  AM-PAC Inpatient T-Scale Score : 40.78       ASSESSMENT:  Assessment: Patient progressing toward established goals. and Pt tolerated session fairly well. Limited by decreased strength, decreased balance, decreased endurance. Would benefit from continued therapy before returning home. Activity Tolerance:  Patient tolerance of  treatment: good. Equipment Recommendations:Equipment Needed: No  Discharge Recommendations:  Subacute/Skilled Nursing Facility    Plan: Times per week: 6 x O  Times per day: Daily  Current Treatment Recommendations: Strengthening, Neuromuscular Re-education, Home Exercise Program, Safety Education & Training, Balance Training, Endurance Training, Patient/Caregiver Education & Training, Functional Mobility Training, Transfer Training, Gait Training, Stair training    Patient Education  Patient Education: Plan of Care, Transfers, Gait    Goals:  Patient goals : go somewhere for therapy, find out what is causing leg weakness  Short term goals  Time Frame for Short term goals: by hospital discharge  Short term goal 1: Supine to/from sit, using log roll, with modified independence for ease of transfers at discharge site. Short term goal 2: Sit to/from stand with modified independence for ease of transfers at discharge site. Short term goal 3: Improve 5x STS time to less than 14.8 seconds to reflect age appropriate norms, indicating improved functional strength and endurance. Short term goal 4: Ambulate 48' with RW and modified independence for home distance ambulation. Short term goal 5: Ascend/descend 4 steps with BHR with modified independence for entry into home. Long term goals  Time Frame for Long term goals : N/A due to short ELOS. Following session, patient left in safe position with all fall risk precautions in place.

## 2021-08-03 NOTE — PROGRESS NOTES
[facility]  Occupational Therapy  Daily Note  Time:   Time In: 930  Time Out: 1153  Timed Code Treatment Minutes: 25 Minutes  Minutes: 25          Date: 8/3/2021  Patient Name: Kristie Sharma,   Gender: female      Room: [facility]005-A  MRN: [id number]  : 1940  ([de-identified] y.o.)  Referring Practitioner: Dr. Kathy Gallo. [place]  Diagnosis: UTI  Additional Pertinent Hx: [de-identified] y.o. female who presents to the emergency department for evaluation of bilateral leg weakness. The patient states after her morning routine yesterday morning she noticed increased weakness when walking and is now unable to walk with her walker. She underwent low back surgery a month ago and states that since the surgery she has been having trouble walking and has had to ambulate using a walker. She also reports new paresthesias bilaterally in both lower extremities. She denies chest pain, shortness of breath, diaphoresis, diarrhea, or constipation. \"    Restrictions/Precautions:  Restrictions/Precautions: General Precautions, Fall Risk  Position Activity Restriction  Spinal Precautions: No Bending, No Lifting, No Twisting  Other position/activity restrictions: hx lumbar laminectomy L2-L3 and osteomyelitis tx with abx     SUBJECTIVE: Patient in bed upon arrival first attempt patient declined second attempt agreeable     PAIN: no     Vitals: Vitals not assessed per clinical judgement, see nursing flowsheet    COGNITION: Decreased Insight    ADL:   Toileting: Contact Guard Assistance. Toilet Transfer: Contact Guard Assistance. Lisa Tobias BALANCE:  Sitting Balance:  Supervision. Standing Balance: Contact Guard Assistance. BED MOBILITY:  Supine to Sit: Minimal Assistance      TRANSFERS:  Sit to Stand:  Contact Guard Assistance. Stand to Sit: Contact Guard Assistance. FUNCTIONAL MOBILITY:  Assistive Device: Rolling Walker  Assist Level:  Contact Guard Assistance. Distance:  To and from bathroom     ADDITIONAL ACTIVITIES:  Guided patient through BUE AROM this date x15 reps x1 set in all planes and joints available in order to increase BUE strength and improve activity tolerance for ADLs and homemaking tasks. ASSESSMENT:     Activity Tolerance:  Patient tolerance of  treatment: good. Discharge Recommendations: [address], Patient would benefit from continued therapy after discharge   Equipment Recommendations: Other: continue to assess  Plan: Times per week: 5x  Current Treatment Recommendations: Strengthening, Patient/Caregiver Education & Training, Balance Training, Endurance Training, Safety Education & Training, Self-Care / ADL    Patient Education  Patient Education: Home Exercise Program    Goals  Short term goals  Time Frame for Short term goals: by discharge  Short term goal 1: Pt to complete LB dressign tasks using Rey Sin as needed with min A  Short term goal 2: Pt to increase standing tolerance to be able to complete grooming tasks at sink with CGA and no LOB during  Short term goal 3: Pt to complete toileting and transfer iwth CGA and no cues for safety    Following session, patient left in safe position with all fall risk precautions in place.

## 2021-08-03 NOTE — PROGRESS NOTES
Comprehensive Nutrition Assessment    Type and Reason for Visit:  Reassess    Nutrition Recommendations/Plan:   Continue current diet and ONS (Glucerna 4 cartons/ day per patient request, Activia TID)  Continue glucose checks and recommend regular A1C checks (last in system 3/11/21 8.6%)    Nutrition Assessment:    Pt. Improving from nutritional standpoint AEB improving appetite/ intake, good acceptance of ONS. At risk for further nutrition compromise r/t moderate malnutrition, admitted with bilateral leg weakness, UTI and underlying medical condition (pt reports back OR in may 2021, DM, HTN & Afib). Nutrition recommendations/interventions as per above. Malnutrition Assessment:  Malnutrition Status: Moderate malnutrition    Context:  Acute Illness     Findings of the 6 clinical characteristics of malnutrition:  Energy Intake:  1 - 75% or less of estimated energy requirements for 7 or more days  Weight Loss:   (11.3 % weight loss in 3 months)     Body Fat Loss:  No significant body fat loss     Muscle Mass Loss:  No significant muscle mass loss    Fluid Accumulation:  No significant fluid accumulation     Strength:  Not Performed    Estimated Daily Nutrient Needs:  Energy (kcal):  ~3015-8436 ( 20-22); Weight Used for Energy Requirements:  Admission     Protein (g):  82 grams ( 1.5); Weight Used for Protein Requirements:  Ideal        Fluid (ml/day):  per Dr;     Nutrition Related Findings:     Patient reports improving appetite, good acceptance of Glucerna (4 daily) and Activia TID, intake 51% or greater of meals per graphics; last BM recorded 7/30, patient reports had BM this morning; glucose 96, 146; antibiotic, lasix, humalog, lantus, colace, glycolax, senokot    Wounds:   (pt reports back OR in May & she is healed. pt reports no open skin)       Current Nutrition Therapies:    ADULT DIET; Regular; 4 carb choices (60 gm/meal);  Low Sodium (2 gm)  Adult Oral Nutrition Supplement; Diabetic Oral Supplement  Adult Oral Nutrition Supplement; Other Oral Supplement; activa tid with meals    Anthropometric Measures:  · Height: 5' 4\" (162.6 cm)  · Current Body Weight: 175 lb 14.8 oz (79.8 kg) (8/1; no edema)   · Admission Body Weight: 171 lb 1.2 oz (77.6 kg) (7/27 no edema)    · Usual Body Weight: 193 lb (87.5 kg) (per EMR 5/15/21. Pt reports UBW ~190#)     · Ideal Body Weight: 120 lbs;   · BMI: 30.2  · BMI Categories: Obese Class 1 (BMI 30.0-34. 9)       Nutrition Diagnosis:   · Moderate malnutrition related to inadequate protein-energy intake as evidenced by  (11.3% weight loss in 3 months. less than 75% projected energy needs for greater than 7 days)      Nutrition Interventions:   Food and/or Nutrient Delivery:  Continue Current Diet, Continue Oral Nutrition Supplement  Nutrition Education/Counseling:  Education initiated   Coordination of Nutrition Care:  No recommendation at this time    Goals:  75% or more CHO controlled healthy diet during LOS       Nutrition Monitoring and Evaluation:   Behavioral-Environmental Outcomes:  None Identified   Food/Nutrient Intake Outcomes:  Food and Nutrient Intake, Supplement Intake  Physical Signs/Symptoms Outcomes:  Biochemical Data, Chewing or Swallowing, Constipation, Fluid Status or Edema, Hemodynamic Status, Nutrition Focused Physical Findings, Skin, Weight     Discharge Planning:     Too soon to determine     Electronically signed by Bell Baugh RD, LD on 8/3/21 at 12:01 PM EDT    Contact: [phone]

## 2021-08-04 LAB
GLUCOSE BLD-MCNC: 129 MG/DL (ref 70–108)
GLUCOSE BLD-MCNC: 169 MG/DL (ref 70–108)
GLUCOSE BLD-MCNC: 214 MG/DL (ref 70–108)
GLUCOSE BLD-MCNC: 77 MG/DL (ref 70–108)

## 2021-08-04 PROCEDURE — 99232 SBSQ HOSP IP/OBS MODERATE 35: CPT | Performed by: PHYSICIAN ASSISTANT

## 2021-08-04 PROCEDURE — 97535 SELF CARE MNGMENT TRAINING: CPT

## 2021-08-04 PROCEDURE — 82948 REAGENT STRIP/BLOOD GLUCOSE: CPT

## 2021-08-04 PROCEDURE — 97116 GAIT TRAINING THERAPY: CPT

## 2021-08-04 PROCEDURE — 2500000003 HC RX 250 WO HCPCS: Performed by: PHYSICIAN ASSISTANT

## 2021-08-04 PROCEDURE — 1200000000 HC SEMI PRIVATE

## 2021-08-04 PROCEDURE — 6370000000 HC RX 637 (ALT 250 FOR IP): Performed by: NURSE PRACTITIONER

## 2021-08-04 PROCEDURE — 2580000003 HC RX 258: Performed by: INTERNAL MEDICINE

## 2021-08-04 PROCEDURE — 6360000002 HC RX W HCPCS: Performed by: INTERNAL MEDICINE

## 2021-08-04 PROCEDURE — 6370000000 HC RX 637 (ALT 250 FOR IP): Performed by: INTERNAL MEDICINE

## 2021-08-04 PROCEDURE — 97110 THERAPEUTIC EXERCISES: CPT

## 2021-08-04 RX ADMIN — SENNOSIDES 8.6 MG: 8.6 TABLET, COATED ORAL at 20:35

## 2021-08-04 RX ADMIN — AMIODARONE HYDROCHLORIDE 200 MG: 200 TABLET ORAL at 09:29

## 2021-08-04 RX ADMIN — METOPROLOL TARTRATE 75 MG: 25 TABLET, FILM COATED ORAL at 09:29

## 2021-08-04 RX ADMIN — GABAPENTIN 100 MG: 100 CAPSULE ORAL at 13:35

## 2021-08-04 RX ADMIN — BUSPIRONE HYDROCHLORIDE 5 MG: 5 TABLET ORAL at 20:35

## 2021-08-04 RX ADMIN — ATORVASTATIN CALCIUM 40 MG: 40 TABLET, FILM COATED ORAL at 20:35

## 2021-08-04 RX ADMIN — RAMIPRIL 20 MG: 10 CAPSULE ORAL at 09:29

## 2021-08-04 RX ADMIN — CEFTRIAXONE SODIUM 1000 MG: 1 INJECTION, POWDER, FOR SOLUTION INTRAMUSCULAR; INTRAVENOUS at 18:45

## 2021-08-04 RX ADMIN — DOCUSATE SODIUM 100 MG: 100 CAPSULE ORAL at 20:35

## 2021-08-04 RX ADMIN — MICONAZOLE NITRATE: 20 POWDER TOPICAL at 20:36

## 2021-08-04 RX ADMIN — GABAPENTIN 100 MG: 100 CAPSULE ORAL at 20:35

## 2021-08-04 RX ADMIN — INSULIN GLARGINE 30 UNITS: 100 INJECTION, SOLUTION SUBCUTANEOUS at 20:38

## 2021-08-04 RX ADMIN — CITALOPRAM 10 MG: 20 TABLET, FILM COATED ORAL at 09:30

## 2021-08-04 RX ADMIN — SODIUM CHLORIDE, PRESERVATIVE FREE 10 ML: 5 INJECTION INTRAVENOUS at 20:43

## 2021-08-04 RX ADMIN — POTASSIUM CHLORIDE 10 MEQ: 750 TABLET, EXTENDED RELEASE ORAL at 09:30

## 2021-08-04 RX ADMIN — HYDROCODONE BITARTRATE AND ACETAMINOPHEN 1 TABLET: 5; 325 TABLET ORAL at 20:36

## 2021-08-04 RX ADMIN — MICONAZOLE NITRATE: 20 POWDER TOPICAL at 13:33

## 2021-08-04 RX ADMIN — AMLODIPINE BESYLATE 5 MG: 5 TABLET ORAL at 09:29

## 2021-08-04 RX ADMIN — CYCLOBENZAPRINE 10 MG: 10 TABLET, FILM COATED ORAL at 20:35

## 2021-08-04 RX ADMIN — SODIUM CHLORIDE, PRESERVATIVE FREE 10 ML: 5 INJECTION INTRAVENOUS at 09:32

## 2021-08-04 RX ADMIN — BUSPIRONE HYDROCHLORIDE 5 MG: 5 TABLET ORAL at 09:30

## 2021-08-04 RX ADMIN — METOPROLOL TARTRATE 75 MG: 25 TABLET, FILM COATED ORAL at 20:34

## 2021-08-04 RX ADMIN — POLYETHYLENE GLYCOL 3350 17 G: 17 POWDER, FOR SOLUTION ORAL at 09:30

## 2021-08-04 RX ADMIN — CYCLOBENZAPRINE 10 MG: 10 TABLET, FILM COATED ORAL at 09:30

## 2021-08-04 RX ADMIN — GABAPENTIN 100 MG: 100 CAPSULE ORAL at 09:29

## 2021-08-04 RX ADMIN — HYDROCODONE BITARTRATE AND ACETAMINOPHEN 1 TABLET: 5; 325 TABLET ORAL at 07:18

## 2021-08-04 RX ADMIN — ISOSORBIDE MONONITRATE 30 MG: 30 TABLET ORAL at 09:29

## 2021-08-04 RX ADMIN — DOCUSATE SODIUM 100 MG: 100 CAPSULE ORAL at 09:30

## 2021-08-04 RX ADMIN — APIXABAN 5 MG: 5 TABLET, FILM COATED ORAL at 20:35

## 2021-08-04 RX ADMIN — APIXABAN 5 MG: 5 TABLET, FILM COATED ORAL at 09:29

## 2021-08-04 RX ADMIN — FUROSEMIDE 20 MG: 40 TABLET ORAL at 09:29

## 2021-08-04 RX ADMIN — Medication 400 MG: at 09:28

## 2021-08-04 RX ADMIN — HYDROCODONE BITARTRATE AND ACETAMINOPHEN 1 TABLET: 5; 325 TABLET ORAL at 13:31

## 2021-08-04 RX ADMIN — INSULIN LISPRO 1 UNITS: 100 INJECTION, SOLUTION INTRAVENOUS; SUBCUTANEOUS at 12:05

## 2021-08-04 RX ADMIN — PANTOPRAZOLE SODIUM 40 MG: 40 TABLET, DELAYED RELEASE ORAL at 05:32

## 2021-08-04 ASSESSMENT — PAIN SCALES - GENERAL
PAINLEVEL_OUTOF10: 10
PAINLEVEL_OUTOF10: 9
PAINLEVEL_OUTOF10: 7
PAINLEVEL_OUTOF10: 10
PAINLEVEL_OUTOF10: 10
PAINLEVEL_OUTOF10: 0
PAINLEVEL_OUTOF10: 8
PAINLEVEL_OUTOF10: 0
PAINLEVEL_OUTOF10: 0

## 2021-08-04 ASSESSMENT — PAIN DESCRIPTION - LOCATION
LOCATION: LEG
LOCATION: BACK
LOCATION: LEG

## 2021-08-04 ASSESSMENT — PAIN DESCRIPTION - FREQUENCY
FREQUENCY: INTERMITTENT
FREQUENCY: CONTINUOUS

## 2021-08-04 ASSESSMENT — PAIN DESCRIPTION - DESCRIPTORS
DESCRIPTORS: ACHING;BURNING
DESCRIPTORS: ACHING

## 2021-08-04 ASSESSMENT — PAIN DESCRIPTION - ORIENTATION
ORIENTATION: RIGHT
ORIENTATION: RIGHT

## 2021-08-04 ASSESSMENT — PAIN DESCRIPTION - PAIN TYPE
TYPE: ACUTE PAIN

## 2021-08-04 NOTE — CARE COORDINATION
8/4/21, 7:24 AM EDT    [address] is investigating insurance copays. [facility] is patient and sister's first choice. [facility] is second choice of facilities in network with bed availability, and is willing to accept with precert. Notified [facility] that patient would not be coming to their facility, although precert was approved. Patient and sister now refusing [facility] or [facility]. [facility], [facility] and [facility] are all out of network with insurance. [address] do not have bed availability. [facility] will determine this a.m. if they can accept and start precert. [facility] is willing to accept and will start precert if patient and sister agree, as this is their second choice.

## 2021-08-04 NOTE — FLOWSHEET NOTE
[place] [address] PROGRESS NOTE      Patient: Raissa Valdovinos  Room #: 7K-05/005-A            YOB: 1940  Age: [de-identified] y.o. Gender: female            Admit Date & Time: 7/24/2021 12:51 PM    Assessment:  Pt was sitting up in bed. Pt expressed concerns about discharge plans. She believes she will be discharged to a nursing home, but a home that she feels comfortable moving to has yet to be found. Pt shared that she considers herself a part of [facility], but has not attended in some time. Pt struggles with feeling worthy of her children's care or with her ben. Interventions:   provided a listening and supportive presence and encouraged pt to voice her fears and concerns.  shared Scripture and prayed with the pt.  explored ways pt had demonstrated her love for her children and had led a faithful life to help her see she was \"worthy\". Outcomes:  Pt seemed more optimistic at the end of the encounter. Plan:  1. Provide spiritual care and support. 2.  Encourage pt to continue exploring ways she is worthy of love. Electronically signed by Rosalie Romberg, on 8/3/2021 at 10:37 PM.  [facility]  [phone]       08/03/21 7238   Encounter Summary   Services provided to: Patient   Referral/Consult From: [facility]   Support System Children   Place of [facility]   ([facility])   Continue Visiting Yes  (8/3)   Complexity of Encounter Moderate   Length of Encounter 45 minutes   Spiritual Assessment Completed Yes   Spiritual/Anabaptist   Type Spiritual struggle   Assessment Approachable;Tearful; Hopeless; Helplessness;Questioning ben;Questioning meaning/purpose   Intervention Active listening;Explored feelings, thoughts, concerns;Explored coping resources;Prayer;Sustaining presence/ Ministry of presence; Discussed meaning/purpose;Discussed relationship with God;Discussed illness/injury and it's impact   Outcome Connection/belonging;Comfort;Expressed gratitude;Engaged in conversation;Expressed feelings/needs/concerns; Shared life review

## 2021-08-04 NOTE — CARE COORDINATION
8/4/21, 3:48 PM EDT    DISCHARGE ON GOING [address] day: 9  Location: [facility]05/005-A Reason for admit: UTI (urinary tract infection) [N39.0]  Weakness [R53.1]   Procedure:   PICC line placed  Barriers to Discharge: ID follows, IV antibiotics, rash under Lt arm/armpit, Micotin powder. PT/OT follows. PCP: Laura Huff MD  Readmission Risk Score: 17%  Patient Goals/Plan/Treatment Preferences: SW assisting with new ECF; see Jaycee Mendosa notes from today.

## 2021-08-04 NOTE — PLAN OF CARE
Problem: Pain:  Goal: Pain level will decrease  Description: Pain level will decrease  Outcome: Ongoing     Problem: Falls - Risk of:  Goal: Will remain free from falls  Description: Will remain free from falls  Outcome: Ongoing     Problem: Skin Integrity:  Goal: Absence of new skin breakdown  Description: Absence of new skin breakdown  Outcome: Ongoing     Problem: DISCHARGE BARRIERS  Goal: Patient's continuum of care needs are met  Outcome: Ongoing     Problem: Nutrition  Goal: Optimal nutrition therapy  Outcome: Ongoing     Problem: Daily Care:  Goal: Daily care needs are met  Description: Daily care needs are met  Outcome: Ongoing

## 2021-08-04 NOTE — PROGRESS NOTES
[facility]  Occupational Therapy  Daily Note  Time:   Time In: 3291  Time Out: 7643  Timed Code Treatment Minutes: 45 Minutes  Minutes: 38          Date: 2021  Patient Name: Jeannette Hauser,   Gender: female      Room: [facility]005-A  MRN: [id number]  : 1940  ([de-identified] y.o.)  Referring Practitioner: Dr. Kory Gresham. Taj  Diagnosis: UTI  Additional Pertinent Hx: [de-identified] y.o. female who presents to the emergency department for evaluation of bilateral leg weakness. The patient states after her morning routine yesterday morning she noticed increased weakness when walking and is now unable to walk with her walker. She underwent low back surgery a month ago and states that since the surgery she has been having trouble walking and has had to ambulate using a walker. She also reports new paresthesias bilaterally in both lower extremities. She denies chest pain, shortness of breath, diaphoresis, diarrhea, or constipation. \"    Restrictions/Precautions:  Restrictions/Precautions: General Precautions, Fall Risk  Position Activity Restriction  Spinal Precautions: No Bending, No Lifting, No Twisting  Other position/activity restrictions: hx lumbar laminectomy L2-L3 and osteomyelitis tx with abx     SUBJECTIVE: Pt. Nurse approved OT treatment. Pt. Lying supine in bed upon entry pleasant and agreeable to OT session. Pt. Nurse notified during ADL task of redness under L underarm. PAIN: 5/10:BLE    Vitals: Vitals not assessed per clinical judgement, see nursing flowsheet    COGNITION: Decreased Insight and Decreased Safety Awareness    ADL:   Grooming: Contact Guard Assistance, with set-up and with verbal cues . Pt. completed oral care and hair care while seated due to fatigue   Bathing: Stand By Assistance, [employer] and with set-up. Upper Extremity Dressing: Minimal Assistance and with set-up. to don hospital gown  Lower Extremity Dressing: Dependent.   to don footies  Toileting: Contact Guard Assistance and with verbal cues . Toilet Transfer: [employer] and with verbal cues . [employer] Snare BALANCE:  Sitting Balance:  Stand By Assistance. while seated at EOB for bathing task  Standing Balance: [employer], with cues for safety. Pt. demo fatigue and limited standing tolerance of < 1 min. x 3 trials during ADL task    BED MOBILITY:  Supine to Sit: Stand By Assistance    Scooting: Stand By Assistance      TRANSFERS:  Sit to Stand:  [employer], cues for hand placement. Stand to Sit: [employer], cues for hand placement. FUNCTIONAL MOBILITY:  Assistive Device: Rolling Walker  Assist Level:  Contact Guard Assistance and with verbal cues . Distance: To and from bathroom  Pt. Demo fatigue with little activity. Pt. Required verbal safety cues , after mobility Pt. Attempting to sit plopping down without a chair present. ASSESSMENT:     Activity Tolerance:  Patient tolerance of  treatment: fair. Discharge Recommendations: [address], Patient would benefit from continued therapy after discharge   Equipment Recommendations: Other: continue to assess  Plan: Times per week: 5x  Current Treatment Recommendations: Strengthening, Patient/Caregiver Education & Training, Balance Training, Endurance Training, Safety Education & Training, Self-Care / ADL    Patient Education  Patient Education: ADL's, Importance of Increasing Activity and Assistive Device Safety and safety with functional mobility and transfers.      Goals  Short term goals  Time Frame for Short term goals: by discharge  Short term goal 1: Pt to complete LB dressign tasks using LHAE as needed with min A  Short term goal 2: Pt to increase standing tolerance to be able to complete grooming tasks at sink with CGA and no LOB during  Short term goal 3: Pt to complete toileting and transfer iwth CGA and no cues for safety    Following session, patient left in safe position with all fall risk precautions in place.

## 2021-08-04 NOTE — PROGRESS NOTES
feeling ready and the need to change her PICC line dressing everyday. Pt was suppose to have [place] therapy but never had a chance to initiate d/t being home less than 24 hours. Restrictions/Precautions:  Restrictions/Precautions: General Precautions, Fall Risk  Position Activity Restriction  Spinal Precautions: No Bending, No Lifting, No Twisting  Other position/activity restrictions: hx lumbar laminectomy L2-L3 and osteomyelitis tx with abx     SUBJECTIVE: RN approved session. Pt in bed upon arrival and stated in pain. Pt requested bathroom use during session. Pt in bed with all needs within reach upon arrival     PAIN: 10/10: R hip    Vitals: Vitals not assessed per clinical judgement, see nursing flowsheet    OBJECTIVE:  Bed Mobility:  Supine to Sit: Contact Guard Assistance  Sit to Supine: Minimal Assistance, with verbal cues  assist for LE management     Transfers:  Sit to Stand: Contact Guard Assistance  Stand to Sit:Contact Guard Assistancewith cues to reach back for seat and eccentric control    Ambulation:  [employer], with verbal cues , with increased time for completion  Distance: 15', 50'  Surface: Level Tile  Device:Rolling Walker  Gait Deviations: Forward Flexed Posture, Slow [place], Decreased Step Length Bilaterally, Decreased Weight Shift Right, Decreased Gait Speed and Decreased Heel Strike Bilaterally   Pt slightly unsteady with initial ambulation with self correction. Cues for posture, increased step length and height, and improved heel strike     Balance:  Dynamic Sitting Balance: Stand By Assistance. Pt sat and completed pericare with no UE support. Cues for posture and postioning on toilet. No sway or LOB  Dynamic Standing Balance: Contact Guard Assistance. Pt stood and competed clothing management with no UE support. Cues for posture and foot placement. Minimal sway and no LOB    Exercise:  Patient was guided in 1 set(s) 10 reps of exercise to both lower extremities.   Ankle pumps, Glut sets, Heelslides, Hip abduction/adduction, Straight leg raises, Seated marches, Long arc quads and Seated isometric hip adduction. Exercises were completed for increased independence with functional mobility. Functional Outcome Measures: Completed  AM-PAC Inpatient Mobility Raw Score : 16  AM-PAC Inpatient T-Scale Score : 40.78    ASSESSMENT:  Assessment: Patient progressing toward established goals. Activity Tolerance:  Patient tolerance of  treatment: good. Pt tolerated session well. Decreased balance, strength, and endurance. Pt fatigued quickly with ambulation. Pt would benefit from continued PT for improved functional mobility and strengthening      Equipment Recommendations:Equipment Needed: No  Discharge Recommendations:  Subacute/Skilled Nursing Facility    Plan: Times per week: 6 x O  Times per day: Daily  Current Treatment Recommendations: Strengthening, Neuromuscular Re-education, Home Exercise Program, Safety Education & Training, Balance Training, Endurance Training, Patient/Caregiver Education & Training, Functional Mobility Training, Transfer Training, Gait Training, Stair training    Patient Education  Patient Education: Plan of Care, [employer] Mobility, Transfers, Gait, Use of Gait [place], Verbal Exercise Instruction    Goals:  Patient goals : go somewhere for therapy, find out what is causing leg weakness  Short term goals  Time Frame for Short term goals: by hospital discharge  Short term goal 1: Supine to/from sit, using log roll, with modified independence for ease of transfers at discharge site. Short term goal 2: Sit to/from stand with modified independence for ease of transfers at discharge site. Short term goal 3: Improve 5x STS time to less than 14.8 seconds to reflect age appropriate norms, indicating improved functional strength and endurance. Short term goal 4: Ambulate 48' with RW and modified independence for home distance ambulation.   Short term goal 5: Ascend/descend 4 steps with BHR with modified independence for entry into home. Long term goals  Time Frame for Long term goals : N/A due to short ELOS. Following session, patient left in safe position with all fall risk precautions in place.

## 2021-08-04 NOTE — PROGRESS NOTES
[address] [place] [address]  INPATIENT PHYSICAL THERAPY  DAILY NOTE  [facility] ORTHOPEDICS 7K - 7K-05/005-A    Time In: 1106  Time Out: 1134  Timed Code Treatment Minutes: 28 Minutes  Minutes: 28          Date: 2021  Patient Name: Melyssa Lobo,  Gender:  female        MRN: [id number]  : 1940  ([de-identified] y.o.)     Referring Practitioner: Alisha Nevarez MD  Diagnosis: UTI  Additional Pertinent Hx: Per HPI: Prema Aguirre is a [de-identified] y.o. female who presents to the emergency department for evaluation of bilateral leg weakness. The patient states after her morning routine yesterday morning she noticed increased weakness when walking and is now unable to walk with her walker. She underwent low back surgery a month ago and states that since the surgery she has been having trouble walking and has had to ambulate using a walker. She also reports new paresthesias bilaterally in both lower extremities. She denies chest pain, shortness of breath, diaphoresis, diarrhea, or constipation. \"     Prior Level of Function:  Lives With: Daughter (daughter lives with pt)  Type of Home: Mobile home  Home Layout: One level  Home Access: Stairs to enter with rails (platform steps wher she can put her walker up on)  Entrance Stairs - Number of Steps: 4  Home Equipment: Rolling walker, 4 wheeled walker, Cane, [id number] [facility] Shower/Tub: Walk-in shower, Shower chair with back  Bathroom Toilet: Handicap height    ADL Assistance: Independent  Ambulation Assistance: Needs assistance (pt states on her last day at [facility] she was walking independently with walker, but that was the first time, and pt got much weaker following that)  Transfer Assistance: Independent  Active : No  Additional Comments: pt states she left the SNF [facility]) last Friday and felt she was not ready to go home yet. Pt stating when she left SNF, she was able to complete her ADL tasks indep.  Pt stating she was a \"nervous wreck\" going home d/t not feeling ready and the need to change her PICC line dressing everyday. [facility] never had a chance to initiate d/t being home less than 24 hours. Restrictions/Precautions:  Restrictions/Precautions: General Precautions, Fall Risk  Position Activity Restriction  Spinal Precautions: No Bending, No Lifting, No Twisting  Other position/activity restrictions: hx lumbar laminectomy L2-L3 and osteomyelitis tx with abx     SUBJECTIVE: RN approved session. Pt in bed upon arrival. Pt pleasant and agreeable to session. Pt in chair upon leaving with all needs within reach. Increased time for chair management and all mobility due to pain upon movement     PAIN: 2/10: L hip    Vitals: Vitals not assessed per clinical judgement, see nursing flowsheet    OBJECTIVE:  Bed Mobility:  Supine to Sit: Moderate Assistance, with verbal cues , with increased time for completion Assist for L LE and trunk with cues for sequencing. Pt demos difficulty with trunk control    Transfers:  Sit to Stand: [address], with increased time for completion, with verbal cues for postioning   Stand to Sit:Contact Guard Assistance, cues for hand placement, with verbal cues to reach back with B UEs an eccentric control    Ambulation:  Contact Guard Assistance, Minimal Assistance, with verbal cues , with increased time for completion  Distance: 5'x1  Surface: Level Tile  Device:Rolling Walker  Gait Deviations: Forward Flexed Posture, Slow Daxa, Decreased Step Length Bilaterally, Decreased Weight Shift Left, Decreased Gait Speed, Decreased Heel Strike Bilaterally, Narrow Base of Support, Unsteady Gait and Decreased Terminal Knee Extension  Cues for increased step length, posture, increased weight shift L and improved heel strike. Pt attempting to walk on toes of L LE with cues to place whole foot on ground. Pt stated afraid to place weight into L LE with support given.    Pt completed weight shifting after ambulation for improved heel strike and weight shift for improved gait     Balance:  Static Sitting Balance:  Stand By Assistance Pt sat EOB in prep for transfer with cues for posture and midline positioning due to lean to the left. 1 UE support used with cues to try with no UE support    Exercise:  Patient was guided in 1 set(s) 10 reps of exercise to both lower extremities. Ankle pumps, Glut sets, Seated marches and Long arc quads. Exercises were completed for increased independence with functional mobility. Mod assist for all L LE exercise     Functional Outcome Measures: Completed  AM-PAC Inpatient Mobility Raw Score : 16  AM-PAC Inpatient T-Scale Score : 40.78    ASSESSMENT:  Assessment: Patient progressing toward established goals. Activity Tolerance:  Patient tolerance of  treatment: good. Pt tolerated session well. Decreased endurance, strength, mobility, and balance. Pt demos difficulty with ambulation and bed mobility. Pt would benefit from continued PT for improved functional mobility and increased independence   Equipment Recommendations:Equipment Needed: No  Discharge Recommendations:  Subacute/Skilled Nursing Facility    Plan: Times per week: 6 x O  Times per day: Daily  Current Treatment Recommendations: Strengthening, Neuromuscular Re-education, Home Exercise Program, Safety Education & Training, Balance Training, Endurance Training, Patient/Caregiver Education & Training, Functional Mobility Training, Transfer Training, Gait Training, Stair training    Patient Education  Patient Education: Plan of Care, [employer] Mobility, Transfers, Gait, Use of Gait [place], Verbal Exercise Instruction    Goals:  Patient goals : go somewhere for therapy, find out what is causing leg weakness  Short term goals  Time Frame for Short term goals: by hospital discharge  Short term goal 1: Supine to/from sit, using log roll, with modified independence for ease of transfers at discharge site.   Short term goal 2: Sit to/from stand with modified independence for ease of transfers at discharge site. Short term goal 3: Improve 5x STS time to less than 14.8 seconds to reflect age appropriate norms, indicating improved functional strength and endurance. Short term goal 4: Ambulate 48' with RW and modified independence for home distance ambulation. Short term goal 5: Ascend/descend 4 steps with BHR with modified independence for entry into home. Long term goals  Time Frame for Long term goals : N/A due to short ELOS. Following session, patient left in safe position with all fall risk precautions in place.

## 2021-08-04 NOTE — PROGRESS NOTES
Hospitalist Progress Note    Patient:  Fortunato Francis      Unit/Bed:7K-05/005-A    YOB: 1940    MRN: [id number]       Acct: [de-identified]     PCP: Angel Pepper MD    Date of Admission: 7/24/2021    Assessment/Plan:    1. Physical deconditioning/debility--likely secondary to age and recent surgery; PT/OT, social work is assisting with placement  2. Hypokalemia--resolved with replacement  3. Osteomyelitis L2-L3 status post laminectomy--appreciate neurosurgical input; on Rocephin~infectious disease will not see as pt follows with Dr Merary Noonan; appears patient to be on Rocephin for total of 6 weeks~per review of old records from [facility] the patient appears to have been started on Rocephin on 6/30 for total of 6 weeks and appears to end on August 11  4. Diabetes mellitus type 2, uncontrolled--on Lantus along with low-dose sliding scale, monitor  5. Essential hypertension, uncontrolled--on Norvasc, Lopressor, Lasix Imdur, Altace, monitor  6. Chronic diastolic heart failure--on Lopressor, Lasix  7. VHD with moderate tricuspid regurgitation/mild aortic stenosis  8. Atrial fibrillation--on Eliquis, amiodarone  9. Normocytic anemia  10. Moderate malnutrition--per dietitian    Dispo: Awaiting pre-cert. Pt and family declined facility on 8/3 when original pre-cert was completed. Expected discharge date: Pending placement     Disposition:    [] Home       [] TCU       [] Rehab       [] Psych       [x] SNF       [] [place]       [] Other-    Chief Complaint: Inability to care for herself    Hospital Course: Per progress note dated 7/27: Malik Ibarra a [de-identified] y.o. female who presents to the emergency department for evaluation of bilateral leg weakness.  The patient states after her morning routine yesterday morning she noticed increased weakness when walking and is now unable to walk with her walker.  She underwent low back surgery a month ago and states that since the surgery she has been having trouble walking and has had to ambulate using a walker.  She also reports new paresthesias bilaterally in both lower extremities.  She denies chest pain, shortness of breath, diaphoresis, diarrhea, or constipation. The patient has no other acute complaints at this time. MRI lumbar spine 2 weeks agp showed discitis and   osteomyelitis at L2-L3, laminectomy L2-3 with possible abscess posteriorly external to the   spinal canal, no insignificant interval changes noted overall. Dr Cheyanne Rios and DR Alber Pearson   consulted who both agree patient does not need any surgical intervention at this time. Patient was to discharge previousy on 6 wks of antiobiotic however this was not done at   [facility]. Patient has PICC. Patient is feeling much improved, labs stable, afebrile. Patient   working with therapy. Dr Alber Pearson wrote for Rx for Ceftriaxone and this will be continued for   6 wks. Patient stable for d/c to SNF,\"    7/28--> hemodynamically stable, laboratory studies are stable except hemoglobin at 9.4 and this morning her glucose was 67    7/29--> infectious disease will not see patient as the patient follows with another ID doctor at [facility] so attempting to obtain medical records; hemodynamically stable, glucose at 97 this morning    7/30--> old records from [facility] reviewed and timeframe was established for Rocephin; hemodynamically stable; awaiting plan from  regarding placement    7/31--> hemodynamically stable, sugars past 24 hours ranged from 120-271    8/1--> hemodynamically stable, sugars past 24 hours have ranged     8/2-->did not participate with OT yesterday; hemodynamically stable; awaiting ECF pre-CERT    Subjective (past 24 hours): No acute events overnight. Pt states that her pain is relatively controlled. Pt has no issues or concerns at this time. Patient and family accepted a facility. Pre-CERT awaiting.       Medications:  Reviewed    Infusion Medications  dextrose      sodium chloride       Scheduled Medications    miconazole   Topical BID    senna  1 tablet Oral Nightly    polyethylene glycol  17 g Oral Daily    pantoprazole  40 mg Oral QAM AC    insulin lispro  0-6 Units Subcutaneous TID WC    insulin lispro  0-3 Units Subcutaneous Nightly    busPIRone  5 mg Oral BID    citalopram  10 mg Oral Daily    docusate sodium  100 mg Oral BID    gabapentin  100 mg Oral TID    amiodarone  200 mg Oral Daily    amLODIPine  5 mg Oral Daily    apixaban  5 mg Oral BID    atorvastatin  40 mg Oral Nightly    cyclobenzaprine  10 mg Oral BID    isosorbide mononitrate  30 mg Oral Daily    [Held by provider] labetalol  100 mg Oral BID    insulin glargine  30 Units Subcutaneous Nightly    magnesium oxide  400 mg Oral Daily    metoprolol tartrate  75 mg Oral BID    potassium chloride  10 mEq Oral Q MWF    ramipril  20 mg Oral Daily    sodium chloride flush  10 mL Intravenous 2 times per day    cefTRIAXone (ROCEPHIN) IV  1,000 mg Intravenous Q24H    furosemide  20 mg Oral Daily     PRN Meds: glucose, dextrose, glucagon (rDNA), dextrose, sodium chloride flush, sodium chloride, ondansetron **OR** ondansetron, polyethylene glycol, acetaminophen **OR** acetaminophen, potassium chloride **OR** potassium alternative oral replacement **OR** potassium chloride, magnesium sulfate, HYDROcodone 5 mg - acetaminophen      Intake/Output Summary (Last 24 hours) at 8/4/2021 1337  Last data filed at 8/4/2021 0915  Gross per 24 hour   Intake 440 ml   Output --   Net 440 ml       Diet:  ADULT DIET; Regular; 4 carb choices (60 gm/meal); Low Sodium (2 gm)  Adult Oral Nutrition Supplement; Diabetic Oral Supplement  Adult Oral Nutrition Supplement;  Other Oral Supplement; activa tid with meals    Exam:  BP (!) 109/58   Pulse 61   Temp 97.9 °F (36.6 °C) (Oral)   Resp 18   Ht 5' 4\" (1.626 m)   Wt 175 lb 14.8 oz (79.8 kg)   SpO2 95%   BMI 30.20 kg/m²     General appearance: No apparent distress, appears stated age and cooperative. HEENT: Pupils equal, round, and reactive to light. Conjunctivae/corneas clear. Neck: Supple, with full range of motion. No jugular venous distention. Trachea midline. Respiratory:  Normal respiratory effort. Clear to auscultation, bilaterally without Rales/Wheezes/Rhonchi. Cardiovascular: Regular rate and rhythm with normal S1/S2; (+) murmur noted. Abdomen: Soft, non-tender, non-distended with normal bowel sounds. Musculoskeletal: passive and active ROM x 4 extremities. Skin: Skin color, texture, turgor normal.    Neurologic:  Neurovascularly intact without any focal sensory/motor deficits. Cranial nerves: II-XII intact, grossly non-focal.  Psychiatric: Alert and oriented, thought content appropriate  Capillary Refill: Brisk,< 3 seconds   Peripheral Pulses: +2 palpable, equal bilaterally       Labs:   Recent Labs     08/02/21  0535   WBC 6.3   HGB 9.2*   HCT 31.3*        Recent Labs     08/02/21  0535      K 3.9      CO2 28   BUN 11   CREATININE 0.5   CALCIUM 9.3     Microbiology:    None    Radiology:  MRI LUMBAR SPINE W WO CONTRAST    Result Date: 7/25/2021  PROCEDURE: MRI LUMBAR SPINE W WO CONTRAST CLINICAL INFORMATION: Lumbar diskitis; LE weakness. COMPARISON: Report of previous  MRI scan dated 6/29/2021. . TECHNIQUE: Sagittal and axial T1 and T2-weighted images were obtained to the lumbar spine. Postcontrast axial and sagittal T1-weighted images were also obtained. FINDINGS: There is abnormal signal intensity in approximately the L1 vertebral body,, superior aspect of the L2 vertebral body, T12-L1 and L1-2 disc spaces consistent with vertebral body osteomyelitis and disc space infection. There is possible previous instrumentation at L1-2. There is abnormal signal intensity enhancement the soft tissues dorsally at L1 and L2. The lumbar vertebral bodies are normally aligned. There is normal marrow signal throughout.   There is no bone paraspinal muscles bilaterally. 11. There is a left hip replacement. **This report has been created using voice recognition software. It may contain minor errors which are inherent in voice recognition technology. ** Final report electronically signed by DR Clair Abraham on 7/25/2021 1:34 PM    XR CHEST 1 VIEW    Result Date: 7/25/2021  PROCEDURE: XR CHEST 1 VIEW CLINICAL INFORMATION: picc line placement COMPARISON: No prior study. TECHNIQUE:  AP chest single view  FINDINGS: There is a right upper extremity PICC present with the tip overlying the cavoatrial junction. The heart size is normal. No focal consolidation, pleural effusion or pneumothorax is seen. Left shoulder joint arthrosis is noted. 1. Right upper extremity PICC tip projects over the cavoatrial junction. **This report has been created using voice recognition software. It may contain minor errors which are inherent in voice recognition technology. ** Final report electronically signed by Dr. Ramesh Kan on 7/25/2021 10:30 AM      DVT prophylaxis: [] Lovenox                                 [] SCDs                                 [] SQ Heparin                                 [] Encourage ambulation           [x] Already on Anticoagulation~Eliquis     Code Status: DNR-CCA    Tele:   [] yes             [x] no    Active Hospital Problems    Diagnosis Date Noted    Moderate malnutrition ([address] [E44.0] 07/28/2021     Class: Acute    Weakness [R53.1] 07/26/2021    UTI (urinary tract infection) [N39.0] 07/24/2021       Electronically signed by PRASHANT Gilbert on 8/4/2021 at 1:37 PM

## 2021-08-04 NOTE — CARE COORDINATION
8/4/21, 1:26 PM EDT    [address] can accept, has a shared room available. Facility will start precert. Discussed with [facility] and with her sister, Gee Downey, who prefer [facility]. Waiting precert.

## 2021-08-05 LAB
GLUCOSE BLD-MCNC: 153 MG/DL (ref 70–108)
GLUCOSE BLD-MCNC: 239 MG/DL (ref 70–108)
GLUCOSE BLD-MCNC: 75 MG/DL (ref 70–108)
GLUCOSE BLD-MCNC: 78 MG/DL (ref 70–108)
SARS-COV-2, NAAT: DETECTED

## 2021-08-05 PROCEDURE — 6360000002 HC RX W HCPCS: Performed by: INTERNAL MEDICINE

## 2021-08-05 PROCEDURE — 87635 SARS-COV-2 COVID-19 AMP PRB: CPT

## 2021-08-05 PROCEDURE — 1200000000 HC SEMI PRIVATE

## 2021-08-05 PROCEDURE — 6370000000 HC RX 637 (ALT 250 FOR IP): Performed by: NURSE PRACTITIONER

## 2021-08-05 PROCEDURE — 97535 SELF CARE MNGMENT TRAINING: CPT

## 2021-08-05 PROCEDURE — 6370000000 HC RX 637 (ALT 250 FOR IP): Performed by: INTERNAL MEDICINE

## 2021-08-05 PROCEDURE — 99232 SBSQ HOSP IP/OBS MODERATE 35: CPT | Performed by: PHYSICIAN ASSISTANT

## 2021-08-05 PROCEDURE — 97110 THERAPEUTIC EXERCISES: CPT

## 2021-08-05 PROCEDURE — 97116 GAIT TRAINING THERAPY: CPT

## 2021-08-05 PROCEDURE — 82948 REAGENT STRIP/BLOOD GLUCOSE: CPT

## 2021-08-05 PROCEDURE — 2580000003 HC RX 258: Performed by: INTERNAL MEDICINE

## 2021-08-05 RX ORDER — CITALOPRAM 10 MG/1
10 TABLET ORAL DAILY
Qty: 30 TABLET | Refills: 3 | DISCHARGE
Start: 2021-08-06 | End: 2021-08-11

## 2021-08-05 RX ORDER — HYDROCODONE BITARTRATE AND ACETAMINOPHEN 5; 325 MG/1; MG/1
1 TABLET ORAL EVERY 6 HOURS PRN
Refills: 0 | Status: SHIPPED | DISCHARGE
Start: 2021-08-05 | End: 2021-08-08

## 2021-08-05 RX ORDER — GABAPENTIN 100 MG/1
100 CAPSULE ORAL 3 TIMES DAILY
Qty: 3 CAPSULE | Refills: 3 | DISCHARGE
Start: 2021-08-05 | End: 2021-08-11

## 2021-08-05 RX ORDER — PANTOPRAZOLE SODIUM 40 MG/1
40 TABLET, DELAYED RELEASE ORAL
Qty: 30 TABLET | Refills: 3 | DISCHARGE
Start: 2021-08-06 | End: 2021-08-11

## 2021-08-05 RX ORDER — BUSPIRONE HYDROCHLORIDE 5 MG/1
5 TABLET ORAL 2 TIMES DAILY
DISCHARGE
Start: 2021-08-05 | End: 2021-08-11

## 2021-08-05 RX ADMIN — FUROSEMIDE 20 MG: 40 TABLET ORAL at 08:49

## 2021-08-05 RX ADMIN — GABAPENTIN 100 MG: 100 CAPSULE ORAL at 20:47

## 2021-08-05 RX ADMIN — ISOSORBIDE MONONITRATE 30 MG: 30 TABLET ORAL at 08:49

## 2021-08-05 RX ADMIN — BUSPIRONE HYDROCHLORIDE 5 MG: 5 TABLET ORAL at 20:47

## 2021-08-05 RX ADMIN — DOCUSATE SODIUM 100 MG: 100 CAPSULE ORAL at 08:49

## 2021-08-05 RX ADMIN — HYDROCODONE BITARTRATE AND ACETAMINOPHEN 1 TABLET: 5; 325 TABLET ORAL at 05:01

## 2021-08-05 RX ADMIN — PANTOPRAZOLE SODIUM 40 MG: 40 TABLET, DELAYED RELEASE ORAL at 06:33

## 2021-08-05 RX ADMIN — ATORVASTATIN CALCIUM 40 MG: 40 TABLET, FILM COATED ORAL at 20:47

## 2021-08-05 RX ADMIN — GABAPENTIN 100 MG: 100 CAPSULE ORAL at 08:49

## 2021-08-05 RX ADMIN — Medication 400 MG: at 08:49

## 2021-08-05 RX ADMIN — APIXABAN 5 MG: 5 TABLET, FILM COATED ORAL at 08:49

## 2021-08-05 RX ADMIN — CITALOPRAM 10 MG: 20 TABLET, FILM COATED ORAL at 08:49

## 2021-08-05 RX ADMIN — AMIODARONE HYDROCHLORIDE 200 MG: 200 TABLET ORAL at 08:49

## 2021-08-05 RX ADMIN — METOPROLOL TARTRATE 75 MG: 25 TABLET, FILM COATED ORAL at 08:49

## 2021-08-05 RX ADMIN — ACETAMINOPHEN 650 MG: 325 TABLET ORAL at 01:36

## 2021-08-05 RX ADMIN — DOCUSATE SODIUM 100 MG: 100 CAPSULE ORAL at 20:47

## 2021-08-05 RX ADMIN — INSULIN GLARGINE 30 UNITS: 100 INJECTION, SOLUTION SUBCUTANEOUS at 21:47

## 2021-08-05 RX ADMIN — CEFTRIAXONE SODIUM 1000 MG: 1 INJECTION, POWDER, FOR SOLUTION INTRAMUSCULAR; INTRAVENOUS at 20:55

## 2021-08-05 RX ADMIN — INSULIN LISPRO 1 UNITS: 100 INJECTION, SOLUTION INTRAVENOUS; SUBCUTANEOUS at 11:44

## 2021-08-05 RX ADMIN — APIXABAN 5 MG: 5 TABLET, FILM COATED ORAL at 20:47

## 2021-08-05 RX ADMIN — SODIUM CHLORIDE, PRESERVATIVE FREE 10 ML: 5 INJECTION INTRAVENOUS at 08:50

## 2021-08-05 RX ADMIN — CYCLOBENZAPRINE 10 MG: 10 TABLET, FILM COATED ORAL at 08:49

## 2021-08-05 RX ADMIN — MICONAZOLE NITRATE: 20 POWDER TOPICAL at 08:49

## 2021-08-05 RX ADMIN — SENNOSIDES 8.6 MG: 8.6 TABLET, COATED ORAL at 20:46

## 2021-08-05 RX ADMIN — HYDROCODONE BITARTRATE AND ACETAMINOPHEN 1 TABLET: 5; 325 TABLET ORAL at 11:48

## 2021-08-05 RX ADMIN — GABAPENTIN 100 MG: 100 CAPSULE ORAL at 14:45

## 2021-08-05 RX ADMIN — BUSPIRONE HYDROCHLORIDE 5 MG: 5 TABLET ORAL at 08:49

## 2021-08-05 RX ADMIN — CYCLOBENZAPRINE 10 MG: 10 TABLET, FILM COATED ORAL at 20:47

## 2021-08-05 RX ADMIN — AMLODIPINE BESYLATE 5 MG: 5 TABLET ORAL at 08:49

## 2021-08-05 RX ADMIN — HYDROCODONE BITARTRATE AND ACETAMINOPHEN 1 TABLET: 5; 325 TABLET ORAL at 20:54

## 2021-08-05 RX ADMIN — RAMIPRIL 20 MG: 10 CAPSULE ORAL at 08:49

## 2021-08-05 RX ADMIN — SODIUM CHLORIDE, PRESERVATIVE FREE 10 ML: 5 INJECTION INTRAVENOUS at 20:47

## 2021-08-05 RX ADMIN — METOPROLOL TARTRATE 75 MG: 25 TABLET, FILM COATED ORAL at 20:47

## 2021-08-05 ASSESSMENT — PAIN SCALES - GENERAL
PAINLEVEL_OUTOF10: 10
PAINLEVEL_OUTOF10: 6
PAINLEVEL_OUTOF10: 0
PAINLEVEL_OUTOF10: 7
PAINLEVEL_OUTOF10: 0
PAINLEVEL_OUTOF10: 3
PAINLEVEL_OUTOF10: 6

## 2021-08-05 NOTE — CARE COORDINATION
8/5/21, 1:48 PM EDT    Patient goals/plan/ treatment preferences discussed by  and . Patient goals/plan/ treatment preferences reviewed with patient/ family. Patient/ family verbalize understanding of discharge plan and are in agreement with goal/plan/treatment preferences. Understanding was demonstrated using the teach back method. AVS provided by RN at time of discharge, which includes all necessary medical information pertaining to the patients current course of illness, treatment, post-discharge goals of care, and treatment preferences. IMM Letter  IMM Letter given to Patient/Family/Significant other/Guardian/POA/by[de-identified]   IMM Letter date given[de-identified] 08/05/21  IMM Letter time given[de-identified] 1000     [facility] notified that pre-cert has been approved today. Patient discharging to [facility]. EMMY updated Patient's sister and she will contact Patient later today regarding her transfer as she is currently working. RN updated and blue envelope on chart along with number for report and AVS.  4:30pm ambulette arranged for transport with [facility] and faxed.

## 2021-08-05 NOTE — PLAN OF CARE
Problem: Infection - Central Venous Catheter-Associated Bloodstream Infection:  Goal: Will show no infection signs and symptoms  Description: Will show no infection signs and symptoms  Outcome: Ongoing

## 2021-08-05 NOTE — PROGRESS NOTES
[facility]  INPATIENT PHYSICAL THERAPY  DAILY NOTE  [facility] ORTHOPEDICS 7K - 7K-05/005-A    Time In: 1113  Time Out: 1136  Timed Code Treatment Minutes: 23 Minutes  Minutes: 23          Date: 2021  Patient Name: Cate Mills,  Gender:  female        MRN: [id number]  : 1940  ([de-identified] y.o.)     Referring Practitioner: Haven Andrade MD  Diagnosis: UTI  Additional Pertinent Hx: Per HPI: Rina Santana is a [de-identified] y.o. female who presents to the emergency department for evaluation of bilateral leg weakness. The patient states after her morning routine yesterday morning she noticed increased weakness when walking and is now unable to walk with her walker. She underwent low back surgery a month ago and states that since the surgery she has been having trouble walking and has had to ambulate using a walker. She also reports new paresthesias bilaterally in both lower extremities. She denies chest pain, shortness of breath, diaphoresis, diarrhea, or constipation. \"     Prior Level of Function:  Lives With: Daughter (daughter lives with pt)  Type of Home: Mobile home  Home Layout: One level  Home Access: Stairs to enter with rails (platform steps wher she can put her walker up on)  Entrance Stairs - Number of Steps: 4  Home Equipment: Rolling walker, 4 wheeled walker, Cane, [id number] [facility] Shower/Tub: Walk-in shower, Shower chair with back  Bathroom Toilet: Handicap height    ADL Assistance: Independent  Ambulation Assistance: Needs assistance (pt states on her last day at [facility] she was walking independently with walker, but that was the first time, and pt got much weaker following that)  Transfer Assistance: Independent  Active : No  Additional Comments: pt states she left the SNF [facility]) last Friday and felt she was not ready to go home yet. Pt stating when she left SNF, she was able to complete her ADL tasks indep.  Pt stating she was a \"nervous wreck\" going home d/t not feeling ready and the need to change her PICC line dressing everyday. [facility] but never had a chance to initiate d/t being home less than 24 hours. Restrictions/Precautions:  Restrictions/Precautions: General Precautions, Fall Risk  Position Activity Restriction  Spinal Precautions: No Bending, No Lifting, No Twisting  Other position/activity restrictions: hx lumbar laminectomy L2-L3 and osteomyelitis tx with abx     SUBJECTIVE: Rn approved session. Pt in bed upon arrival. Pt agreeable to session and returned to bed with all needs within reach     PAIN: not Rated, R LE once OOB    Vitals: Vitals not assessed per clinical judgement, see nursing flowsheet    OBJECTIVE:  Bed Mobility:  Supine to Sit: Stand By Assistance  Sit to Supine: Minimal Assistance  Assist for LE management  Pt able to complete 3 bridges to coot laterally in bed with good clearance of buttocks     Transfers:  Sit to Stand: Contact Guard Assistance. Cues to push from bed not walker   Stand to [facility] 68. Cues to reach back with B UEs    Ambulation:  Contact Guard Assistance, with verbal cues , with increased time for completion  Distance: 55'x1  Surface: Level Tile  Device:Rolling Walker  Gait Deviations: Forward Flexed Posture, Slow [place], Decreased Step Length Bilaterally, Decreased Weight Shift Right, Decreased Gait Speed, Decreased Heel Strike Bilaterally and Unsteady Gait  [place] through gait pt stated was going to fall due to LE weakness with encouragement and cueing provided to make it to room safely. Cues for decreased shoulder elevation, posture, increased step length, and improved heel strike. Balance:  Dynamic Sitting Balance: Stand By Assistance Pt sat EOB and completed LE exercises with no UE support and no sway or LOB     Exercise:  Patient was guided in 1 set(s) 10-15 reps of exercise to both lower extremities.   Glut sets, Quad sets, Heelslides, Hip abduction/adduction, Rhett Section (3 reps), Seated marches, Seated hamstring curls (with manual resistance), Long arc quads (with manual resistance) and Seated isometric hip adduction. Exercises were completed for increased independence with functional mobility. Functional Outcome Measures: Completed  AM-PAC Inpatient Mobility Raw Score : 16  AM-PAC Inpatient T-Scale Score : 40.78    ASSESSMENT:  Assessment: Patient progressing toward established goals. Activity Tolerance:  Patient tolerance of  treatment: good. Pt tolerated session well. Decreased endurance, strength, and balance. Pt demos increased difficulty with long distance ambulation. Equipment Recommendations:Equipment Needed: No  Discharge Recommendations:  Subacute/Skilled Nursing Facility     Plan: Times per week: 6 x O  Times per day: Daily  Current Treatment Recommendations: Strengthening, Neuromuscular Re-education, Home Exercise Program, Safety Education & Training, Balance Training, Endurance Training, Patient/Caregiver Education & Training, Functional Mobility Training, Transfer Training, Gait Training, Stair training    Patient Education  Patient Education: Plan of Care, [employer] Mobility, Transfers, Gait, Use of Gait [place], Verbal Exercise Instruction    Goals:  Patient goals : go somewhere for therapy, find out what is causing leg weakness  Short term goals  Time Frame for Short term goals: by hospital discharge  Short term goal 1: Supine to/from sit, using log roll, with modified independence for ease of transfers at discharge site. Short term goal 2: Sit to/from stand with modified independence for ease of transfers at discharge site. Short term goal 3: Improve 5x STS time to less than 14.8 seconds to reflect age appropriate norms, indicating improved functional strength and endurance. Short term goal 4: Ambulate 48' with RW and modified independence for home distance ambulation.   Short term goal 5: Ascend/descend 4 steps with BHR with modified independence for entry into home. Long term goals  Time Frame for Long term goals : N/A due to short ELOS. Following session, patient left in safe position with all fall risk precautions in place.

## 2021-08-05 NOTE — PROGRESS NOTES
Hospitalist Progress Note    Patient:  Jeannette Hauser      Unit/Bed:7K-05/005-A    YOB: 1940    MRN: [id number]       Acct: [de-identified]     PCP: Lorie Aceves MD    Date of Admission: 7/24/2021    Assessment/Plan:    1. Incidental positive COVID: Pt needed to be COVID tested prior to being discharged. Pt was COVID positive. Pt is asymptomatic. Will speak with SW tomorrow about DC plan. 2. Physical deconditioning/debility--likely secondary to age and recent surgery; PT/OT, social work is assisting with placement  3. Hypokalemia--resolved with replacement  4. Osteomyelitis L2-L3 status post laminectomy--appreciate neurosurgical input; on Rocephin~infectious disease will not see as pt follows with Dr Lasha Epps; appears patient to be on Rocephin for total of 6 weeks~per review of old records from [facility] the patient appears to have been started on Rocephin on 6/30 for total of 6 weeks and appears to end on August 11  5. Diabetes mellitus type 2, uncontrolled--on Lantus along with low-dose sliding scale, monitor  6. Essential hypertension, uncontrolled--on Norvasc, Lopressor, Lasix Imdur, Altace, monitor  7. Chronic diastolic heart failure--on Lopressor, Lasix  8. VHD with moderate tricuspid regurgitation/mild aortic stenosis  9. Atrial fibrillation--on Eliquis, amiodarone  10. Normocytic anemia  11. Moderate malnutrition--per dietitian    Dispo: Awaiting placement for patient since she is now COVID positive.      Expected discharge date: Pending placement     Disposition:    [] Home       [] TCU       [] Rehab       [] Psych       [x] SNF       [] [place]       [] Other-    Chief Complaint: Inability to care for herself    Hospital Course: Per progress note dated 7/27: Delle Lesches a [de-identified] y.o. female who presents to the emergency department for evaluation of bilateral leg weakness.  The patient states after her morning routine yesterday morning she noticed increased weakness when walking and is now unable to walk with her walker. She underwent low back surgery a month ago and states that since the surgery she has been having trouble walking and has had to ambulate using a walker.  She also reports new paresthesias bilaterally in both lower extremities.  She denies chest pain, shortness of breath, diaphoresis, diarrhea, or constipation. The patient has no other acute complaints at this time. MRI lumbar spine 2 weeks agp showed discitis and   osteomyelitis at L2-L3, laminectomy L2-3 with possible abscess posteriorly external to the   spinal canal, no insignificant interval changes noted overall. Dr Cheyanne Rios and DR Alber Pearson   consulted who both agree patient does not need any surgical intervention at this time. Patient was to discharge previousy on 6 wks of antiobiotic however this was not done at   [facility]. Patient has PICC. Patient is feeling much improved, labs stable, afebrile. Patient   working with therapy. Dr Alber Pearson wrote for Rx for Ceftriaxone and this will be continued for   6 wks. Patient stable for d/c to SNF,\"    7/28--> hemodynamically stable, laboratory studies are stable except hemoglobin at 9.4 and this morning her glucose was 67    7/29--> infectious disease will not see patient as the patient follows with another ID doctor at [facility] so attempting to obtain medical records; hemodynamically stable, glucose at 97 this morning    7/30--> old records from [facility] reviewed and timeframe was established for Rocephin; hemodynamically stable; awaiting plan from  regarding placement    7/31--> hemodynamically stable, sugars past 24 hours ranged from 120-271    8/1--> hemodynamically stable, sugars past 24 hours have ranged     8/2-->did not participate with OT yesterday; hemodynamically stable; awaiting ECF pre-CERT    Subjective (past 24 hours): No acute events overnight. Pt tested positive for COVID. Unable to be DC'ed.  Pt has no pain or issues. Pt will not be transferred units due to being asymptomatic. Medications:  Reviewed    Infusion Medications    dextrose      sodium chloride       Scheduled Medications    miconazole   Topical BID    senna  1 tablet Oral Nightly    polyethylene glycol  17 g Oral Daily    pantoprazole  40 mg Oral QAM AC    insulin lispro  0-6 Units Subcutaneous TID WC    insulin lispro  0-3 Units Subcutaneous Nightly    busPIRone  5 mg Oral BID    citalopram  10 mg Oral Daily    docusate sodium  100 mg Oral BID    gabapentin  100 mg Oral TID    amiodarone  200 mg Oral Daily    amLODIPine  5 mg Oral Daily    apixaban  5 mg Oral BID    atorvastatin  40 mg Oral Nightly    cyclobenzaprine  10 mg Oral BID    isosorbide mononitrate  30 mg Oral Daily    [Held by provider] labetalol  100 mg Oral BID    insulin glargine  30 Units Subcutaneous Nightly    magnesium oxide  400 mg Oral Daily    metoprolol tartrate  75 mg Oral BID    potassium chloride  10 mEq Oral Q MWF    ramipril  20 mg Oral Daily    sodium chloride flush  10 mL Intravenous 2 times per day    cefTRIAXone (ROCEPHIN) IV  1,000 mg Intravenous Q24H    furosemide  20 mg Oral Daily     PRN Meds: glucose, dextrose, glucagon (rDNA), dextrose, sodium chloride flush, sodium chloride, ondansetron **OR** ondansetron, polyethylene glycol, acetaminophen **OR** acetaminophen, potassium chloride **OR** potassium alternative oral replacement **OR** potassium chloride, magnesium sulfate, HYDROcodone 5 mg - acetaminophen      Intake/Output Summary (Last 24 hours) at 8/5/2021 1657  Last data filed at 8/5/2021 1433  Gross per 24 hour   Intake 780 ml   Output --   Net 780 ml       Diet:  ADULT DIET; Regular; 4 carb choices (60 gm/meal); Low Sodium (2 gm)  Adult Oral Nutrition Supplement; Diabetic Oral Supplement  Adult Oral Nutrition Supplement;  Other Oral Supplement; activa tid with meals    Exam:  /69   Pulse 61   Temp 97.8 °F (36.6 °C) (Oral) Resp 16   Ht 5' 4\" (1.626 m)   Wt 175 lb 14.8 oz (79.8 kg)   SpO2 96%   BMI 30.20 kg/m²     General appearance: No apparent distress, appears stated age and cooperative. HEENT: Pupils equal, round, and reactive to light. Conjunctivae/corneas clear. Neck: Supple, with full range of motion. No jugular venous distention. Trachea midline. Respiratory:  Normal respiratory effort. Clear to auscultation, bilaterally without Rales/Wheezes/Rhonchi. Cardiovascular: Regular rate and rhythm with normal S1/S2; (+) murmur noted. Abdomen: Soft, non-tender, non-distended with normal bowel sounds. Musculoskeletal: passive and active ROM x 4 extremities. Skin: Skin color, texture, turgor normal.    Neurologic:  Neurovascularly intact without any focal sensory/motor deficits. Cranial nerves: II-XII intact, grossly non-focal.  Psychiatric: Alert and oriented, thought content appropriate  Capillary Refill: Brisk,< 3 seconds   Peripheral Pulses: +2 palpable, equal bilaterally       Labs:   No results for input(s): WBC, HGB, HCT, PLT in the last 72 hours. No results for input(s): NA, K, CL, CO2, BUN, CREATININE, CALCIUM, PHOS in the last 72 hours. Invalid input(s): [address]  Microbiology:    None    Radiology:  MRI LUMBAR SPINE W WO CONTRAST    Result Date: 7/25/2021  PROCEDURE: MRI LUMBAR SPINE W WO CONTRAST CLINICAL INFORMATION: Lumbar diskitis; LE weakness. COMPARISON: Report of previous  MRI scan dated 6/29/2021. . TECHNIQUE: Sagittal and axial T1 and T2-weighted images were obtained to the lumbar spine. Postcontrast axial and sagittal T1-weighted images were also obtained. FINDINGS: There is abnormal signal intensity in approximately the L1 vertebral body,, superior aspect of the L2 vertebral body, T12-L1 and L1-2 disc spaces consistent with vertebral body osteomyelitis and disc space infection. There is possible previous instrumentation at L1-2.  There is abnormal signal intensity enhancement the soft tissues dorsally at L1 and L2. The lumbar vertebral bodies are normally aligned. There is normal marrow signal throughout. There is no bone marrow edema. There are no compression fractures. No pars defects are noted. The discs have normal signal throughout. The visualized aspects of the distal spinal cord are normal. The nerve roots of the cauda equina and the tip of the conus are normal. There are no gross abnormalities in the distal thoracic spine. On the axial images, at T12-L1, there is mild canal and moderate bilateral from stenosis At L1-L2, there is mild canal and moderate to severe bilateral foraminal stenosis. At L2-3, there is mild to moderate canal and moderate bilateral foraminal stenosis. At L3-4, there is moderate canal and moderate to severe bilateral foraminal stenosis At L4-5, there is mild-to-moderate canal and moderate bilateral foraminal stenosis At L5-S1, there is mild canal and moderate bilateral foraminal stenosis. There is degenerative change involving the sacroiliac joints bilaterally. There is no abnormal enhancement. There is atrophy involving the iliacus, psoas and paraspinal muscles bilaterally. There is a left hip replacement. 1. Previous instrumentation at L1-2. 2. Abnormal signal intensity within the L1 vertebral body and superior aspect of the L2 vertebral body consistent with osteomyelitis. 3. Abnormal signal intensity within the T12-L1 and L1-2 disc spaces, consistent with disc space infection. 4. Abnormal signal intensity and enhancement in the soft tissues dorsally at L1 and L2 consistent with postoperative and possible inflammatory changes.  5. There is mild-to-moderate canal and moderate bilateral from stenosis at L2-3 and L4-5. 6. There is moderate canal and moderate to severe bilateral foraminal stenosis at L3-4. 7. There is mild canal and moderate severe bilateral foraminal stenosis at L1-2. 8. There is mild canal and moderate bilateral foraminal stenosis at T12-L1 and L5-S1. 9. There is degenerative change involving the sacroiliac joints bilaterally. 10. There is atrophy involving the because, psoas and paraspinal muscles bilaterally. 11. There is a left hip replacement. **This report has been created using voice recognition software. It may contain minor errors which are inherent in voice recognition technology. ** Final report electronically signed by DR Tamika Cowan on 7/25/2021 1:34 PM    XR CHEST 1 VIEW    Result Date: 7/25/2021  PROCEDURE: XR CHEST 1 VIEW CLINICAL INFORMATION: picc line placement COMPARISON: No prior study. TECHNIQUE:  AP chest single view  FINDINGS: There is a right upper extremity PICC present with the tip overlying the cavoatrial junction. The heart size is normal. No focal consolidation, pleural effusion or pneumothorax is seen. Left shoulder joint arthrosis is noted. 1. Right upper extremity PICC tip projects over the cavoatrial junction. **This report has been created using voice recognition software. It may contain minor errors which are inherent in voice recognition technology. ** Final report electronically signed by Dr. Marleni Urias on 7/25/2021 10:30 AM      DVT prophylaxis: [] Lovenox                                 [] SCDs                                 [] SQ Heparin                                 [] Encourage ambulation           [x] Already on Anticoagulation~Eliquis     Code Status: DNR-CCA    Tele:   [] yes             [x] no    Active Hospital Problems    Diagnosis Date Noted    Moderate malnutrition ([address] [E44.0] 07/28/2021     Class: Acute    Weakness [R53.1] 07/26/2021    UTI (urinary tract infection) [N39.0] 07/24/2021       Electronically signed by PRASHANT Tyson on 8/5/2021 at 4:57 PM

## 2021-08-05 NOTE — PROGRESS NOTES
[facility]  Occupational Therapy  Daily Note  Time:   Time In: 09  Time Out: 1000  Timed Code Treatment Minutes: 30 Minutes  Minutes: 30          Date: 2021  Patient Name: Hilda Burnett,   Gender: female      Room: [facility]005-A  MRN: [id number]  : 1940  ([de-identified] y.o.)  Referring Practitioner: Dr. Tian Bucio. Taj  Diagnosis: UTI  Additional Pertinent Hx: [de-identified] y.o. female who presents to the emergency department for evaluation of bilateral leg weakness. The patient states after her morning routine yesterday morning she noticed increased weakness when walking and is now unable to walk with her walker. She underwent low back surgery a month ago and states that since the surgery she has been having trouble walking and has had to ambulate using a walker. She also reports new paresthesias bilaterally in both lower extremities. She denies chest pain, shortness of breath, diaphoresis, diarrhea, or constipation. \"    Restrictions/Precautions:  Restrictions/Precautions: General Precautions, Fall Risk  Position Activity Restriction  Spinal Precautions: No Bending, No Lifting, No Twisting  Other position/activity restrictions: hx lumbar laminectomy L2-L3 and osteomyelitis tx with abx     SUBJECTIVE: Pt. RN okayed OT treatment session. Pt. In bed upon arrival and agreeable to OT treatment. PAIN: 410: BLE's    Vitals: Vitals not assessed per clinical judgement, see nursing flowsheet    COGNITION: Decreased Insight and Decreased Safety Awareness    ADL:   Grooming: Contact Guard Assistance and with verbal cues . to stand at sink and complete hand hygiene  Lower Extremity Dressing: Dependent. to don socks  Toiletin [address], Dependent and with verbal cues . Depenedent to complete posterior arianna care  Toilet Transfer: Contact Guard Assistance and with verbal cues . Benedetta Ou BALANCE:  Standing Balance: Contact Guard Assistance, with cues for safety.       BED MOBILITY:  Supine at sink with CGA and no LOB during  Short term goal 3: Pt to complete toileting and transfer iwth CGA and no cues for safety    Following session, patient left in safe position with all fall risk precautions in place.

## 2021-08-05 NOTE — CARE COORDINATION
8/5/21, 4:44 PM EDT    DISCHARGE PLANNING EVALUATION    pc received from [facility], states pt was supposed to be transferred to ecf today and Covid test came back pos. EMMY suggested nurse to call ecf to see if they will still accept which is unlikely. EMMY will inform 7K sw tomorrow if pt does not go tonight. Will need facility that is accepting covid. EMMY called [id number] [address] to see if they are still accepting covid pts. Tee  states they are not in network with [employer].

## 2021-08-06 LAB
GLUCOSE BLD-MCNC: 135 MG/DL (ref 70–108)
GLUCOSE BLD-MCNC: 153 MG/DL (ref 70–108)
GLUCOSE BLD-MCNC: 78 MG/DL (ref 70–108)
GLUCOSE BLD-MCNC: 98 MG/DL (ref 70–108)

## 2021-08-06 PROCEDURE — 6370000000 HC RX 637 (ALT 250 FOR IP): Performed by: NURSE PRACTITIONER

## 2021-08-06 PROCEDURE — 97530 THERAPEUTIC ACTIVITIES: CPT

## 2021-08-06 PROCEDURE — 6360000002 HC RX W HCPCS: Performed by: INTERNAL MEDICINE

## 2021-08-06 PROCEDURE — 2580000003 HC RX 258: Performed by: INTERNAL MEDICINE

## 2021-08-06 PROCEDURE — 1200000000 HC SEMI PRIVATE

## 2021-08-06 PROCEDURE — 6370000000 HC RX 637 (ALT 250 FOR IP): Performed by: INTERNAL MEDICINE

## 2021-08-06 PROCEDURE — 82948 REAGENT STRIP/BLOOD GLUCOSE: CPT

## 2021-08-06 PROCEDURE — 97110 THERAPEUTIC EXERCISES: CPT

## 2021-08-06 PROCEDURE — 99232 SBSQ HOSP IP/OBS MODERATE 35: CPT | Performed by: PHYSICIAN ASSISTANT

## 2021-08-06 RX ADMIN — BUSPIRONE HYDROCHLORIDE 5 MG: 5 TABLET ORAL at 09:23

## 2021-08-06 RX ADMIN — HYDROCODONE BITARTRATE AND ACETAMINOPHEN 1 TABLET: 5; 325 TABLET ORAL at 18:09

## 2021-08-06 RX ADMIN — FUROSEMIDE 20 MG: 40 TABLET ORAL at 09:20

## 2021-08-06 RX ADMIN — INSULIN GLARGINE 30 UNITS: 100 INJECTION, SOLUTION SUBCUTANEOUS at 21:35

## 2021-08-06 RX ADMIN — CYCLOBENZAPRINE 10 MG: 10 TABLET, FILM COATED ORAL at 21:27

## 2021-08-06 RX ADMIN — ISOSORBIDE MONONITRATE 30 MG: 30 TABLET ORAL at 09:25

## 2021-08-06 RX ADMIN — CYCLOBENZAPRINE 10 MG: 10 TABLET, FILM COATED ORAL at 09:24

## 2021-08-06 RX ADMIN — CITALOPRAM 10 MG: 20 TABLET, FILM COATED ORAL at 09:24

## 2021-08-06 RX ADMIN — GABAPENTIN 100 MG: 100 CAPSULE ORAL at 21:27

## 2021-08-06 RX ADMIN — APIXABAN 5 MG: 5 TABLET, FILM COATED ORAL at 09:21

## 2021-08-06 RX ADMIN — SODIUM CHLORIDE, PRESERVATIVE FREE 10 ML: 5 INJECTION INTRAVENOUS at 09:36

## 2021-08-06 RX ADMIN — BUSPIRONE HYDROCHLORIDE 5 MG: 5 TABLET ORAL at 21:27

## 2021-08-06 RX ADMIN — SENNOSIDES 8.6 MG: 8.6 TABLET, COATED ORAL at 21:29

## 2021-08-06 RX ADMIN — APIXABAN 5 MG: 5 TABLET, FILM COATED ORAL at 21:28

## 2021-08-06 RX ADMIN — DOCUSATE SODIUM 100 MG: 100 CAPSULE ORAL at 21:27

## 2021-08-06 RX ADMIN — POTASSIUM CHLORIDE 10 MEQ: 750 TABLET, EXTENDED RELEASE ORAL at 09:20

## 2021-08-06 RX ADMIN — METOPROLOL TARTRATE 75 MG: 25 TABLET, FILM COATED ORAL at 09:21

## 2021-08-06 RX ADMIN — SODIUM CHLORIDE, PRESERVATIVE FREE 10 ML: 5 INJECTION INTRAVENOUS at 21:28

## 2021-08-06 RX ADMIN — RAMIPRIL 20 MG: 10 CAPSULE ORAL at 09:22

## 2021-08-06 RX ADMIN — GABAPENTIN 100 MG: 100 CAPSULE ORAL at 18:10

## 2021-08-06 RX ADMIN — CEFTRIAXONE SODIUM 1000 MG: 1 INJECTION, POWDER, FOR SOLUTION INTRAMUSCULAR; INTRAVENOUS at 21:28

## 2021-08-06 RX ADMIN — AMIODARONE HYDROCHLORIDE 200 MG: 200 TABLET ORAL at 09:23

## 2021-08-06 RX ADMIN — ATORVASTATIN CALCIUM 40 MG: 40 TABLET, FILM COATED ORAL at 21:28

## 2021-08-06 RX ADMIN — HYDROCODONE BITARTRATE AND ACETAMINOPHEN 1 TABLET: 5; 325 TABLET ORAL at 02:29

## 2021-08-06 RX ADMIN — HYDROCODONE BITARTRATE AND ACETAMINOPHEN 1 TABLET: 5; 325 TABLET ORAL at 09:24

## 2021-08-06 RX ADMIN — POLYETHYLENE GLYCOL 3350 17 G: 17 POWDER, FOR SOLUTION ORAL at 09:20

## 2021-08-06 RX ADMIN — Medication 400 MG: at 09:21

## 2021-08-06 RX ADMIN — GABAPENTIN 100 MG: 100 CAPSULE ORAL at 09:21

## 2021-08-06 RX ADMIN — DOCUSATE SODIUM 100 MG: 100 CAPSULE ORAL at 09:20

## 2021-08-06 RX ADMIN — PANTOPRAZOLE SODIUM 40 MG: 40 TABLET, DELAYED RELEASE ORAL at 06:24

## 2021-08-06 RX ADMIN — AMLODIPINE BESYLATE 5 MG: 5 TABLET ORAL at 09:21

## 2021-08-06 RX ADMIN — HYDROCODONE BITARTRATE AND ACETAMINOPHEN 1 TABLET: 5; 325 TABLET ORAL at 23:59

## 2021-08-06 RX ADMIN — METOPROLOL TARTRATE 75 MG: 25 TABLET, FILM COATED ORAL at 21:27

## 2021-08-06 ASSESSMENT — PAIN SCALES - GENERAL
PAINLEVEL_OUTOF10: 0
PAINLEVEL_OUTOF10: 0
PAINLEVEL_OUTOF10: 1
PAINLEVEL_OUTOF10: 9
PAINLEVEL_OUTOF10: 10
PAINLEVEL_OUTOF10: 1
PAINLEVEL_OUTOF10: 4
PAINLEVEL_OUTOF10: 10
PAINLEVEL_OUTOF10: 1
PAINLEVEL_OUTOF10: 10
PAINLEVEL_OUTOF10: 9
PAINLEVEL_OUTOF10: 0

## 2021-08-06 ASSESSMENT — PAIN DESCRIPTION - ORIENTATION: ORIENTATION: RIGHT

## 2021-08-06 ASSESSMENT — PAIN DESCRIPTION - PAIN TYPE: TYPE: CHRONIC PAIN

## 2021-08-06 ASSESSMENT — PAIN - FUNCTIONAL ASSESSMENT: PAIN_FUNCTIONAL_ASSESSMENT: PREVENTS OR INTERFERES SOME ACTIVE ACTIVITIES AND ADLS

## 2021-08-06 ASSESSMENT — PAIN DESCRIPTION - PROGRESSION: CLINICAL_PROGRESSION: NOT CHANGED

## 2021-08-06 ASSESSMENT — PAIN DESCRIPTION - DESCRIPTORS: DESCRIPTORS: ACHING

## 2021-08-06 ASSESSMENT — PAIN DESCRIPTION - FREQUENCY: FREQUENCY: CONTINUOUS

## 2021-08-06 ASSESSMENT — PAIN DESCRIPTION - LOCATION: LOCATION: LEG

## 2021-08-06 ASSESSMENT — PAIN DESCRIPTION - ONSET: ONSET: ON-GOING

## 2021-08-06 NOTE — CARE COORDINATION
8/6/21, 2:16 PM EDT    DISCHARGE PLANNING EVALUATION    Spoke with Iker Meyers at [facility] they have withdrawal patients pre-cert. They are not able to take a COVID positive patient. [address] [place] [address] are both out of network. Left message for patient sister Brennen Olea to return call. 4:19 PM  Spoke with patients daughter Roula Steward, explained the COVID + process with ECF's and [facility] not accepting her at this time. Roula Steward was upset, offered support. Advised on Monday will discuss plan again on Monday.

## 2021-08-06 NOTE — PROGRESS NOTES
verbal cues . Distance: within room   No LOB fair activity tolerance     ADDITIONAL ACTIVITIES:  Pt completed B UE exs with moderate resistance band x 10 reps, x 1 set, while following a handout. tolerance of exs, with  RBs throughout, and visual and verbal cues for tech with resistance band to improve strength and activity tolerance to support ADL components. ASSESSMENT:     Activity Tolerance:  Patient tolerance of  treatment: fair. Discharge Recommendations: [address] , Patient would benefit from continued therapy after discharge   Equipment Recommendations: Other: continue to assess  Plan: Times per week: 5x  Current Treatment Recommendations: Strengthening, Patient/Caregiver Education & Training, Balance Training, Endurance Training, Safety Education & Training, Self-Care / ADL    Patient Education  Patient Education: Home Exercise Program, Importance of Increasing Activity and Assistive Device Safety and safety with functional mobility and transfers. Goals  Short term goals  Time Frame for Short term goals: by discharge  Short term goal 1: Pt to complete LB dressign tasks using LHAE as needed with min A  Short term goal 2: Pt to increase standing tolerance to be able to complete grooming tasks at sink with CGA and no LOB during  Short term goal 3: Pt to complete toileting and transfer iwth CGA and no cues for safety    Following session, patient left in safe position with all fall risk precautions in place.

## 2021-08-06 NOTE — PLAN OF CARE
Problem: Pain:  Goal: Pain level will decrease  Description: Pain level will decrease  8/5/2021 1331 by Diallo Mclean RN  Outcome: Completed     Problem: Falls - Risk of:  Goal: Will remain free from falls  Description: Will remain free from falls  8/5/2021 1331 by Diallo Mclean RN  Outcome: Completed     Problem: Skin Integrity:  Goal: Absence of new skin breakdown  Description: Absence of new skin breakdown  8/5/2021 1331 by Diallo Mlcean RN  Outcome: Completed     Problem: Nutrition  Goal: Optimal nutrition therapy  8/5/2021 1331 by Diallo Mclean RN  Outcome: Completed     Problem: DISCHARGE BARRIERS  Goal: Patient's continuum of care needs are met  8/5/2021 1331 by Diallo Mclean RN  Outcome: Completed     Problem: Serum Glucose Level - Abnormal:  Goal: Ability to maintain appropriate glucose levels will improve  Description: Ability to maintain appropriate glucose levels will improve  8/5/2021 1331 by Diallo Mclean RN  Outcome: Completed     Problem: Infection - Central Venous Catheter-Associated Bloodstream Infection:  Goal: Will show no infection signs and symptoms  Description: Will show no infection signs and symptoms  8/5/2021 1331 by Diallo Mclean RN  Outcome: Completed     Problem: Airway Clearance - Ineffective  Goal: Achieve or maintain patent airway  Outcome: Ongoing     Problem: Gas Exchange - Impaired  Goal: Absence of hypoxia  Outcome: Ongoing  Goal: Promote optimal lung function  Outcome: Ongoing     Problem: Breathing Pattern - Ineffective  Goal: Ability to achieve and maintain a regular respiratory rate  Outcome: Ongoing     Problem:  Body Temperature -  Risk of, Imbalanced  Goal: Ability to maintain a body temperature within defined limits  Outcome: Ongoing  Goal: Will regain or maintain usual level of consciousness  Outcome: Ongoing  Goal: Complications related to the disease process, condition or treatment will be avoided or minimized  Outcome: Ongoing     Problem: Isolation Precautions - Risk of Spread of Infection  Goal: Prevent transmission of infection  Outcome: Ongoing     Problem: Nutrition Deficits  Goal: Optimize nutritional status  Outcome: Ongoing     Problem: Risk for Fluid Volume Deficit  Goal: Maintain normal heart rhythm  Outcome: Ongoing  Goal: Maintain absence of muscle cramping  Outcome: Ongoing  Goal: Maintain normal serum potassium, sodium, calcium, phosphorus, and pH  Outcome: Ongoing     Problem: Loneliness or Risk for Loneliness  Goal: Demonstrate positive use of time alone when socialization is not possible  Outcome: Ongoing     Problem: Fatigue  Goal: Verbalize increase energy and improved vitality  Outcome: Ongoing     Problem: Patient Education: Go to Patient Education Activity  Goal: Patient/Family Education  Outcome: Ongoing

## 2021-08-06 NOTE — PROGRESS NOTES
Hospitalist Progress Note    Patient:  Sanjiv Mckeon      Unit/Bed:7K-05/005-A    YOB: 1940    MRN: [id number]       Acct: [de-identified]     PCP: Faye Leary MD    Date of Admission: 7/24/2021    Assessment/Plan:    1. Incidental positive COVID: Pt needed to be COVID tested prior to being discharged. Pt was COVID positive. Pt is asymptomatic. Will speak with SW tomorrow about DC plan. 2. Physical deconditioning/debility--likely secondary to age and recent surgery; PT/OT, social work is assisting with placement  3. Hypokalemia--resolved with replacement  4. Osteomyelitis L2-L3 status post laminectomy--appreciate neurosurgical input; on Rocephin~infectious disease will not see as pt follows with Dr Jessica Elizabeth; appears patient to be on Rocephin for total of 6 weeks~per review of old records from [facility] the patient appears to have been started on Rocephin on 6/30 for total of 6 weeks and appears to end on August 11  5. Diabetes mellitus type 2, uncontrolled--on Lantus along with low-dose sliding scale, monitor  6. Essential hypertension, uncontrolled--on Norvasc, Lopressor, Lasix Imdur, Altace, monitor  7. Chronic diastolic heart failure--on Lopressor, Lasix  8. VHD with moderate tricuspid regurgitation/mild aortic stenosis  9. Atrial fibrillation--on Eliquis, amiodarone  10. Normocytic anemia  11. Moderate malnutrition--per dietitian    Dispo: Awaiting placement for patient since she is now COVID positive.      Expected discharge date: Pending placement     Disposition:    [] Home       [] TCU       [] Rehab       [] Psych       [x] SNF       [] [place]       [] Other-    Chief Complaint: Inability to care for herself    Hospital Course: Per progress note dated 7/27: Jeanne Beasley a [de-identified] y.o. female who presents to the emergency department for evaluation of bilateral leg weakness.  The patient states after her morning routine yesterday morning she noticed increased weakness when walking and is now unable to walk with her walker. She underwent low back surgery a month ago and states that since the surgery she has been having trouble walking and has had to ambulate using a walker.  She also reports new paresthesias bilaterally in both lower extremities.  She denies chest pain, shortness of breath, diaphoresis, diarrhea, or constipation. The patient has no other acute complaints at this time. MRI lumbar spine 2 weeks agp showed discitis and   osteomyelitis at L2-L3, laminectomy L2-3 with possible abscess posteriorly external to the   spinal canal, no insignificant interval changes noted overall. Dr Carlos Huff and DR Adri Woods   consulted who both agree patient does not need any surgical intervention at this time. Patient was to discharge previousy on 6 wks of antiobiotic however this was not done at   [facility]. Patient has PICC. Patient is feeling much improved, labs stable, afebrile. Patient   working with therapy. Dr Adri Woods wrote for Rx for Ceftriaxone and this will be continued for   6 wks. Patient stable for d/c to SNF,\"    7/28--> hemodynamically stable, laboratory studies are stable except hemoglobin at 9.4 and this morning her glucose was 67    7/29--> infectious disease will not see patient as the patient follows with another ID doctor at [facility] so attempting to obtain medical records; hemodynamically stable, glucose at 97 this morning    7/30--> old records from [facility] reviewed and timeframe was established for Rocephin; hemodynamically stable; awaiting plan from  regarding placement    7/31--> hemodynamically stable, sugars past 24 hours ranged from 120-271    8/1--> hemodynamically stable, sugars past 24 hours have ranged     8/2-->did not participate with OT yesterday; hemodynamically stable; awaiting ECF pre-CERT    3/2--> No acute events overnight. Pt tested positive for COVID. Unable to be DC'ed. Pt has no pain or issues.  Pt will not be transferred units due to being asymptomatic. Subjective (past 24 hours):   No acute events overnight. Patient's Covid status is making placement difficult. Social work spoke with multiple ECF's and they are either out-of-network or not taking Covid positive patients. Family is upset about patient not being able to leave.     Medications:  Reviewed    Infusion Medications    dextrose      sodium chloride       Scheduled Medications    miconazole   Topical BID    senna  1 tablet Oral Nightly    polyethylene glycol  17 g Oral Daily    pantoprazole  40 mg Oral QAM AC    insulin lispro  0-6 Units Subcutaneous TID WC    insulin lispro  0-3 Units Subcutaneous Nightly    busPIRone  5 mg Oral BID    citalopram  10 mg Oral Daily    docusate sodium  100 mg Oral BID    gabapentin  100 mg Oral TID    amiodarone  200 mg Oral Daily    amLODIPine  5 mg Oral Daily    apixaban  5 mg Oral BID    atorvastatin  40 mg Oral Nightly    cyclobenzaprine  10 mg Oral BID    isosorbide mononitrate  30 mg Oral Daily    [Held by provider] labetalol  100 mg Oral BID    insulin glargine  30 Units Subcutaneous Nightly    magnesium oxide  400 mg Oral Daily    metoprolol tartrate  75 mg Oral BID    potassium chloride  10 mEq Oral Q MWF    ramipril  20 mg Oral Daily    sodium chloride flush  10 mL Intravenous 2 times per day    cefTRIAXone (ROCEPHIN) IV  1,000 mg Intravenous Q24H    furosemide  20 mg Oral Daily     PRN Meds: glucose, dextrose, glucagon (rDNA), dextrose, sodium chloride flush, sodium chloride, ondansetron **OR** ondansetron, polyethylene glycol, acetaminophen **OR** acetaminophen, potassium chloride **OR** potassium alternative oral replacement **OR** potassium chloride, magnesium sulfate, HYDROcodone 5 mg - acetaminophen      Intake/Output Summary (Last 24 hours) at 8/6/2021 1554  Last data filed at 8/6/2021 1346  Gross per 24 hour   Intake 802.65 ml   Output --   Net 802.65 ml       Diet:  Adult Oral Nutrition Supplement; Diabetic Oral Supplement  Adult Oral Nutrition Supplement; Other Oral Supplement; activa tid with meals  ADULT DIET; Regular; 4 carb choices (60 gm/meal); Low Sodium (2 gm)    Exam:  BP (!) 150/69   Pulse 64   Temp 97.6 °F (36.4 °C) (Oral)   Resp 16   Ht 5' 4\" (1.626 m)   Wt 175 lb 14.8 oz (79.8 kg)   SpO2 96%   BMI 30.20 kg/m²     General appearance: No apparent distress, appears stated age and cooperative. HEENT: Pupils equal, round, and reactive to light. Conjunctivae/corneas clear. Neck: Supple, with full range of motion. No jugular venous distention. Trachea midline. Respiratory:  Normal respiratory effort. Clear to auscultation, bilaterally without Rales/Wheezes/Rhonchi. Cardiovascular: Regular rate and rhythm with normal S1/S2; (+) murmur noted. Abdomen: Soft, non-tender, non-distended with normal bowel sounds. Musculoskeletal: passive and active ROM x 4 extremities. Skin: Skin color, texture, turgor normal.    Neurologic:  Neurovascularly intact without any focal sensory/motor deficits. Cranial nerves: II-XII intact, grossly non-focal.  Psychiatric: Alert and oriented, thought content appropriate  Capillary Refill: Brisk,< 3 seconds   Peripheral Pulses: +2 palpable, equal bilaterally       Labs:   No results for input(s): WBC, HGB, HCT, PLT in the last 72 hours. No results for input(s): NA, K, CL, CO2, BUN, CREATININE, CALCIUM, PHOS in the last 72 hours. Invalid input(s): [address]  Microbiology:    None    Radiology:  MRI LUMBAR SPINE W WO CONTRAST    Result Date: 7/25/2021  PROCEDURE: MRI LUMBAR SPINE W WO CONTRAST CLINICAL INFORMATION: Lumbar diskitis; LE weakness. COMPARISON: Report of previous  MRI scan dated 6/29/2021. . TECHNIQUE: Sagittal and axial T1 and T2-weighted images were obtained to the lumbar spine. Postcontrast axial and sagittal T1-weighted images were also obtained.  FINDINGS: There is abnormal signal intensity in approximately the L1 vertebral body,, superior aspect of the L2 vertebral body, T12-L1 and L1-2 disc spaces consistent with vertebral body osteomyelitis and disc space infection. There is possible previous instrumentation at L1-2. There is abnormal signal intensity enhancement the soft tissues dorsally at L1 and L2. The lumbar vertebral bodies are normally aligned. There is normal marrow signal throughout. There is no bone marrow edema. There are no compression fractures. No pars defects are noted. The discs have normal signal throughout. The visualized aspects of the distal spinal cord are normal. The nerve roots of the cauda equina and the tip of the conus are normal. There are no gross abnormalities in the distal thoracic spine. On the axial images, at T12-L1, there is mild canal and moderate bilateral from stenosis At L1-L2, there is mild canal and moderate to severe bilateral foraminal stenosis. At L2-3, there is mild to moderate canal and moderate bilateral foraminal stenosis. At L3-4, there is moderate canal and moderate to severe bilateral foraminal stenosis At L4-5, there is mild-to-moderate canal and moderate bilateral foraminal stenosis At L5-S1, there is mild canal and moderate bilateral foraminal stenosis. There is degenerative change involving the sacroiliac joints bilaterally. There is no abnormal enhancement. There is atrophy involving the iliacus, psoas and paraspinal muscles bilaterally. There is a left hip replacement. 1. Previous instrumentation at L1-2. 2. Abnormal signal intensity within the L1 vertebral body and superior aspect of the L2 vertebral body consistent with osteomyelitis. 3. Abnormal signal intensity within the T12-L1 and L1-2 disc spaces, consistent with disc space infection. 4. Abnormal signal intensity and enhancement in the soft tissues dorsally at L1 and L2 consistent with postoperative and possible inflammatory changes.  5. There is mild-to-moderate canal and moderate bilateral from stenosis at L2-3 and L4-5. 6. There is moderate canal and moderate to severe bilateral foraminal stenosis at L3-4. 7. There is mild canal and moderate severe bilateral foraminal stenosis at L1-2. 8. There is mild canal and moderate bilateral foraminal stenosis at T12-L1 and L5-S1. 9. There is degenerative change involving the sacroiliac joints bilaterally. 10. There is atrophy involving the because, psoas and paraspinal muscles bilaterally. 11. There is a left hip replacement. **This report has been created using voice recognition software. It may contain minor errors which are inherent in voice recognition technology. ** Final report electronically signed by DR Ainsley Mcdermott on 7/25/2021 1:34 PM    XR CHEST 1 VIEW    Result Date: 7/25/2021  PROCEDURE: XR CHEST 1 VIEW CLINICAL INFORMATION: picc line placement COMPARISON: No prior study. TECHNIQUE:  AP chest single view  FINDINGS: There is a right upper extremity PICC present with the tip overlying the cavoatrial junction. The heart size is normal. No focal consolidation, pleural effusion or pneumothorax is seen. Left shoulder joint arthrosis is noted. 1. Right upper extremity PICC tip projects over the cavoatrial junction. **This report has been created using voice recognition software. It may contain minor errors which are inherent in voice recognition technology. ** Final report electronically signed by Dr. Kash Jenkins on 7/25/2021 10:30 AM      DVT prophylaxis: [] Lovenox                                 [] SCDs                                 [] SQ Heparin                                 [] Encourage ambulation           [x] Already on Anticoagulation~Eliquis     Code Status: DNR-CCA    Tele:   [] yes             [x] no    Active Hospital Problems    Diagnosis Date Noted    Moderate malnutrition ([address] [E44.0] 07/28/2021     Class: Acute    Weakness [R53.1] 07/26/2021    UTI (urinary tract infection) [N39.0] 07/24/2021       Electronically signed by PRASHANT Salcedo on 8/6/2021 at 3:54 PM

## 2021-08-06 NOTE — CARE COORDINATION
8/6/21, 10:25 AM EDT    Alvin day: 11  Location: [facility]05/005- Reason for admit: UTI (urinary tract infection) [N39.0]  Weakness [R53.1]   Procedure: Has PICC line  Barriers to Discharge: Admitted per hospitalist with UTI. ID management. IV antibiotics. Neurosurgery saw for back pain. PT/OT. N/V checks. Pain management. Daily weights. I&O. Tested Covid positive yesterday. Droplet plus isolation  PCP: Chaim Gardner MD  Readmission Risk Score: 17%  Patient Goals/Plan/Treatment Preferences: SW to work on ECF placement - facility in network which will accept Covid positive.

## 2021-08-07 LAB
GLUCOSE BLD-MCNC: 117 MG/DL (ref 70–108)
GLUCOSE BLD-MCNC: 130 MG/DL (ref 70–108)
GLUCOSE BLD-MCNC: 221 MG/DL (ref 70–108)
GLUCOSE BLD-MCNC: 70 MG/DL (ref 70–108)

## 2021-08-07 PROCEDURE — 2580000003 HC RX 258: Performed by: INTERNAL MEDICINE

## 2021-08-07 PROCEDURE — 6360000002 HC RX W HCPCS: Performed by: INTERNAL MEDICINE

## 2021-08-07 PROCEDURE — 82948 REAGENT STRIP/BLOOD GLUCOSE: CPT

## 2021-08-07 PROCEDURE — 6370000000 HC RX 637 (ALT 250 FOR IP): Performed by: INTERNAL MEDICINE

## 2021-08-07 PROCEDURE — 1200000000 HC SEMI PRIVATE

## 2021-08-07 PROCEDURE — 99232 SBSQ HOSP IP/OBS MODERATE 35: CPT | Performed by: PHYSICIAN ASSISTANT

## 2021-08-07 PROCEDURE — 97116 GAIT TRAINING THERAPY: CPT

## 2021-08-07 PROCEDURE — 6370000000 HC RX 637 (ALT 250 FOR IP): Performed by: NURSE PRACTITIONER

## 2021-08-07 PROCEDURE — 97110 THERAPEUTIC EXERCISES: CPT

## 2021-08-07 RX ADMIN — HYDROCODONE BITARTRATE AND ACETAMINOPHEN 1 TABLET: 5; 325 TABLET ORAL at 15:06

## 2021-08-07 RX ADMIN — INSULIN GLARGINE 30 UNITS: 100 INJECTION, SOLUTION SUBCUTANEOUS at 21:44

## 2021-08-07 RX ADMIN — CYCLOBENZAPRINE 10 MG: 10 TABLET, FILM COATED ORAL at 08:31

## 2021-08-07 RX ADMIN — PANTOPRAZOLE SODIUM 40 MG: 40 TABLET, DELAYED RELEASE ORAL at 06:10

## 2021-08-07 RX ADMIN — BUSPIRONE HYDROCHLORIDE 5 MG: 5 TABLET ORAL at 20:22

## 2021-08-07 RX ADMIN — ISOSORBIDE MONONITRATE 30 MG: 30 TABLET ORAL at 08:32

## 2021-08-07 RX ADMIN — APIXABAN 5 MG: 5 TABLET, FILM COATED ORAL at 20:22

## 2021-08-07 RX ADMIN — Medication 400 MG: at 08:32

## 2021-08-07 RX ADMIN — SENNOSIDES 8.6 MG: 8.6 TABLET, COATED ORAL at 20:22

## 2021-08-07 RX ADMIN — GABAPENTIN 100 MG: 100 CAPSULE ORAL at 20:22

## 2021-08-07 RX ADMIN — ATORVASTATIN CALCIUM 40 MG: 40 TABLET, FILM COATED ORAL at 20:22

## 2021-08-07 RX ADMIN — RAMIPRIL 20 MG: 10 CAPSULE ORAL at 08:32

## 2021-08-07 RX ADMIN — HYDROCODONE BITARTRATE AND ACETAMINOPHEN 1 TABLET: 5; 325 TABLET ORAL at 08:31

## 2021-08-07 RX ADMIN — GABAPENTIN 100 MG: 100 CAPSULE ORAL at 08:32

## 2021-08-07 RX ADMIN — METOPROLOL TARTRATE 75 MG: 25 TABLET, FILM COATED ORAL at 08:31

## 2021-08-07 RX ADMIN — BUSPIRONE HYDROCHLORIDE 5 MG: 5 TABLET ORAL at 08:32

## 2021-08-07 RX ADMIN — SODIUM CHLORIDE, PRESERVATIVE FREE 10 ML: 5 INJECTION INTRAVENOUS at 08:38

## 2021-08-07 RX ADMIN — GABAPENTIN 100 MG: 100 CAPSULE ORAL at 15:06

## 2021-08-07 RX ADMIN — POLYETHYLENE GLYCOL 3350 17 G: 17 POWDER, FOR SOLUTION ORAL at 08:31

## 2021-08-07 RX ADMIN — AMLODIPINE BESYLATE 5 MG: 5 TABLET ORAL at 08:32

## 2021-08-07 RX ADMIN — CITALOPRAM 10 MG: 20 TABLET, FILM COATED ORAL at 08:32

## 2021-08-07 RX ADMIN — AMIODARONE HYDROCHLORIDE 200 MG: 200 TABLET ORAL at 08:32

## 2021-08-07 RX ADMIN — SODIUM CHLORIDE, PRESERVATIVE FREE 10 ML: 5 INJECTION INTRAVENOUS at 20:22

## 2021-08-07 RX ADMIN — APIXABAN 5 MG: 5 TABLET, FILM COATED ORAL at 08:32

## 2021-08-07 RX ADMIN — CEFTRIAXONE SODIUM 1000 MG: 1 INJECTION, POWDER, FOR SOLUTION INTRAMUSCULAR; INTRAVENOUS at 18:55

## 2021-08-07 RX ADMIN — MICONAZOLE NITRATE: 20 POWDER TOPICAL at 08:35

## 2021-08-07 RX ADMIN — DOCUSATE SODIUM 100 MG: 100 CAPSULE ORAL at 08:31

## 2021-08-07 RX ADMIN — CYCLOBENZAPRINE 10 MG: 10 TABLET, FILM COATED ORAL at 20:22

## 2021-08-07 RX ADMIN — FUROSEMIDE 20 MG: 40 TABLET ORAL at 08:32

## 2021-08-07 RX ADMIN — DOCUSATE SODIUM 100 MG: 100 CAPSULE ORAL at 20:22

## 2021-08-07 RX ADMIN — SODIUM CHLORIDE, PRESERVATIVE FREE 10 ML: 5 INJECTION INTRAVENOUS at 08:32

## 2021-08-07 RX ADMIN — MICONAZOLE NITRATE: 20 POWDER TOPICAL at 20:23

## 2021-08-07 RX ADMIN — HYDROCODONE BITARTRATE AND ACETAMINOPHEN 1 TABLET: 5; 325 TABLET ORAL at 20:22

## 2021-08-07 ASSESSMENT — PAIN SCALES - GENERAL
PAINLEVEL_OUTOF10: 6
PAINLEVEL_OUTOF10: 6
PAINLEVEL_OUTOF10: 4
PAINLEVEL_OUTOF10: 0
PAINLEVEL_OUTOF10: 0
PAINLEVEL_OUTOF10: 7
PAINLEVEL_OUTOF10: 6
PAINLEVEL_OUTOF10: 4

## 2021-08-07 NOTE — PROGRESS NOTES
[address] [address]  INPATIENT PHYSICAL THERAPY  DAILY NOTE  [facility] ORTHOPEDICS 7K - 7K-05/005-A      Time In: 6015  Time Out: 1450  Timed Code Treatment Minutes: 25 Minutes  Minutes: 25          Date: 2021  Patient Name: Rosette Brown,  Gender:  female        MRN: [id number]  : 1940  ([de-identified] y.o.)     Referring Practitioner: Gabriel Dial MD  Diagnosis: UTI  Additional Pertinent Hx: Per HPI: Yasmeen Morin is a [de-identified] y.o. female who presents to the emergency department for evaluation of bilateral leg weakness. The patient states after her morning routine yesterday morning she noticed increased weakness when walking and is now unable to walk with her walker. She underwent low back surgery a month ago and states that since the surgery she has been having trouble walking and has had to ambulate using a walker. She also reports new paresthesias bilaterally in both lower extremities. She denies chest pain, shortness of breath, diaphoresis, diarrhea, or constipation. \"     Prior Level of Function:  Lives With: Daughter (daughter lives with pt)  Type of Home: Mobile home  Home Layout: One level  Home Access: Stairs to enter with rails (platform steps wher she can put her walker up on)  Entrance Stairs - Number of Steps: 4  Home Equipment: Rolling walker, 4 wheeled walker, Cane, [id number] [facility] Shower/Tub: Walk-in shower, Shower chair with back  Bathroom Toilet: Handicap height    ADL Assistance: Independent  Ambulation Assistance: Needs assistance (pt states on her last day at [facility] she was walking independently with walker, but that was the first time, and pt got much weaker following that)  Transfer Assistance: Independent  Active : No  Additional Comments: pt states she left the SNF [facility]) last Friday and felt she was not ready to go home yet. Pt stating when she left SNF, she was able to complete her ADL tasks indep.  Pt stating she was a \"nervous wreck\" going home d/t not feeling ready and the need to change her PICC line dressing everyday. Pt was suppose to have [facility] therapy but never had a chance to initiate d/t being home less than 24 hours. Restrictions/Precautions:  Restrictions/Precautions: Isolation  Position Activity Restriction  Spinal Precautions: No Bending, No Lifting, No Twisting  Other position/activity restrictions: hx lumbar laminectomy L2-L3 and osteomyelitis tx with abx, covid 19+ on 8/5/21       SUBJECTIVE: pt in bed on arrival and agreeable for therapy she wanted to use bathroom during session     PAIN: 0/10 pt reported even with activity she denied pain this date     Vitals: Blood Pressure: 138/72    OBJECTIVE:  Bed Mobility:  Supine to Sit: CGA slow and guarded, with use of rail and HOB elevated   Sit to Supine: Moderate Assistance, at diego LEs      Transfers:  Sit to Stand: Contact Guard Assistance  Stand to [address] for hand placement   Ambulation:  Contact Guard Assistance, to min assist   Distance: 15x1, 30x1  Surface: Level Tile  Device:Rolling Walker  Gait Deviations:  Slow gayatri, noted lacking TKE at left LE and decreased heel strike at left LE with short step length, also noted heavy reliance on walker- pt had 2 near loss of balance towards the end of walk     Balance:  Pt unsteady with initial standing balance with min assist then progressed to CGA - standing balance to complete toileting task and she did release diego UEs at support with CGA she did tend to lean on counter top     Exercise:  Patient was guided in 2 set(s) 10 reps of exercise to both lower extremities. Ankle pumps, Seated marches, Seated hamstring curls, Seated heel/toe raises, Long arc quads and Seated abduction/adduction. Exercises were completed for increased independence with functional mobility.     Functional Outcome Measures: Completed  AM-PAC Inpatient Mobility Raw Score : 16  AM-PAC Inpatient T-Scale Score : 40.78    ASSESSMENT:  Assessment: Patient progressing toward established goals. and however she cont to demonstrate generalized weakness in LEs and was unsteady with standing balance, pt would greatly benefit from cont skilled therapy prior to return home  Activity Tolerance:  Patient tolerance of  treatment: fair. Equipment Recommendations:Equipment Needed: No  Discharge Recommendations:    Subacute/Skilled Nursing Facility    Plan: Times per week: 3-5x O  Times per day: Daily  Current Treatment Recommendations: Strengthening, Neuromuscular Re-education, Home Exercise Program, Safety Education & Training, Balance Training, Endurance Training, Patient/Caregiver Education & Training, Functional Mobility Training, Transfer Training, Gait Training, Stair training    Patient Education  Patient Education: Plan of Care, encouraged use of gait belt     Goals:  Patient goals : go somewhere for therapy, find out what is causing leg weakness  Short term goals  Time Frame for Short term goals: by hospital discharge  Short term goal 1: Supine to/from sit, using log roll, with modified independence for ease of transfers at discharge site. Short term goal 2: Sit to/from stand with modified independence for ease of transfers at discharge site. Short term goal 3: Improve 5x STS time to less than 14.8 seconds to reflect age appropriate norms, indicating improved functional strength and endurance. Short term goal 4: Ambulate 48' with RW and modified independence for home distance ambulation. Short term goal 5: Ascend/descend 4 steps with BHR with modified independence for entry into home. Long term goals  Time Frame for Long term goals : N/A due to short ELOS. Following session, patient left in safe position with all fall risk precautions in place.

## 2021-08-07 NOTE — PROGRESS NOTES
Hospitalist Progress Note    Patient:  Lanny Morgan      Unit/Bed:7K-05/005-A    YOB: 1940    MRN: [id number]       Acct: [de-identified]     PCP: Shobha Khan MD    Date of Admission: 7/24/2021    Assessment/Plan:    1. Incidental positive COVID: Pt needed to be COVID tested prior to being discharged. Pt was COVID positive. Pt is asymptomatic. Will speak with SW on Monday about DC plan. 2. Physical deconditioning/debility--likely secondary to age and recent surgery; PT/OT, social work is assisting with placement  3. Hypokalemia--resolved with replacement  4. Osteomyelitis L2-L3 status post laminectomy--appreciate neurosurgical input; on Rocephin~infectious disease will not see as pt follows with Dr Jerald Chang; appears patient to be on Rocephin for total of 6 weeks~per review of old records from [facility] the patient appears to have been started on Rocephin on 6/30 for total of 6 weeks and appears to end on August 11  5. Diabetes mellitus type 2, uncontrolled--on Lantus along with low-dose sliding scale, monitor  6. Essential hypertension, uncontrolled--on Norvasc, Lopressor, Lasix Imdur, Altace, monitor  7. Chronic diastolic heart failure--on Lopressor, Lasix  8. VHD with moderate tricuspid regurgitation/mild aortic stenosis  9. Atrial fibrillation--on Eliquis, amiodarone  10. Normocytic anemia  11. Moderate malnutrition--per dietitian    Dispo: Awaiting placement for patient since she is now COVID positive.      Expected discharge date: Pending placement     Disposition:    [] Home       [] TCU       [] Rehab       [] Psych       [x] SNF       [] [place]       [] Other-    Chief Complaint: Inability to care for herself    Hospital Course: Per progress note dated 7/27: Kristofer Kinney a [de-identified] y.o. female who presents to the emergency department for evaluation of bilateral leg weakness.  The patient states after her morning routine yesterday morning she noticed increased weakness when walking and is now unable to walk with her walker. She underwent low back surgery a month ago and states that since the surgery she has been having trouble walking and has had to ambulate using a walker.  She also reports new paresthesias bilaterally in both lower extremities.  She denies chest pain, shortness of breath, diaphoresis, diarrhea, or constipation. The patient has no other acute complaints at this time. MRI lumbar spine 2 weeks agp showed discitis and   osteomyelitis at L2-L3, laminectomy L2-3 with possible abscess posteriorly external to the   spinal canal, no insignificant interval changes noted overall. Dr Dae Chacon and DR Jose Hager   consulted who both agree patient does not need any surgical intervention at this time. Patient was to discharge previousy on 6 wks of antiobiotic however this was not done at   [facility]. Patient has PICC. Patient is feeling much improved, labs stable, afebrile. Patient   working with therapy. Dr Jose Hager wrote for Rx for Ceftriaxone and this will be continued for   6 wks. Patient stable for d/c to SNF,\"    7/28--> hemodynamically stable, laboratory studies are stable except hemoglobin at 9.4 and this morning her glucose was 67    7/29--> infectious disease will not see patient as the patient follows with another ID doctor at [facility] so attempting to obtain medical records; hemodynamically stable, glucose at 97 this morning    7/30--> old records from [facility] reviewed and timeframe was established for Rocephin; hemodynamically stable; awaiting plan from  regarding placement    7/31--> hemodynamically stable, sugars past 24 hours ranged from 120-271    8/1--> hemodynamically stable, sugars past 24 hours have ranged     8/2-->did not participate with OT yesterday; hemodynamically stable; awaiting ECF pre-CERT    Subjective (past 24 hours): No acute events overnight. Pt tested positive for COVID. Unable to be DC'ed.   Pt will not be transferred units due to being asymptomatic. Pt and daughter upset that she is still in the hospital. This provider explained to the patient that it is NH policy and we cannot force them to accept the patient. Medications:  Reviewed    Infusion Medications    dextrose      sodium chloride       Scheduled Medications    miconazole   Topical BID    senna  1 tablet Oral Nightly    polyethylene glycol  17 g Oral Daily    pantoprazole  40 mg Oral QAM AC    insulin lispro  0-6 Units Subcutaneous TID WC    insulin lispro  0-3 Units Subcutaneous Nightly    busPIRone  5 mg Oral BID    citalopram  10 mg Oral Daily    docusate sodium  100 mg Oral BID    gabapentin  100 mg Oral TID    amiodarone  200 mg Oral Daily    amLODIPine  5 mg Oral Daily    apixaban  5 mg Oral BID    atorvastatin  40 mg Oral Nightly    cyclobenzaprine  10 mg Oral BID    isosorbide mononitrate  30 mg Oral Daily    [Held by provider] labetalol  100 mg Oral BID    insulin glargine  30 Units Subcutaneous Nightly    magnesium oxide  400 mg Oral Daily    metoprolol tartrate  75 mg Oral BID    potassium chloride  10 mEq Oral Q MWF    ramipril  20 mg Oral Daily    sodium chloride flush  10 mL Intravenous 2 times per day    cefTRIAXone (ROCEPHIN) IV  1,000 mg Intravenous Q24H    furosemide  20 mg Oral Daily     PRN Meds: glucose, dextrose, glucagon (rDNA), dextrose, sodium chloride flush, sodium chloride, ondansetron **OR** ondansetron, polyethylene glycol, acetaminophen **OR** acetaminophen, potassium chloride **OR** potassium alternative oral replacement **OR** potassium chloride, magnesium sulfate, HYDROcodone 5 mg - acetaminophen      Intake/Output Summary (Last 24 hours) at 8/7/2021 1342  Last data filed at 8/7/2021 0908  Gross per 24 hour   Intake 1000 ml   Output --   Net 1000 ml       Diet:  Adult Oral Nutrition Supplement; Diabetic Oral Supplement  Adult Oral Nutrition Supplement;  Other Oral Supplement; Celsa Trinh tid with meals  ADULT DIET; Regular; 4 carb choices (60 gm/meal); Low Sodium (2 gm)    Exam:  /63   Pulse 66   Temp 97.8 °F (36.6 °C) (Oral)   Resp 16   Ht 5' 4\" (1.626 m)   Wt 175 lb 14.8 oz (79.8 kg)   SpO2 98%   BMI 30.20 kg/m²     General appearance: No apparent distress, appears stated age and cooperative. HEENT: Pupils equal, round, and reactive to light. Conjunctivae/corneas clear. Neck: Supple, with full range of motion. No jugular venous distention. Trachea midline. Respiratory:  Normal respiratory effort. Clear to auscultation, bilaterally without Rales/Wheezes/Rhonchi. Cardiovascular: Regular rate and rhythm with normal S1/S2; (+) murmur noted. Abdomen: Soft, non-tender, non-distended with normal bowel sounds. Musculoskeletal: passive and active ROM x 4 extremities. Skin: Skin color, texture, turgor normal.    Neurologic:  Neurovascularly intact without any focal sensory/motor deficits. Cranial nerves: II-XII intact, grossly non-focal.  Psychiatric: Alert and oriented, thought content appropriate  Capillary Refill: Brisk,< 3 seconds   Peripheral Pulses: +2 palpable, equal bilaterally       Labs:   No results for input(s): WBC, HGB, HCT, PLT in the last 72 hours. No results for input(s): NA, K, CL, CO2, BUN, CREATININE, CALCIUM, PHOS in the last 72 hours. Invalid input(s): [address]  Microbiology:    None    Radiology:  MRI LUMBAR SPINE W WO CONTRAST    Result Date: 7/25/2021  PROCEDURE: MRI LUMBAR SPINE W WO CONTRAST CLINICAL INFORMATION: Lumbar diskitis; LE weakness. COMPARISON: Report of previous  MRI scan dated 6/29/2021. . TECHNIQUE: Sagittal and axial T1 and T2-weighted images were obtained to the lumbar spine. Postcontrast axial and sagittal T1-weighted images were also obtained.  FINDINGS: There is abnormal signal intensity in approximately the L1 vertebral body,, superior aspect of the L2 vertebral body, T12-L1 and L1-2 disc spaces consistent with vertebral body osteomyelitis and disc space infection. There is possible previous instrumentation at L1-2. There is abnormal signal intensity enhancement the soft tissues dorsally at L1 and L2. The lumbar vertebral bodies are normally aligned. There is normal marrow signal throughout. There is no bone marrow edema. There are no compression fractures. No pars defects are noted. The discs have normal signal throughout. The visualized aspects of the distal spinal cord are normal. The nerve roots of the cauda equina and the tip of the conus are normal. There are no gross abnormalities in the distal thoracic spine. On the axial images, at T12-L1, there is mild canal and moderate bilateral from stenosis At L1-L2, there is mild canal and moderate to severe bilateral foraminal stenosis. At L2-3, there is mild to moderate canal and moderate bilateral foraminal stenosis. At L3-4, there is moderate canal and moderate to severe bilateral foraminal stenosis At L4-5, there is mild-to-moderate canal and moderate bilateral foraminal stenosis At L5-S1, there is mild canal and moderate bilateral foraminal stenosis. There is degenerative change involving the sacroiliac joints bilaterally. There is no abnormal enhancement. There is atrophy involving the iliacus, psoas and paraspinal muscles bilaterally. There is a left hip replacement. 1. Previous instrumentation at L1-2. 2. Abnormal signal intensity within the L1 vertebral body and superior aspect of the L2 vertebral body consistent with osteomyelitis. 3. Abnormal signal intensity within the T12-L1 and L1-2 disc spaces, consistent with disc space infection. 4. Abnormal signal intensity and enhancement in the soft tissues dorsally at L1 and L2 consistent with postoperative and possible inflammatory changes.  5. There is mild-to-moderate canal and moderate bilateral from stenosis at L2-3 and L4-5. 6. There is moderate canal and moderate to severe bilateral foraminal stenosis at L3-4. 7. There is mild canal and moderate severe bilateral foraminal stenosis at L1-2. 8. There is mild canal and moderate bilateral foraminal stenosis at T12-L1 and L5-S1. 9. There is degenerative change involving the sacroiliac joints bilaterally. 10. There is atrophy involving the because, psoas and paraspinal muscles bilaterally. 11. There is a left hip replacement. **This report has been created using voice recognition software. It may contain minor errors which are inherent in voice recognition technology. ** Final report electronically signed by DR Ainsley Mcdermott on 7/25/2021 1:34 PM    XR CHEST 1 VIEW    Result Date: 7/25/2021  PROCEDURE: XR CHEST 1 VIEW CLINICAL INFORMATION: picc line placement COMPARISON: No prior study. TECHNIQUE:  AP chest single view  FINDINGS: There is a right upper extremity PICC present with the tip overlying the cavoatrial junction. The heart size is normal. No focal consolidation, pleural effusion or pneumothorax is seen. Left shoulder joint arthrosis is noted. 1. Right upper extremity PICC tip projects over the cavoatrial junction. **This report has been created using voice recognition software. It may contain minor errors which are inherent in voice recognition technology. ** Final report electronically signed by Dr. Kash Jenkins on 7/25/2021 10:30 AM      DVT prophylaxis: [] Lovenox                                 [] SCDs                                 [] SQ Heparin                                 [] Encourage ambulation           [x] Already on Anticoagulation~Eliquis     Code Status: DNR-CCA    Tele:   [] yes             [x] no    Active Hospital Problems    Diagnosis Date Noted    Moderate malnutrition ([address] [E44.0] 07/28/2021     Class: Acute    Weakness [R53.1] 07/26/2021    UTI (urinary tract infection) [N39.0] 07/24/2021       Electronically signed by PRASHANT Salcedo on 8/7/2021 at 1:42 PM

## 2021-08-08 LAB
GLUCOSE BLD-MCNC: 110 MG/DL (ref 70–108)
GLUCOSE BLD-MCNC: 112 MG/DL (ref 70–108)
GLUCOSE BLD-MCNC: 223 MG/DL (ref 70–108)
GLUCOSE BLD-MCNC: 79 MG/DL (ref 70–108)

## 2021-08-08 PROCEDURE — 6370000000 HC RX 637 (ALT 250 FOR IP): Performed by: PHYSICIAN ASSISTANT

## 2021-08-08 PROCEDURE — 2580000003 HC RX 258: Performed by: INTERNAL MEDICINE

## 2021-08-08 PROCEDURE — 82948 REAGENT STRIP/BLOOD GLUCOSE: CPT

## 2021-08-08 PROCEDURE — 1200000000 HC SEMI PRIVATE

## 2021-08-08 PROCEDURE — 6360000002 HC RX W HCPCS: Performed by: INTERNAL MEDICINE

## 2021-08-08 PROCEDURE — 6370000000 HC RX 637 (ALT 250 FOR IP): Performed by: NURSE PRACTITIONER

## 2021-08-08 PROCEDURE — 6370000000 HC RX 637 (ALT 250 FOR IP): Performed by: INTERNAL MEDICINE

## 2021-08-08 PROCEDURE — 99232 SBSQ HOSP IP/OBS MODERATE 35: CPT | Performed by: PHYSICIAN ASSISTANT

## 2021-08-08 RX ORDER — BISACODYL 10 MG
10 SUPPOSITORY, RECTAL RECTAL
Status: DISCONTINUED | OUTPATIENT
Start: 2021-08-08 | End: 2021-08-11 | Stop reason: HOSPADM

## 2021-08-08 RX ADMIN — Medication 400 MG: at 08:42

## 2021-08-08 RX ADMIN — AMIODARONE HYDROCHLORIDE 200 MG: 200 TABLET ORAL at 08:43

## 2021-08-08 RX ADMIN — HYDROCODONE BITARTRATE AND ACETAMINOPHEN 1 TABLET: 5; 325 TABLET ORAL at 15:48

## 2021-08-08 RX ADMIN — CYCLOBENZAPRINE 10 MG: 10 TABLET, FILM COATED ORAL at 21:55

## 2021-08-08 RX ADMIN — GABAPENTIN 100 MG: 100 CAPSULE ORAL at 09:00

## 2021-08-08 RX ADMIN — PANTOPRAZOLE SODIUM 40 MG: 40 TABLET, DELAYED RELEASE ORAL at 06:23

## 2021-08-08 RX ADMIN — FUROSEMIDE 20 MG: 40 TABLET ORAL at 08:42

## 2021-08-08 RX ADMIN — CYCLOBENZAPRINE 10 MG: 10 TABLET, FILM COATED ORAL at 08:42

## 2021-08-08 RX ADMIN — AMLODIPINE BESYLATE 5 MG: 5 TABLET ORAL at 08:43

## 2021-08-08 RX ADMIN — MICONAZOLE NITRATE: 20 POWDER TOPICAL at 10:42

## 2021-08-08 RX ADMIN — SENNOSIDES 8.6 MG: 8.6 TABLET, COATED ORAL at 21:55

## 2021-08-08 RX ADMIN — APIXABAN 5 MG: 5 TABLET, FILM COATED ORAL at 08:42

## 2021-08-08 RX ADMIN — ISOSORBIDE MONONITRATE 30 MG: 30 TABLET ORAL at 08:42

## 2021-08-08 RX ADMIN — DOCUSATE SODIUM 100 MG: 100 CAPSULE ORAL at 08:42

## 2021-08-08 RX ADMIN — DOCUSATE SODIUM 100 MG: 100 CAPSULE ORAL at 21:55

## 2021-08-08 RX ADMIN — HYDROCODONE BITARTRATE AND ACETAMINOPHEN 1 TABLET: 5; 325 TABLET ORAL at 08:42

## 2021-08-08 RX ADMIN — POLYETHYLENE GLYCOL 3350 17 G: 17 POWDER, FOR SOLUTION ORAL at 08:42

## 2021-08-08 RX ADMIN — APIXABAN 5 MG: 5 TABLET, FILM COATED ORAL at 21:55

## 2021-08-08 RX ADMIN — METOPROLOL TARTRATE 75 MG: 25 TABLET, FILM COATED ORAL at 21:55

## 2021-08-08 RX ADMIN — BISACODYL 10 MG: 10 SUPPOSITORY RECTAL at 19:20

## 2021-08-08 RX ADMIN — CITALOPRAM 10 MG: 20 TABLET, FILM COATED ORAL at 08:43

## 2021-08-08 RX ADMIN — GABAPENTIN 100 MG: 100 CAPSULE ORAL at 21:55

## 2021-08-08 RX ADMIN — RAMIPRIL 20 MG: 10 CAPSULE ORAL at 08:42

## 2021-08-08 RX ADMIN — BUSPIRONE HYDROCHLORIDE 5 MG: 5 TABLET ORAL at 21:55

## 2021-08-08 RX ADMIN — SODIUM CHLORIDE, PRESERVATIVE FREE 10 ML: 5 INJECTION INTRAVENOUS at 21:56

## 2021-08-08 RX ADMIN — SODIUM CHLORIDE, PRESERVATIVE FREE 10 ML: 5 INJECTION INTRAVENOUS at 08:42

## 2021-08-08 RX ADMIN — MICONAZOLE NITRATE: 20 POWDER TOPICAL at 21:55

## 2021-08-08 RX ADMIN — CEFTRIAXONE SODIUM 1000 MG: 1 INJECTION, POWDER, FOR SOLUTION INTRAMUSCULAR; INTRAVENOUS at 21:56

## 2021-08-08 RX ADMIN — GABAPENTIN 100 MG: 100 CAPSULE ORAL at 13:35

## 2021-08-08 RX ADMIN — BUSPIRONE HYDROCHLORIDE 5 MG: 5 TABLET ORAL at 08:42

## 2021-08-08 RX ADMIN — METOPROLOL TARTRATE 75 MG: 25 TABLET, FILM COATED ORAL at 08:42

## 2021-08-08 RX ADMIN — INSULIN GLARGINE 30 UNITS: 100 INJECTION, SOLUTION SUBCUTANEOUS at 22:01

## 2021-08-08 RX ADMIN — ATORVASTATIN CALCIUM 40 MG: 40 TABLET, FILM COATED ORAL at 21:55

## 2021-08-08 ASSESSMENT — PAIN SCALES - GENERAL
PAINLEVEL_OUTOF10: 0
PAINLEVEL_OUTOF10: 6
PAINLEVEL_OUTOF10: 4
PAINLEVEL_OUTOF10: 0

## 2021-08-08 NOTE — PROGRESS NOTES
Hospitalist Progress Note    Patient:  Ricardo Dey      Unit/Bed:7K-05/005-A    YOB: 1940    MRN: [id number]       Acct: [de-identified]     PCP: Diann Garcia MD    Date of Admission: 7/24/2021    Assessment/Plan:    1. Incidental positive COVID: Pt needed to be COVID tested prior to being discharged. Pt was COVID positive. Pt is asymptomatic. Will speak with SW on Monday about DC plan. 2. Physical deconditioning/debility--likely secondary to age and recent surgery; PT/OT, social work is assisting with placement  3. Hypokalemia--resolved with replacement  4. Osteomyelitis L2-L3 status post laminectomy--appreciate neurosurgical input; on Rocephin~infectious disease will not see as pt follows with Dr Jose Hager; appears patient to be on Rocephin for total of 6 weeks~per review of old records from [facility] the patient appears to have been started on Rocephin on 6/30 for total of 6 weeks and appears to end on August 11  5. Diabetes mellitus type 2, uncontrolled--on Lantus along with low-dose sliding scale, monitor  6. Essential hypertension, uncontrolled--on Norvasc, Lopressor, Lasix Imdur, Altace, monitor  7. Chronic diastolic heart failure--on Lopressor, Lasix  8. VHD with moderate tricuspid regurgitation/mild aortic stenosis  9. Atrial fibrillation--on Eliquis, amiodarone  10. Normocytic anemia  11. Moderate malnutrition--per dietitian    Dispo: Awaiting placement for patient since she is now COVID positive.      Expected discharge date: Pending placement     Disposition:    [] Home       [] TCU       [] Rehab       [] Psych       [x] SNF       [] [place]       [] Other-    Chief Complaint: Inability to care for herself    Hospital Course: Per progress note dated 7/27: Nathen Cronin a [de-identified] y.o. female who presents to the emergency department for evaluation of bilateral leg weakness.  The patient states after her morning routine yesterday morning she noticed increased weakness when walking and is now unable to walk with her walker. She underwent low back surgery a month ago and states that since the surgery she has been having trouble walking and has had to ambulate using a walker.  She also reports new paresthesias bilaterally in both lower extremities.  She denies chest pain, shortness of breath, diaphoresis, diarrhea, or constipation. The patient has no other acute complaints at this time. MRI lumbar spine 2 weeks agp showed discitis and   osteomyelitis at L2-L3, laminectomy L2-3 with possible abscess posteriorly external to the   spinal canal, no insignificant interval changes noted overall. Dr Pako Aceves and DR Jhoan Buitrago   consulted who both agree patient does not need any surgical intervention at this time. Patient was to discharge previousy on 6 wks of antiobiotic however this was not done at   [facility]. Patient has PICC. Patient is feeling much improved, labs stable, afebrile. Patient   working with therapy. Dr Jhoan Buitrago wrote for Rx for Ceftriaxone and this will be continued for   6 wks. Patient stable for d/c to SNF,\"    7/28--> hemodynamically stable, laboratory studies are stable except hemoglobin at 9.4 and this morning her glucose was 67    7/29--> infectious disease will not see patient as the patient follows with another ID doctor at [facility] so attempting to obtain medical records; hemodynamically stable, glucose at 97 this morning    7/30--> old records from [facility] reviewed and timeframe was established for Rocephin; hemodynamically stable; awaiting plan from  regarding placement    7/31--> hemodynamically stable, sugars past 24 hours ranged from 120-271    8/1--> hemodynamically stable, sugars past 24 hours have ranged     8/2-->did not participate with OT yesterday; hemodynamically stable; awaiting ECF pre-CERT    Subjective (past 24 hours): No acute events overnight. Pt was in bed during evaluation.  Pt is eager to be DC'ed.       Medications:  Reviewed    Infusion Medications    dextrose      sodium chloride       Scheduled Medications    miconazole   Topical BID    senna  1 tablet Oral Nightly    polyethylene glycol  17 g Oral Daily    pantoprazole  40 mg Oral QAM AC    insulin lispro  0-6 Units Subcutaneous TID WC    insulin lispro  0-3 Units Subcutaneous Nightly    busPIRone  5 mg Oral BID    citalopram  10 mg Oral Daily    docusate sodium  100 mg Oral BID    gabapentin  100 mg Oral TID    amiodarone  200 mg Oral Daily    amLODIPine  5 mg Oral Daily    apixaban  5 mg Oral BID    atorvastatin  40 mg Oral Nightly    cyclobenzaprine  10 mg Oral BID    isosorbide mononitrate  30 mg Oral Daily    [Held by provider] labetalol  100 mg Oral BID    insulin glargine  30 Units Subcutaneous Nightly    magnesium oxide  400 mg Oral Daily    metoprolol tartrate  75 mg Oral BID    potassium chloride  10 mEq Oral Q MWF    ramipril  20 mg Oral Daily    sodium chloride flush  10 mL Intravenous 2 times per day    cefTRIAXone (ROCEPHIN) IV  1,000 mg Intravenous Q24H    furosemide  20 mg Oral Daily     PRN Meds: glucose, dextrose, glucagon (rDNA), dextrose, sodium chloride flush, sodium chloride, ondansetron **OR** ondansetron, polyethylene glycol, acetaminophen **OR** acetaminophen, potassium chloride **OR** potassium alternative oral replacement **OR** potassium chloride, magnesium sulfate, HYDROcodone 5 mg - acetaminophen      Intake/Output Summary (Last 24 hours) at 8/8/2021 1338  Last data filed at 8/7/2021 2045  Gross per 24 hour   Intake 550 ml   Output --   Net 550 ml       Diet:  Adult Oral Nutrition Supplement; Diabetic Oral Supplement  Adult Oral Nutrition Supplement; Other Oral Supplement; activa tid with meals  ADULT DIET; Regular; 4 carb choices (60 gm/meal);  Low Sodium (2 gm)    Exam:  BP (!) 178/87   Pulse 87   Temp 97.9 °F (36.6 °C) (Oral)   Resp 16   Ht 5' 4\" (1.626 m)   Wt 175 lb 14.8 oz (79.8 kg) SpO2 99%   BMI 30.20 kg/m²     General appearance: No apparent distress, appears stated age and cooperative. HEENT: Pupils equal, round, and reactive to light. Conjunctivae/corneas clear. Neck: Supple, with full range of motion. No jugular venous distention. Trachea midline. Respiratory:  Normal respiratory effort. Clear to auscultation, bilaterally without Rales/Wheezes/Rhonchi. Cardiovascular: Regular rate and rhythm with normal S1/S2; (+) murmur noted. Abdomen: Soft, non-tender, non-distended with normal bowel sounds. Musculoskeletal: passive and active ROM x 4 extremities. Skin: Skin color, texture, turgor normal.    Neurologic:  Neurovascularly intact without any focal sensory/motor deficits. Cranial nerves: II-XII intact, grossly non-focal.  Psychiatric: Alert and oriented, thought content appropriate  Capillary Refill: Brisk,< 3 seconds   Peripheral Pulses: +2 palpable, equal bilaterally       Labs:   No results for input(s): WBC, HGB, HCT, PLT in the last 72 hours. No results for input(s): NA, K, CL, CO2, BUN, CREATININE, CALCIUM, PHOS in the last 72 hours. Invalid input(s): [address]  Microbiology:    None    Radiology:  MRI LUMBAR SPINE W WO CONTRAST    Result Date: 7/25/2021  PROCEDURE: MRI LUMBAR SPINE W WO CONTRAST CLINICAL INFORMATION: Lumbar diskitis; LE weakness. COMPARISON: Report of previous  MRI scan dated 6/29/2021. . TECHNIQUE: Sagittal and axial T1 and T2-weighted images were obtained to the lumbar spine. Postcontrast axial and sagittal T1-weighted images were also obtained. FINDINGS: There is abnormal signal intensity in approximately the L1 vertebral body,, superior aspect of the L2 vertebral body, T12-L1 and L1-2 disc spaces consistent with vertebral body osteomyelitis and disc space infection. There is possible previous instrumentation at L1-2. There is abnormal signal intensity enhancement the soft tissues dorsally at L1 and L2. The lumbar vertebral bodies are normally aligned. There is normal marrow signal throughout. There is no bone marrow edema. There are no compression fractures. No pars defects are noted. The discs have normal signal throughout. The visualized aspects of the distal spinal cord are normal. The nerve roots of the cauda equina and the tip of the conus are normal. There are no gross abnormalities in the distal thoracic spine. On the axial images, at T12-L1, there is mild canal and moderate bilateral from stenosis At L1-L2, there is mild canal and moderate to severe bilateral foraminal stenosis. At L2-3, there is mild to moderate canal and moderate bilateral foraminal stenosis. At L3-4, there is moderate canal and moderate to severe bilateral foraminal stenosis At L4-5, there is mild-to-moderate canal and moderate bilateral foraminal stenosis At L5-S1, there is mild canal and moderate bilateral foraminal stenosis. There is degenerative change involving the sacroiliac joints bilaterally. There is no abnormal enhancement. There is atrophy involving the iliacus, psoas and paraspinal muscles bilaterally. There is a left hip replacement. 1. Previous instrumentation at L1-2. 2. Abnormal signal intensity within the L1 vertebral body and superior aspect of the L2 vertebral body consistent with osteomyelitis. 3. Abnormal signal intensity within the T12-L1 and L1-2 disc spaces, consistent with disc space infection. 4. Abnormal signal intensity and enhancement in the soft tissues dorsally at L1 and L2 consistent with postoperative and possible inflammatory changes.  5. There is mild-to-moderate canal and moderate bilateral from stenosis at L2-3 and L4-5. 6. There is moderate canal and moderate to severe bilateral foraminal stenosis at L3-4. 7. There is mild canal and moderate severe bilateral foraminal stenosis at L1-2. 8. There is mild canal and moderate bilateral foraminal stenosis at T12-L1 and L5-S1. 9. There is degenerative change involving the sacroiliac joints bilaterally. 10. There is atrophy involving the because, psoas and paraspinal muscles bilaterally. 11. There is a left hip replacement. **This report has been created using voice recognition software. It may contain minor errors which are inherent in voice recognition technology. ** Final report electronically signed by DR Vamsi Sales on 7/25/2021 1:34 PM    XR CHEST 1 VIEW    Result Date: 7/25/2021  PROCEDURE: XR CHEST 1 VIEW CLINICAL INFORMATION: picc line placement COMPARISON: No prior study. TECHNIQUE:  AP chest single view  FINDINGS: There is a right upper extremity PICC present with the tip overlying the cavoatrial junction. The heart size is normal. No focal consolidation, pleural effusion or pneumothorax is seen. Left shoulder joint arthrosis is noted. 1. Right upper extremity PICC tip projects over the cavoatrial junction. **This report has been created using voice recognition software. It may contain minor errors which are inherent in voice recognition technology. ** Final report electronically signed by Dr. Barbara Harrison on 7/25/2021 10:30 AM      DVT prophylaxis: [] Lovenox                                 [] SCDs                                 [] SQ Heparin                                 [] Encourage ambulation           [x] Already on Anticoagulation~Eliquis     Code Status: DNR-CCA    Tele:   [] yes             [x] no    Active Hospital Problems    Diagnosis Date Noted    Moderate malnutrition ([address] [E44.0] 07/28/2021     Class: Acute    Weakness [R53.1] 07/26/2021    UTI (urinary tract infection) [N39.0] 07/24/2021       Electronically signed by PRASHANT Vázquez on 8/8/2021 at 1:38 PM

## 2021-08-09 LAB
ANION GAP SERPL CALCULATED.3IONS-SCNC: 11 MEQ/L (ref 8–16)
BUN BLDV-MCNC: 19 MG/DL (ref 7–22)
CALCIUM SERPL-MCNC: 9.3 MG/DL (ref 8.5–10.5)
CHLORIDE BLD-SCNC: 99 MEQ/L (ref 98–111)
CO2: 27 MEQ/L (ref 23–33)
CREAT SERPL-MCNC: 0.6 MG/DL (ref 0.4–1.2)
ERYTHROCYTE [DISTWIDTH] IN BLOOD BY AUTOMATED COUNT: 15.7 % (ref 11.5–14.5)
ERYTHROCYTE [DISTWIDTH] IN BLOOD BY AUTOMATED COUNT: 50.7 FL (ref 35–45)
GFR SERPL CREATININE-BSD FRML MDRD: > 90 ML/MIN/1.73M2
GLUCOSE BLD-MCNC: 127 MG/DL (ref 70–108)
GLUCOSE BLD-MCNC: 131 MG/DL (ref 70–108)
GLUCOSE BLD-MCNC: 171 MG/DL (ref 70–108)
GLUCOSE BLD-MCNC: 197 MG/DL (ref 70–108)
GLUCOSE BLD-MCNC: 91 MG/DL (ref 70–108)
HCT VFR BLD CALC: 30.5 % (ref 37–47)
HEMOGLOBIN: 9.2 GM/DL (ref 12–16)
MCH RBC QN AUTO: 26.6 PG (ref 26–33)
MCHC RBC AUTO-ENTMCNC: 30.2 GM/DL (ref 32.2–35.5)
MCV RBC AUTO: 88.2 FL (ref 81–99)
PLATELET # BLD: 311 THOU/MM3 (ref 130–400)
PMV BLD AUTO: 10.2 FL (ref 9.4–12.4)
POTASSIUM REFLEX MAGNESIUM: 4.2 MEQ/L (ref 3.5–5.2)
RBC # BLD: 3.46 MILL/MM3 (ref 4.2–5.4)
SODIUM BLD-SCNC: 137 MEQ/L (ref 135–145)
WBC # BLD: 5.6 THOU/MM3 (ref 4.8–10.8)

## 2021-08-09 PROCEDURE — 80048 BASIC METABOLIC PNL TOTAL CA: CPT

## 2021-08-09 PROCEDURE — 99232 SBSQ HOSP IP/OBS MODERATE 35: CPT | Performed by: PHYSICIAN ASSISTANT

## 2021-08-09 PROCEDURE — 6370000000 HC RX 637 (ALT 250 FOR IP): Performed by: NURSE PRACTITIONER

## 2021-08-09 PROCEDURE — 97530 THERAPEUTIC ACTIVITIES: CPT

## 2021-08-09 PROCEDURE — 2580000003 HC RX 258: Performed by: INTERNAL MEDICINE

## 2021-08-09 PROCEDURE — 6360000002 HC RX W HCPCS: Performed by: INTERNAL MEDICINE

## 2021-08-09 PROCEDURE — 97110 THERAPEUTIC EXERCISES: CPT

## 2021-08-09 PROCEDURE — 36415 COLL VENOUS BLD VENIPUNCTURE: CPT

## 2021-08-09 PROCEDURE — 1200000000 HC SEMI PRIVATE

## 2021-08-09 PROCEDURE — 6370000000 HC RX 637 (ALT 250 FOR IP): Performed by: PHYSICIAN ASSISTANT

## 2021-08-09 PROCEDURE — 85027 COMPLETE CBC AUTOMATED: CPT

## 2021-08-09 PROCEDURE — 82948 REAGENT STRIP/BLOOD GLUCOSE: CPT

## 2021-08-09 PROCEDURE — 97116 GAIT TRAINING THERAPY: CPT

## 2021-08-09 PROCEDURE — 6370000000 HC RX 637 (ALT 250 FOR IP): Performed by: INTERNAL MEDICINE

## 2021-08-09 RX ADMIN — METOPROLOL TARTRATE 75 MG: 25 TABLET, FILM COATED ORAL at 08:28

## 2021-08-09 RX ADMIN — FUROSEMIDE 20 MG: 40 TABLET ORAL at 08:26

## 2021-08-09 RX ADMIN — APIXABAN 5 MG: 5 TABLET, FILM COATED ORAL at 21:00

## 2021-08-09 RX ADMIN — ISOSORBIDE MONONITRATE 30 MG: 30 TABLET ORAL at 08:29

## 2021-08-09 RX ADMIN — GABAPENTIN 100 MG: 100 CAPSULE ORAL at 21:00

## 2021-08-09 RX ADMIN — AMIODARONE HYDROCHLORIDE 200 MG: 200 TABLET ORAL at 08:29

## 2021-08-09 RX ADMIN — RAMIPRIL 20 MG: 10 CAPSULE ORAL at 08:26

## 2021-08-09 RX ADMIN — CITALOPRAM 10 MG: 20 TABLET, FILM COATED ORAL at 08:29

## 2021-08-09 RX ADMIN — BUSPIRONE HYDROCHLORIDE 5 MG: 5 TABLET ORAL at 08:30

## 2021-08-09 RX ADMIN — CEFTRIAXONE SODIUM 1000 MG: 1 INJECTION, POWDER, FOR SOLUTION INTRAMUSCULAR; INTRAVENOUS at 21:00

## 2021-08-09 RX ADMIN — ATORVASTATIN CALCIUM 40 MG: 40 TABLET, FILM COATED ORAL at 21:00

## 2021-08-09 RX ADMIN — INSULIN GLARGINE 30 UNITS: 100 INJECTION, SOLUTION SUBCUTANEOUS at 21:07

## 2021-08-09 RX ADMIN — AMLODIPINE BESYLATE 5 MG: 5 TABLET ORAL at 08:30

## 2021-08-09 RX ADMIN — APIXABAN 5 MG: 5 TABLET, FILM COATED ORAL at 08:30

## 2021-08-09 RX ADMIN — METOPROLOL TARTRATE 75 MG: 25 TABLET, FILM COATED ORAL at 21:00

## 2021-08-09 RX ADMIN — CYCLOBENZAPRINE 10 MG: 10 TABLET, FILM COATED ORAL at 21:00

## 2021-08-09 RX ADMIN — Medication 400 MG: at 08:29

## 2021-08-09 RX ADMIN — BUSPIRONE HYDROCHLORIDE 5 MG: 5 TABLET ORAL at 21:00

## 2021-08-09 RX ADMIN — CYCLOBENZAPRINE 10 MG: 10 TABLET, FILM COATED ORAL at 08:28

## 2021-08-09 RX ADMIN — INSULIN LISPRO 1 UNITS: 100 INJECTION, SOLUTION INTRAVENOUS; SUBCUTANEOUS at 11:32

## 2021-08-09 RX ADMIN — ACETAMINOPHEN 650 MG: 325 TABLET ORAL at 14:22

## 2021-08-09 RX ADMIN — BISACODYL 10 MG: 10 SUPPOSITORY RECTAL at 05:57

## 2021-08-09 RX ADMIN — SODIUM CHLORIDE, PRESERVATIVE FREE 10 ML: 5 INJECTION INTRAVENOUS at 21:01

## 2021-08-09 RX ADMIN — HYDROCODONE BITARTRATE AND ACETAMINOPHEN 1 TABLET: 5; 325 TABLET ORAL at 00:49

## 2021-08-09 RX ADMIN — HYDROCODONE BITARTRATE AND ACETAMINOPHEN 1 TABLET: 5; 325 TABLET ORAL at 08:15

## 2021-08-09 RX ADMIN — MICONAZOLE NITRATE: 20 POWDER TOPICAL at 21:01

## 2021-08-09 RX ADMIN — POTASSIUM CHLORIDE 10 MEQ: 750 TABLET, EXTENDED RELEASE ORAL at 08:30

## 2021-08-09 RX ADMIN — ACETAMINOPHEN 650 MG: 325 TABLET ORAL at 05:56

## 2021-08-09 RX ADMIN — SENNOSIDES 8.6 MG: 8.6 TABLET, COATED ORAL at 21:00

## 2021-08-09 RX ADMIN — GABAPENTIN 100 MG: 100 CAPSULE ORAL at 14:22

## 2021-08-09 RX ADMIN — HYDROCODONE BITARTRATE AND ACETAMINOPHEN 1 TABLET: 5; 325 TABLET ORAL at 18:17

## 2021-08-09 RX ADMIN — SODIUM CHLORIDE, PRESERVATIVE FREE 10 ML: 5 INJECTION INTRAVENOUS at 08:34

## 2021-08-09 RX ADMIN — GABAPENTIN 100 MG: 100 CAPSULE ORAL at 08:29

## 2021-08-09 RX ADMIN — PANTOPRAZOLE SODIUM 40 MG: 40 TABLET, DELAYED RELEASE ORAL at 05:57

## 2021-08-09 RX ADMIN — DOCUSATE SODIUM 100 MG: 100 CAPSULE ORAL at 08:29

## 2021-08-09 RX ADMIN — DOCUSATE SODIUM 100 MG: 100 CAPSULE ORAL at 21:00

## 2021-08-09 ASSESSMENT — PAIN SCALES - GENERAL
PAINLEVEL_OUTOF10: 0
PAINLEVEL_OUTOF10: 7
PAINLEVEL_OUTOF10: 5
PAINLEVEL_OUTOF10: 0
PAINLEVEL_OUTOF10: 7
PAINLEVEL_OUTOF10: 10
PAINLEVEL_OUTOF10: 10
PAINLEVEL_OUTOF10: 0
PAINLEVEL_OUTOF10: 0
PAINLEVEL_OUTOF10: 10
PAINLEVEL_OUTOF10: 5
PAINLEVEL_OUTOF10: 9
PAINLEVEL_OUTOF10: 0
PAINLEVEL_OUTOF10: 3

## 2021-08-09 ASSESSMENT — PAIN DESCRIPTION - ORIENTATION
ORIENTATION: RIGHT
ORIENTATION: RIGHT

## 2021-08-09 ASSESSMENT — PAIN DESCRIPTION - LOCATION
LOCATION: LEG
LOCATION: LEG

## 2021-08-09 ASSESSMENT — PAIN DESCRIPTION - DESCRIPTORS: DESCRIPTORS: ACHING;BURNING

## 2021-08-09 ASSESSMENT — PAIN DESCRIPTION - ONSET: ONSET: ON-GOING

## 2021-08-09 ASSESSMENT — PAIN DESCRIPTION - PAIN TYPE
TYPE: CHRONIC PAIN
TYPE: CHRONIC PAIN

## 2021-08-09 ASSESSMENT — PAIN DESCRIPTION - FREQUENCY: FREQUENCY: INTERMITTENT

## 2021-08-09 ASSESSMENT — PAIN - FUNCTIONAL ASSESSMENT: PAIN_FUNCTIONAL_ASSESSMENT: PREVENTS OR INTERFERES SOME ACTIVE ACTIVITIES AND ADLS

## 2021-08-09 ASSESSMENT — PAIN DESCRIPTION - PROGRESSION: CLINICAL_PROGRESSION: NOT CHANGED

## 2021-08-09 NOTE — PROGRESS NOTES
feeling ready and the need to change her PICC line dressing everyday. [facility] but never had a chance to initiate d/t being home less than 24 hours. Restrictions/Precautions:  Restrictions/Precautions: Isolation  Position Activity Restriction  Spinal Precautions: No Bending, No Lifting, No Twisting  Other position/activity restrictions: hx lumbar laminectomy L2-L3 and osteomyelitis tx with abx, covid 19+ on 8/5/21       SUBJECTIVE: pt in bed on arrival and agreeable for therapy she did decline sitting up in chair following session- pt asked to use bathroom during session     PAIN: pt inconsistent with pain number she denied any back pain however c/o of pain at right LE from buttock to knee and described it as a sharp pain and initially stated pain is a 10/10 but later she stated \"its really not that bad this time that I got up\"    Vitals: Vitals not assessed per clinical judgement, see nursing flowsheet    OBJECTIVE:  Bed Mobility:  Rolling to Right: Contact Guard Assistance   Supine to Sit: Contact Guard Assistance  Sit to Supine: Moderate Assistance, at diego LEs    * with HOB flat and no rail- pt moved slow and guarded   Transfers:  Sit to Stand: [address], from bed but min assist from toilet even wt grab bar   Stand to [facility], cues for hand placement    Ambulation:  Contact Guard Assistance  Distance: 10x1, 50x1  Surface: Level Tile  Device: rolling walker   Gait Deviations:  very slow and guarded, noted decreased step length and lacking heel strike at diego LEs, pt lacking TKE at left LE with left trunk shortening and cues for right shoulder     Balance:  standing w/ one UE at support [facility] CGA for balance however she required physical assist to complete toileting task, she was able to release diego UEs to wash hands w/ CGA     Exercise:  Patient was guided in 1 set(s) 10 reps of exercise to both lower extremities.   Ankle pumps, Glut sets, [employer], Heelslides and Short arc quads. Exercises were completed for increased independence with functional mobility. Functional Outcome Measures: Completed  AM-PAC Inpatient Mobility Raw Score : 16  AM-PAC Inpatient T-Scale Score : 40.78    ASSESSMENT:  Assessment: Patient progressing toward established goals. and she cont to demonstrate generalized weakness and decreased balance and endurance. Pt would benefit from cont skilled therapy   Activity Tolerance:  Patient tolerance of  treatment: fair. Equipment Recommendations:Equipment Needed: No  Discharge Recommendations:    Subacute/Skilled Nursing Facility    Plan: Times per week: 3-5x O  Times per day: Daily  Current Treatment Recommendations: Strengthening, Neuromuscular Re-education, Home Exercise Program, Safety Education & Training, Balance Training, Endurance Training, Patient/Caregiver Education & Training, Functional Mobility Training, Transfer Training, Gait Training, Stair training    Patient Education  Patient Education: Plan of Care    Goals:  Patient goals : go somewhere for therapy, find out what is causing leg weakness  Short term goals  Time Frame for Short term goals: by hospital discharge  Short term goal 1: Supine to/from sit, using log roll, with modified independence for ease of transfers at discharge site. Short term goal 2: Sit to/from stand with modified independence for ease of transfers at discharge site. Short term goal 3: Improve 5x STS time to less than 14.8 seconds to reflect age appropriate norms, indicating improved functional strength and endurance. Short term goal 4: Ambulate 48' with RW and modified independence for home distance ambulation. Short term goal 5: Ascend/descend 4 steps with BHR with modified independence for entry into home. Long term goals  Time Frame for Long term goals : N/A due to short ELOS. Following session, patient left in safe position with all fall risk precautions in place.

## 2021-08-09 NOTE — PROGRESS NOTES
Oral Supplement; activia yogurt and hot tea with meals per pt. request    Anthropometric Measures:  · Height: 5' 4\" (162.6 cm)  · Current Body Weight: 175 lb 14.8 oz (79.8 kg) (8/1; no edema)   · Admission Body Weight: 171 lb 1.2 oz (77.6 kg) (7/27 no edema)    · Usual Body Weight: 193 lb (87.5 kg) (per EMR 5/15/21. Pt reports UBW ~190#)     · Ideal Body Weight: 120 lbs;      · BMI: 30.2  · BMI Categories: Obese Class 1 (BMI 30.0-34. 9)       Nutrition Diagnosis:   · Moderate malnutrition related to inadequate protein-energy intake as evidenced by  (11.3% weight loss in 3 months. less than 75% projected energy needs for greater than 7 days)      Nutrition Interventions:   Food and/or Nutrient Delivery:  Continue Current Diet, Continue Oral Nutrition Supplement  Nutrition Education/Counseling:  Education initiated (8/9 Encouraged po, good nutrition at best efforts.)   Coordination of Nutrition Care:  No recommendation at this time    Goals:  75% or more CHO controlled healthy diet during LOS       Nutrition Monitoring and Evaluation:   Behavioral-Environmental Outcomes:  None Identified   Food/Nutrient Intake Outcomes:  Diet Advancement/Tolerance, Food and Nutrient Intake, Supplement Intake  Physical Signs/Symptoms Outcomes:  Biochemical Data, Chewing or Swallowing, Constipation, Fluid Status or Edema, Hemodynamic Status, Nutrition Focused Physical Findings, Skin, Weight     Discharge Planning:     Too soon to determine     Electronically signed by Jermaine Davis RD, LD on 8/9/21 at 3:40 PM EDT    Contact: [phone]

## 2021-08-09 NOTE — PROGRESS NOTES
[address] [facility]  Occupational Therapy  Daily Note  Time:   Time In: 1420  Time Out: 1446  Timed Code Treatment Minutes: 26 Minutes  Minutes: 26          Date: 2021  Patient Name: Darren Ford,   Gender: female      Room: [facility]-A  MRN: [id number]  : 1940  ([de-identified] y.o.)  Referring Practitioner: Dr. Ester Chanel. Taj  Diagnosis: UTI  Additional Pertinent Hx: [de-identified] y.o. female who presents to the emergency department for evaluation of bilateral leg weakness. The patient states after her morning routine yesterday morning she noticed increased weakness when walking and is now unable to walk with her walker. She underwent low back surgery a month ago and states that since the surgery she has been having trouble walking and has had to ambulate using a walker. She also reports new paresthesias bilaterally in both lower extremities. She denies chest pain, shortness of breath, diaphoresis, diarrhea, or constipation. \"    Restrictions/Precautions:  Restrictions/Precautions: Isolation  Position Activity Restriction  Spinal Precautions: No Bending, No Lifting, No Twisting  Other position/activity restrictions: hx lumbar laminectomy L2-L3 and osteomyelitis tx with abx, covid 19+ on 21     SUBJECTIVE: Pt. Treatment okayed by nurse. Pt. In room in the bed upon entry pleasant and agreeable to OT treatment. PAIN: No pain voiced     Vitals: Vitals not assessed per clinical judgement, see nursing flowsheet    COGNITION: WFL    ADL:   No ADL's completed this session. Pamela Skelton BALANCE:  Standing Balance: Contact Guard Assistance, with cues for safety. BED MOBILITY:  Supine to Sit: Stand By Assistance      TRANSFERS:  Sit to Stand:  [address], cues for hand placement. Stand to Sit: [address], cues for hand placement. FUNCTIONAL MOBILITY:  Assistive Device: Rolling Walker  Assist Level:  Contact Guard Assistance and with verbal cues .    Distance: laps in room with no LOB fair tolerance      ADDITIONAL ACTIVITIES:  Patient completed BUE strengthening exercises with skilled education on HEP: completed x12 reps x1 set with AROM in all joints and all planes in order to improve UE strength and activity tolerance required for BADL routine and toilet / shower transfers. Patient tolerated , requiring brief rest breaks. Patient also required visual and verbal cues for technique. ASSESSMENT:     Activity Tolerance:  Patient tolerance of  treatment: fair. Discharge Recommendations: [address], Patient would benefit from continued therapy after discharge   Equipment Recommendations: Other: continue to assess  Plan: Times per week: 5x  Current Treatment Recommendations: Strengthening, Patient/Caregiver Education & Training, Balance Training, Endurance Training, Safety Education & Training, Self-Care / ADL    Patient Education  Patient Education: Home Exercise Program, Importance of Increasing Activity and Assistive Device Safety and safety with functional mobility and transfers. Goals  Short term goals  Time Frame for Short term goals: by discharge  Short term goal 1: Pt to complete LB dressign tasks using LHAE as needed with min A  Short term goal 2: Pt to increase standing tolerance to be able to complete grooming tasks at sink with CGA and no LOB during  Short term goal 3: Pt to complete toileting and transfer iwth CGA and no cues for safety    Following session, patient left in safe position with all fall risk precautions in place.

## 2021-08-09 NOTE — PROGRESS NOTES
Hospitalist Progress Note    Patient:  Rich Umana      Unit/Bed:7K-05/005-A    YOB: 1940    MRN: [id number]       Acct: [de-identified]     PCP: Sandrita Leo MD    Date of Admission: 7/24/2021    Assessment/Plan:    1. Incidental positive COVID: Pt needed to be COVID tested prior to being discharged. Pt was COVID positive. Pt is asymptomatic.  states that no facility will take patient for now. 2. Physical deconditioning/debility--likely secondary to age and recent surgery; PT/OT, social work is assisting with placement  3. Hypokalemia--resolved with replacement  4. Osteomyelitis L2-L3 status post laminectomy--appreciate neurosurgical input; on Rocephin~infectious disease will not see as pt follows with Dr Fermin Davis; appears patient to be on Rocephin for total of 6 weeks~per review of old records from [facility] the patient appears to have been started on Rocephin on 6/30 for total of 6 weeks and appears to end on August 11  5. Diabetes mellitus type 2, uncontrolled--on Lantus along with low-dose sliding scale, monitor  6. Essential hypertension, uncontrolled--on Norvasc, Lopressor, Lasix Imdur, Altace, monitor  7. Chronic diastolic heart failure--on Lopressor, Lasix  8. VHD with moderate tricuspid regurgitation/mild aortic stenosis  9. Atrial fibrillation--on Eliquis, amiodarone  10. Normocytic anemia  11. Moderate malnutrition--per dietitian    Dispo: Awaiting placement for patient since she is now COVID positive. Possibly will be DC'ed to home after IV ABX finished.      Expected discharge date: Pending placement     Disposition:    [] Home       [] TCU       [] Rehab       [] Psych       [x] SNF       [] [place]       [] Other-    Chief Complaint: Inability to care for herself    Hospital Course: Per progress note dated 7/27: Dorys Coffey a [de-identified] y.o. female who presents to the emergency department for evaluation of bilateral leg weakness.  The patient states after her morning routine yesterday morning she noticed increased weakness when walking and is now unable to walk with her walker. She underwent low back surgery a month ago and states that since the surgery she has been having trouble walking and has had to ambulate using a walker.  She also reports new paresthesias bilaterally in both lower extremities.  She denies chest pain, shortness of breath, diaphoresis, diarrhea, or constipation. The patient has no other acute complaints at this time. MRI lumbar spine 2 weeks agp showed discitis and   osteomyelitis at L2-L3, laminectomy L2-3 with possible abscess posteriorly external to the   spinal canal, no insignificant interval changes noted overall. Dr Dieter Alfonso and DR Cristine Recio   consulted who both agree patient does not need any surgical intervention at this time. Patient was to discharge previousy on 6 wks of antiobiotic however this was not done at   [facility]. Patient has PICC. Patient is feeling much improved, labs stable, afebrile. Patient   working with therapy. Dr Cristine Recio wrote for Rx for Ceftriaxone and this will be continued for   6 wks. Patient stable for d/c to SNF,\"    7/28--> hemodynamically stable, laboratory studies are stable except hemoglobin at 9.4 and this morning her glucose was 67    7/29--> infectious disease will not see patient as the patient follows with another ID doctor at [facility] so attempting to obtain medical records; hemodynamically stable, glucose at 97 this morning    7/30--> old records from [facility] reviewed and timeframe was established for Rocephin; hemodynamically stable; awaiting plan from  regarding placement    7/31--> hemodynamically stable, sugars past 24 hours ranged from 120-271    8/1--> hemodynamically stable, sugars past 24 hours have ranged     8/2-->did not participate with OT yesterday; hemodynamically stable; awaiting ECF pre-CERT    Subjective (past 24 hours): No acute events overnight. Pt is discouraged that we cannot find a facility for her at [place]. Pt states that she wants to go home before she goes to a \"bumpy\" facility. Medications:  Reviewed    Infusion Medications    dextrose      sodium chloride       Scheduled Medications    bisacodyl  10 mg Rectal QAM AC    miconazole   Topical BID    senna  1 tablet Oral Nightly    polyethylene glycol  17 g Oral Daily    pantoprazole  40 mg Oral QAM AC    insulin lispro  0-6 Units Subcutaneous TID WC    insulin lispro  0-3 Units Subcutaneous Nightly    busPIRone  5 mg Oral BID    citalopram  10 mg Oral Daily    docusate sodium  100 mg Oral BID    gabapentin  100 mg Oral TID    amiodarone  200 mg Oral Daily    amLODIPine  5 mg Oral Daily    apixaban  5 mg Oral BID    atorvastatin  40 mg Oral Nightly    cyclobenzaprine  10 mg Oral BID    isosorbide mononitrate  30 mg Oral Daily    [Held by provider] labetalol  100 mg Oral BID    insulin glargine  30 Units Subcutaneous Nightly    magnesium oxide  400 mg Oral Daily    metoprolol tartrate  75 mg Oral BID    potassium chloride  10 mEq Oral Q MWF    ramipril  20 mg Oral Daily    sodium chloride flush  10 mL Intravenous 2 times per day    cefTRIAXone (ROCEPHIN) IV  1,000 mg Intravenous Q24H    furosemide  20 mg Oral Daily     PRN Meds: magnesium hydroxide, glucose, dextrose, glucagon (rDNA), dextrose, sodium chloride flush, sodium chloride, ondansetron **OR** ondansetron, polyethylene glycol, acetaminophen **OR** acetaminophen, potassium chloride **OR** potassium alternative oral replacement **OR** potassium chloride, magnesium sulfate, HYDROcodone 5 mg - acetaminophen      Intake/Output Summary (Last 24 hours) at 8/9/2021 1440  Last data filed at 8/9/2021 1435  Gross per 24 hour   Intake 600 ml   Output --   Net 600 ml       Diet:  Adult Oral Nutrition Supplement; Diabetic Oral Supplement  Adult Oral Nutrition Supplement;  Other Oral Supplement; activa tid with meals  ADULT DIET; Regular; 4 carb choices (60 gm/meal); Low Sodium (2 gm)    Exam:  /72   Pulse 61   Temp 98.1 °F (36.7 °C) (Oral)   Resp 16   Ht 5' 4\" (1.626 m)   Wt 175 lb 14.8 oz (79.8 kg)   SpO2 98%   BMI 30.20 kg/m²     General appearance: No apparent distress, appears stated age and cooperative. HEENT: Pupils equal, round, and reactive to light. Conjunctivae/corneas clear. Neck: Supple, with full range of motion. No jugular venous distention. Trachea midline. Respiratory:  Normal respiratory effort. Clear to auscultation, bilaterally without Rales/Wheezes/Rhonchi. Cardiovascular: Regular rate and rhythm with normal S1/S2; (+) murmur noted. Abdomen: Soft, non-tender, non-distended with normal bowel sounds. Musculoskeletal: passive and active ROM x 4 extremities. Skin: Skin color, texture, turgor normal.    Neurologic:  Neurovascularly intact without any focal sensory/motor deficits. Cranial nerves: II-XII intact, grossly non-focal.  Psychiatric: Alert and oriented, thought content appropriate  Capillary Refill: Brisk,< 3 seconds   Peripheral Pulses: +2 palpable, equal bilaterally       Labs:   No results for input(s): WBC, HGB, HCT, PLT in the last 72 hours. No results for input(s): NA, K, CL, CO2, BUN, CREATININE, CALCIUM, PHOS in the last 72 hours. Invalid input(s): [address]  Microbiology:    None    Radiology:  MRI LUMBAR SPINE W WO CONTRAST    Result Date: 7/25/2021  PROCEDURE: MRI LUMBAR SPINE W WO CONTRAST CLINICAL INFORMATION: Lumbar diskitis; LE weakness. COMPARISON: Report of previous  MRI scan dated 6/29/2021. . TECHNIQUE: Sagittal and axial T1 and T2-weighted images were obtained to the lumbar spine. Postcontrast axial and sagittal T1-weighted images were also obtained. FINDINGS: There is abnormal signal intensity in approximately the L1 vertebral body,, superior aspect of the L2 vertebral body, T12-L1 and L1-2 disc spaces consistent with vertebral body osteomyelitis and disc space infection. There is possible previous instrumentation at L1-2. There is abnormal signal intensity enhancement the soft tissues dorsally at L1 and L2. The lumbar vertebral bodies are normally aligned. There is normal marrow signal throughout. There is no bone marrow edema. There are no compression fractures. No pars defects are noted. The discs have normal signal throughout. The visualized aspects of the distal spinal cord are normal. The nerve roots of the cauda equina and the tip of the conus are normal. There are no gross abnormalities in the distal thoracic spine. On the axial images, at T12-L1, there is mild canal and moderate bilateral from stenosis At L1-L2, there is mild canal and moderate to severe bilateral foraminal stenosis. At L2-3, there is mild to moderate canal and moderate bilateral foraminal stenosis. At L3-4, there is moderate canal and moderate to severe bilateral foraminal stenosis At L4-5, there is mild-to-moderate canal and moderate bilateral foraminal stenosis At L5-S1, there is mild canal and moderate bilateral foraminal stenosis. There is degenerative change involving the sacroiliac joints bilaterally. There is no abnormal enhancement. There is atrophy involving the iliacus, psoas and paraspinal muscles bilaterally. There is a left hip replacement. 1. Previous instrumentation at L1-2. 2. Abnormal signal intensity within the L1 vertebral body and superior aspect of the L2 vertebral body consistent with osteomyelitis. 3. Abnormal signal intensity within the T12-L1 and L1-2 disc spaces, consistent with disc space infection. 4. Abnormal signal intensity and enhancement in the soft tissues dorsally at L1 and L2 consistent with postoperative and possible inflammatory changes.  5. There is mild-to-moderate canal and moderate bilateral from stenosis at L2-3 and L4-5. 6. There is moderate canal and moderate to severe bilateral foraminal stenosis at L3-4. 7. There is mild canal and moderate severe bilateral foraminal stenosis at L1-2. 8. There is mild canal and moderate bilateral foraminal stenosis at T12-L1 and L5-S1. 9. There is degenerative change involving the sacroiliac joints bilaterally. 10. There is atrophy involving the because, psoas and paraspinal muscles bilaterally. 11. There is a left hip replacement. **This report has been created using voice recognition software. It may contain minor errors which are inherent in voice recognition technology. ** Final report electronically signed by DR Blessing Haas on 7/25/2021 1:34 PM    XR CHEST 1 VIEW    Result Date: 7/25/2021  PROCEDURE: XR CHEST 1 VIEW CLINICAL INFORMATION: picc line placement COMPARISON: No prior study. TECHNIQUE:  AP chest single view  FINDINGS: There is a right upper extremity PICC present with the tip overlying the cavoatrial junction. The heart size is normal. No focal consolidation, pleural effusion or pneumothorax is seen. Left shoulder joint arthrosis is noted. 1. Right upper extremity PICC tip projects over the cavoatrial junction. **This report has been created using voice recognition software. It may contain minor errors which are inherent in voice recognition technology. ** Final report electronically signed by Dr. Jonathan London on 7/25/2021 10:30 AM      DVT prophylaxis: [] Lovenox                                 [] SCDs                                 [] SQ Heparin                                 [] Encourage ambulation           [x] Already on Anticoagulation~Eliquis     Code Status: DNR-CCA    Tele:   [] yes             [x] no    Active Hospital Problems    Diagnosis Date Noted    Moderate malnutrition ([facility] [address] [E44.0] 07/28/2021     Class: Acute    Weakness [R53.1] 07/26/2021    UTI (urinary tract infection) [N39.0] 07/24/2021       Electronically signed by Saintclair Lineman, PA on 8/9/2021 at 2:40 PM

## 2021-08-09 NOTE — PLAN OF CARE
Problem: Nutrition  Goal: Optimal nutrition therapy  Outcome: Ongoing  Nutrition Problem #1: Moderate malnutrition  Intervention: Food and/or Nutrient Delivery: Continue Current Diet, Continue Oral Nutrition Supplement  Nutritional Goals: 75% or more CHO controlled healthy diet during LOS

## 2021-08-09 NOTE — CARE COORDINATION
8/9/21, 2:21 PM EDT    DISCHARGE ON [address] day: 14  Location: -05/005-A Reason for admit: UTI (urinary tract infection) [N39.0]  Weakness [R53.1]   Procedure: Has PICC line  Barriers to Discharge: Admitted per hospitalist with UTI. ID management. IV antibiotics. Neurosurgery saw for back pain. PT/OT. N/V checks. Pain management. Daily weights. I&O. Tested Covid positive last Thursday.  Droplet plus isolation  PCP: Sue Juarez MD  Readmission Risk Score: 17%  Patient Goals/Plan/Treatment Preferences: SW to work on ECF placement - facility in network which will accept Covid positive.

## 2021-08-10 LAB
GLUCOSE BLD-MCNC: 108 MG/DL (ref 70–108)
GLUCOSE BLD-MCNC: 177 MG/DL (ref 70–108)
GLUCOSE BLD-MCNC: 179 MG/DL (ref 70–108)
GLUCOSE BLD-MCNC: 94 MG/DL (ref 70–108)

## 2021-08-10 PROCEDURE — 2580000003 HC RX 258: Performed by: INTERNAL MEDICINE

## 2021-08-10 PROCEDURE — 97110 THERAPEUTIC EXERCISES: CPT

## 2021-08-10 PROCEDURE — 6360000002 HC RX W HCPCS: Performed by: INTERNAL MEDICINE

## 2021-08-10 PROCEDURE — 97535 SELF CARE MNGMENT TRAINING: CPT

## 2021-08-10 PROCEDURE — 82948 REAGENT STRIP/BLOOD GLUCOSE: CPT

## 2021-08-10 PROCEDURE — 6370000000 HC RX 637 (ALT 250 FOR IP): Performed by: NURSE PRACTITIONER

## 2021-08-10 PROCEDURE — 1200000000 HC SEMI PRIVATE

## 2021-08-10 PROCEDURE — 99232 SBSQ HOSP IP/OBS MODERATE 35: CPT | Performed by: PHYSICIAN ASSISTANT

## 2021-08-10 PROCEDURE — 6370000000 HC RX 637 (ALT 250 FOR IP): Performed by: INTERNAL MEDICINE

## 2021-08-10 RX ADMIN — BUSPIRONE HYDROCHLORIDE 5 MG: 5 TABLET ORAL at 08:50

## 2021-08-10 RX ADMIN — METOPROLOL TARTRATE 75 MG: 25 TABLET, FILM COATED ORAL at 08:55

## 2021-08-10 RX ADMIN — INSULIN GLARGINE 30 UNITS: 100 INJECTION, SOLUTION SUBCUTANEOUS at 21:35

## 2021-08-10 RX ADMIN — CITALOPRAM 10 MG: 20 TABLET, FILM COATED ORAL at 08:50

## 2021-08-10 RX ADMIN — FUROSEMIDE 20 MG: 40 TABLET ORAL at 08:52

## 2021-08-10 RX ADMIN — GABAPENTIN 100 MG: 100 CAPSULE ORAL at 08:53

## 2021-08-10 RX ADMIN — DOCUSATE SODIUM 100 MG: 100 CAPSULE ORAL at 08:52

## 2021-08-10 RX ADMIN — HYDROCODONE BITARTRATE AND ACETAMINOPHEN 1 TABLET: 5; 325 TABLET ORAL at 12:54

## 2021-08-10 RX ADMIN — CYCLOBENZAPRINE 10 MG: 10 TABLET, FILM COATED ORAL at 08:51

## 2021-08-10 RX ADMIN — INSULIN LISPRO 1 UNITS: 100 INJECTION, SOLUTION INTRAVENOUS; SUBCUTANEOUS at 11:19

## 2021-08-10 RX ADMIN — POLYETHYLENE GLYCOL 3350 17 G: 17 POWDER, FOR SOLUTION ORAL at 08:59

## 2021-08-10 RX ADMIN — Medication 400 MG: at 08:54

## 2021-08-10 RX ADMIN — GABAPENTIN 100 MG: 100 CAPSULE ORAL at 21:28

## 2021-08-10 RX ADMIN — ATORVASTATIN CALCIUM 40 MG: 40 TABLET, FILM COATED ORAL at 21:28

## 2021-08-10 RX ADMIN — PANTOPRAZOLE SODIUM 40 MG: 40 TABLET, DELAYED RELEASE ORAL at 05:54

## 2021-08-10 RX ADMIN — ISOSORBIDE MONONITRATE 30 MG: 30 TABLET ORAL at 08:54

## 2021-08-10 RX ADMIN — HYDROCODONE BITARTRATE AND ACETAMINOPHEN 1 TABLET: 5; 325 TABLET ORAL at 02:46

## 2021-08-10 RX ADMIN — CEFTRIAXONE SODIUM 1000 MG: 1 INJECTION, POWDER, FOR SOLUTION INTRAMUSCULAR; INTRAVENOUS at 21:28

## 2021-08-10 RX ADMIN — APIXABAN 5 MG: 5 TABLET, FILM COATED ORAL at 08:49

## 2021-08-10 RX ADMIN — METOPROLOL TARTRATE 75 MG: 25 TABLET, FILM COATED ORAL at 21:28

## 2021-08-10 RX ADMIN — DOCUSATE SODIUM 100 MG: 100 CAPSULE ORAL at 21:28

## 2021-08-10 RX ADMIN — SENNOSIDES 8.6 MG: 8.6 TABLET, COATED ORAL at 21:28

## 2021-08-10 RX ADMIN — RAMIPRIL 20 MG: 10 CAPSULE ORAL at 08:56

## 2021-08-10 RX ADMIN — MICONAZOLE NITRATE: 20 POWDER TOPICAL at 21:38

## 2021-08-10 RX ADMIN — AMLODIPINE BESYLATE 5 MG: 5 TABLET ORAL at 08:49

## 2021-08-10 RX ADMIN — APIXABAN 5 MG: 5 TABLET, FILM COATED ORAL at 21:28

## 2021-08-10 RX ADMIN — AMIODARONE HYDROCHLORIDE 200 MG: 200 TABLET ORAL at 08:48

## 2021-08-10 RX ADMIN — GABAPENTIN 100 MG: 100 CAPSULE ORAL at 13:00

## 2021-08-10 RX ADMIN — SODIUM CHLORIDE, PRESERVATIVE FREE 10 ML: 5 INJECTION INTRAVENOUS at 08:57

## 2021-08-10 RX ADMIN — SODIUM CHLORIDE, PRESERVATIVE FREE 10 ML: 5 INJECTION INTRAVENOUS at 21:29

## 2021-08-10 RX ADMIN — CYCLOBENZAPRINE 10 MG: 10 TABLET, FILM COATED ORAL at 21:28

## 2021-08-10 RX ADMIN — BUSPIRONE HYDROCHLORIDE 5 MG: 5 TABLET ORAL at 21:28

## 2021-08-10 RX ADMIN — HYDROCODONE BITARTRATE AND ACETAMINOPHEN 1 TABLET: 5; 325 TABLET ORAL at 19:38

## 2021-08-10 ASSESSMENT — PAIN SCALES - GENERAL
PAINLEVEL_OUTOF10: 7
PAINLEVEL_OUTOF10: 0
PAINLEVEL_OUTOF10: 1
PAINLEVEL_OUTOF10: 2
PAINLEVEL_OUTOF10: 8
PAINLEVEL_OUTOF10: 0
PAINLEVEL_OUTOF10: 10
PAINLEVEL_OUTOF10: 0
PAINLEVEL_OUTOF10: 10
PAINLEVEL_OUTOF10: 2
PAINLEVEL_OUTOF10: 0

## 2021-08-10 ASSESSMENT — PAIN DESCRIPTION - PAIN TYPE
TYPE: CHRONIC PAIN

## 2021-08-10 ASSESSMENT — PAIN DESCRIPTION - ORIENTATION
ORIENTATION: RIGHT;LEFT

## 2021-08-10 ASSESSMENT — PAIN DESCRIPTION - DESCRIPTORS
DESCRIPTORS: ACHING
DESCRIPTORS: ACHING

## 2021-08-10 ASSESSMENT — PAIN DESCRIPTION - LOCATION
LOCATION: LEG

## 2021-08-10 ASSESSMENT — PAIN DESCRIPTION - ONSET: ONSET: ON-GOING

## 2021-08-10 ASSESSMENT — PAIN DESCRIPTION - FREQUENCY: FREQUENCY: INTERMITTENT

## 2021-08-10 ASSESSMENT — PAIN DESCRIPTION - PROGRESSION: CLINICAL_PROGRESSION: NOT CHANGED

## 2021-08-10 NOTE — PLAN OF CARE
Problem: Nutrition  Goal: Optimal nutrition therapy  8/10/2021 0036 by William Proctor RN  Outcome: Ongoing  8/9/2021 1539 by Lindsey Hassan RD, LD  Outcome: Ongoing     Problem: Airway Clearance - Ineffective  Goal: Achieve or maintain patent airway  Outcome: Ongoing     Problem: Gas Exchange - Impaired  Goal: Absence of hypoxia  Outcome: Ongoing  Goal: Promote optimal lung function  Outcome: Ongoing     Problem: Breathing Pattern - Ineffective  Goal: Ability to achieve and maintain a regular respiratory rate  Outcome: Ongoing     Problem: Body Temperature -  Risk of, Imbalanced  Goal: Ability to maintain a body temperature within defined limits  Outcome: Ongoing  Goal: Will regain or maintain usual level of consciousness  Outcome: Ongoing  Goal: Complications related to the disease process, condition or treatment will be avoided or minimized  Outcome: Ongoing     Problem:  Body Temperature -  Risk of, Imbalanced  Goal: Ability to maintain a body temperature within defined limits  Outcome: Ongoing  Goal: Will regain or maintain usual level of consciousness  Outcome: Ongoing  Goal: Complications related to the disease process, condition or treatment will be avoided or minimized  Outcome: Ongoing     Problem: Isolation Precautions - Risk of Spread of Infection  Goal: Prevent transmission of infection  Outcome: Ongoing     Problem: Nutrition Deficits  Goal: Optimize nutritional status  Outcome: Ongoing     Problem: Risk for Fluid Volume Deficit  Goal: Maintain normal heart rhythm  Outcome: Ongoing  Goal: Maintain absence of muscle cramping  Outcome: Ongoing  Goal: Maintain normal serum potassium, sodium, calcium, phosphorus, and pH  Outcome: Ongoing     Problem: Loneliness or Risk for Loneliness  Goal: Demonstrate positive use of time alone when socialization is not possible  Outcome: Ongoing     Problem: Fatigue  Goal: Verbalize increase energy and improved vitality  Outcome: Ongoing     Problem: Patient

## 2021-08-10 NOTE — PROGRESS NOTES
Hospitalist Progress Note    Patient:  Raissa Valdovinos      Unit/Bed:7K-05/005-A    YOB: 1940    MRN: [id number]       Acct: [de-identified]     PCP: Jose Manuel Cespedes MD    Date of Admission: 7/24/2021    Assessment/Plan:    1. Incidental positive COVID: Pt needed to be COVID tested prior to being discharged. Pt was COVID positive. Pt is asymptomatic.  states that no facility will take patient for now. 2. Physical deconditioning/debility--likely secondary to age and recent surgery; PT/OT, social work is assisting with placement  3. Hypokalemia--resolved with replacement  4. Osteomyelitis L2-L3 status post laminectomy--appreciate neurosurgical input; on Rocephin~infectious disease will not see as pt follows with Dr Alie Hassan; appears patient to be on Rocephin for total of 6 weeks~per review of old records from [facility] the patient appears to have been started on Rocephin on 6/30 for total of 6 weeks and appears to end on August 11  5. Diabetes mellitus type 2, uncontrolled--on Lantus along with low-dose sliding scale, monitor  6. Essential hypertension, uncontrolled--on Norvasc, Lopressor, Lasix Imdur, Altace, monitor  7. Chronic diastolic heart failure--on Lopressor, Lasix  8. VHD with moderate tricuspid regurgitation/mild aortic stenosis  9. Atrial fibrillation--on Eliquis, amiodarone  10. Normocytic anemia  11. Moderate malnutrition--per dietitian    Dispo: Home tomorrow after IV ABX and PICC line is pulled.      Expected discharge date: Pending placement     Disposition:    [] Home       [] TCU       [] Rehab       [] Psych       [x] SNF       [] [place]       [] Other-    Chief Complaint: Inability to care for herself    Hospital Course: Per progress note dated 7/27: Hema Loyola a [de-identified] y.o. female who presents to the emergency department for evaluation of bilateral leg weakness.  The patient states after her morning routine yesterday morning she noticed increased weakness when walking and is now unable to walk with her walker. She underwent low back surgery a month ago and states that since the surgery she has been having trouble walking and has had to ambulate using a walker.  She also reports new paresthesias bilaterally in both lower extremities.  She denies chest pain, shortness of breath, diaphoresis, diarrhea, or constipation. The patient has no other acute complaints at this time. MRI lumbar spine 2 weeks agp showed discitis and   osteomyelitis at L2-L3, laminectomy L2-3 with possible abscess posteriorly external to the   spinal canal, no insignificant interval changes noted overall. Dr Pako Aceves and DR Jhoan Buitrago   consulted who both agree patient does not need any surgical intervention at this time. Patient was to discharge previousy on 6 wks of antiobiotic however this was not done at   [facility]. Patient has PICC. Patient is feeling much improved, labs stable, afebrile. Patient   working with therapy. Dr Jhoan Buitrago wrote for Rx for Ceftriaxone and this will be continued for   6 wks. Patient stable for d/c to SNF,\"    7/28--> hemodynamically stable, laboratory studies are stable except hemoglobin at 9.4 and this morning her glucose was 67    7/29--> infectious disease will not see patient as the patient follows with another ID doctor at [facility] so attempting to obtain medical records; hemodynamically stable, glucose at 97 this morning    7/30--> old records from [facility] reviewed and timeframe was established for Rocephin; hemodynamically stable; awaiting plan from  regarding placement    7/31--> hemodynamically stable, sugars past 24 hours ranged from 120-271    8/1--> hemodynamically stable, sugars past 24 hours have ranged     8/2-->did not participate with OT yesterday; hemodynamically stable; awaiting ECF pre-CERT    Subjective (past 24 hours): No acute events overnight.  Pt states that she thinks she needs 2 more weeks in the hospital before she goes home. It was explained to the patient that she has one more day of IV ABX left for 6 week ABX plan. After this ABX, PICC line will be removed and since SW cannot find a facility for her then she will have to go home with [facility]. Medications:  Reviewed    Infusion Medications    dextrose      sodium chloride       Scheduled Medications    bisacodyl  10 mg Rectal QAM AC    miconazole   Topical BID    senna  1 tablet Oral Nightly    polyethylene glycol  17 g Oral Daily    pantoprazole  40 mg Oral QAM AC    insulin lispro  0-6 Units Subcutaneous TID WC    insulin lispro  0-3 Units Subcutaneous Nightly    busPIRone  5 mg Oral BID    citalopram  10 mg Oral Daily    docusate sodium  100 mg Oral BID    gabapentin  100 mg Oral TID    amiodarone  200 mg Oral Daily    amLODIPine  5 mg Oral Daily    apixaban  5 mg Oral BID    atorvastatin  40 mg Oral Nightly    cyclobenzaprine  10 mg Oral BID    isosorbide mononitrate  30 mg Oral Daily    [Held by provider] labetalol  100 mg Oral BID    insulin glargine  30 Units Subcutaneous Nightly    magnesium oxide  400 mg Oral Daily    metoprolol tartrate  75 mg Oral BID    potassium chloride  10 mEq Oral Q MWF    ramipril  20 mg Oral Daily    sodium chloride flush  10 mL Intravenous 2 times per day    cefTRIAXone (ROCEPHIN) IV  1,000 mg Intravenous Q24H    furosemide  20 mg Oral Daily     PRN Meds: magnesium hydroxide, glucose, dextrose, glucagon (rDNA), dextrose, sodium chloride flush, sodium chloride, ondansetron **OR** ondansetron, polyethylene glycol, acetaminophen **OR** acetaminophen, potassium chloride **OR** potassium alternative oral replacement **OR** potassium chloride, magnesium sulfate, HYDROcodone 5 mg - acetaminophen      Intake/Output Summary (Last 24 hours) at 8/10/2021 1633  Last data filed at 8/10/2021 1351  Gross per 24 hour   Intake 511.38 ml   Output --   Net 511.38 ml       Diet:  ADULT DIET;  Regular; 4 carb choices (60 gm/meal); Low Sodium (2 gm)  Adult Oral Nutrition Supplement; Diabetic Oral Supplement  Adult Oral Nutrition Supplement; Other Oral Supplement; activia yogurt and hot tea with meals per pt. request    Exam:  BP (!) 126/59   Pulse 62   Temp 97.7 °F (36.5 °C) (Oral)   Resp 16   Ht 5' 4\" (1.626 m)   Wt 175 lb 14.8 oz (79.8 kg)   SpO2 97%   BMI 30.20 kg/m²     General appearance: No apparent distress, appears stated age and cooperative. HEENT: Pupils equal, round, and reactive to light. Conjunctivae/corneas clear. Neck: Supple, with full range of motion. No jugular venous distention. Trachea midline. Respiratory:  Normal respiratory effort. Clear to auscultation, bilaterally without Rales/Wheezes/Rhonchi. Cardiovascular: Regular rate and rhythm with normal S1/S2; (+) murmur noted. Abdomen: Soft, non-tender, non-distended with normal bowel sounds. Musculoskeletal: passive and active ROM x 4 extremities. Skin: Skin color, texture, turgor normal.    Neurologic:  Neurovascularly intact without any focal sensory/motor deficits. Cranial nerves: II-XII intact, grossly non-focal.  Psychiatric: Alert and oriented, thought content appropriate  Capillary Refill: Brisk,< 3 seconds   Peripheral Pulses: +2 palpable, equal bilaterally       Labs:   Recent Labs     08/09/21  1645   WBC 5.6   HGB 9.2*   HCT 30.5*        Recent Labs     08/09/21  1645      K 4.2   CL 99   CO2 27   BUN 19   CREATININE 0.6   CALCIUM 9.3     Microbiology:    None    Radiology:  MRI LUMBAR SPINE W WO CONTRAST    Result Date: 7/25/2021  PROCEDURE: MRI LUMBAR SPINE W WO CONTRAST CLINICAL INFORMATION: Lumbar diskitis; LE weakness. COMPARISON: Report of previous  MRI scan dated 6/29/2021. . TECHNIQUE: Sagittal and axial T1 and T2-weighted images were obtained to the lumbar spine. Postcontrast axial and sagittal T1-weighted images were also obtained.  FINDINGS: There is abnormal signal intensity in approximately the L1 vertebral body,, superior aspect of the L2 vertebral body, T12-L1 and L1-2 disc spaces consistent with vertebral body osteomyelitis and disc space infection. There is possible previous instrumentation at L1-2. There is abnormal signal intensity enhancement the soft tissues dorsally at L1 and L2. The lumbar vertebral bodies are normally aligned. There is normal marrow signal throughout. There is no bone marrow edema. There are no compression fractures. No pars defects are noted. The discs have normal signal throughout. The visualized aspects of the distal spinal cord are normal. The nerve roots of the cauda equina and the tip of the conus are normal. There are no gross abnormalities in the distal thoracic spine. On the axial images, at T12-L1, there is mild canal and moderate bilateral from stenosis At L1-L2, there is mild canal and moderate to severe bilateral foraminal stenosis. At L2-3, there is mild to moderate canal and moderate bilateral foraminal stenosis. At L3-4, there is moderate canal and moderate to severe bilateral foraminal stenosis At L4-5, there is mild-to-moderate canal and moderate bilateral foraminal stenosis At L5-S1, there is mild canal and moderate bilateral foraminal stenosis. There is degenerative change involving the sacroiliac joints bilaterally. There is no abnormal enhancement. There is atrophy involving the iliacus, psoas and paraspinal muscles bilaterally. There is a left hip replacement. 1. Previous instrumentation at L1-2. 2. Abnormal signal intensity within the L1 vertebral body and superior aspect of the L2 vertebral body consistent with osteomyelitis. 3. Abnormal signal intensity within the T12-L1 and L1-2 disc spaces, consistent with disc space infection. 4. Abnormal signal intensity and enhancement in the soft tissues dorsally at L1 and L2 consistent with postoperative and possible inflammatory changes.  5. There is mild-to-moderate canal and moderate bilateral from stenosis at L2-3 and L4-5. 6. There is moderate canal and moderate to severe bilateral foraminal stenosis at L3-4. 7. There is mild canal and moderate severe bilateral foraminal stenosis at L1-2. 8. There is mild canal and moderate bilateral foraminal stenosis at T12-L1 and L5-S1. 9. There is degenerative change involving the sacroiliac joints bilaterally. 10. There is atrophy involving the because, psoas and paraspinal muscles bilaterally. 11. There is a left hip replacement. **This report has been created using voice recognition software. It may contain minor errors which are inherent in voice recognition technology. ** Final report electronically signed by DR Jorge Sarmiento on 7/25/2021 1:34 PM    XR CHEST 1 VIEW    Result Date: 7/25/2021  PROCEDURE: XR CHEST 1 VIEW CLINICAL INFORMATION: picc line placement COMPARISON: No prior study. TECHNIQUE:  AP chest single view  FINDINGS: There is a right upper extremity PICC present with the tip overlying the cavoatrial junction. The heart size is normal. No focal consolidation, pleural effusion or pneumothorax is seen. Left shoulder joint arthrosis is noted. 1. Right upper extremity PICC tip projects over the cavoatrial junction. **This report has been created using voice recognition software. It may contain minor errors which are inherent in voice recognition technology. ** Final report electronically signed by Dr. Richard Tejada on 7/25/2021 10:30 AM      DVT prophylaxis: [] Lovenox                                 [] SCDs                                 [] SQ Heparin                                 [] Encourage ambulation           [x] Already on Anticoagulation~Eliquis     Code Status: DNR-CCA    Tele:   [] yes             [x] no    Active Hospital Problems    Diagnosis Date Noted    Moderate malnutrition ([address] [E44.0] 07/28/2021     Class: Acute    Weakness [R53.1] 07/26/2021    UTI (urinary tract infection) [N39.0] 07/24/2021       Electronically signed by PRASHANT Massey on

## 2021-08-10 NOTE — CARE COORDINATION
8/10/21, 2:25 PM EDT    DISCHARGE ON [address] day: 15  Location: -05/005-A Reason for admit: UTI (urinary tract infection) [N39.0]  Weakness [R53.1]   Procedure: Has PICC line  Barriers to Discharge: Admitted per hospitalist with UTI. ID management. IV antibiotics will be completed tomorrow. Neurosurgery saw for back pain. PT/OT. N/V checks. Pain management. Daily weights. I&O. Tested Covid positive last Thursday. Droplet plus isolation  PCP: Olivier Burnett MD  Readmission Risk Score: 19%  Patient Goals/Plan/Treatment Preferences: I spoke with Cristiana Carney CNP. Sharmaine Taylor told [facility] that today is her last day for IV antibiotics and she will plan to discharge to home tomorrow. Possible HH. Following with [facility].

## 2021-08-10 NOTE — PLAN OF CARE
Problem: Nutrition Deficits  Goal: Optimize nutritional status  8/10/2021 0938 by Jeanne Christianson RN  Note: Drinks Glucerna 4 to 6 times a day. Usually eats all of meals. Problem: Body Temperature -  Risk of, Imbalanced  Goal: Will regain or maintain usual level of consciousness  8/10/2021 0938 by Jeanne Christianson RN  Note: Awake and alert. Oriented x4. Able to make needs known on a daily bases. Problem: Loneliness or Risk for Loneliness  Goal: Demonstrate positive use of time alone when socialization is not possible  8/10/2021 5278 by Jeanne Christianson RN  Note: Does read and talk to friends and family on the phone. Stressed the importance of communication with patient. Problem: Fatigue  Goal: Verbalize increase energy and improved vitality  8/10/2021 0938 by Jeanne Christianson RN  Note: Up and about in room with therapy and staff. Encouraged to stay up in chair as much as possible. Problem: Nutrition  Goal: Optimal nutrition therapy  8/10/2021 0934 by Jeanne Christianson RN  Outcome: Ongoing  Note: Patient tolerating diet. No nausea or vomiting noted. Problem: Airway Clearance - Ineffective  Goal: Achieve or maintain patent airway  8/10/2021 0934 by Jeanne Christianson RN  Outcome: Completed  Note: O2 saturation remains above 90% on room air. No SOB noted. Care plan reviewed with patient . Patient verbalize understanding of the plan of care and contribute to goal setting.

## 2021-08-10 NOTE — PROGRESS NOTES
Sit: Contact Guard Assistance. FUNCTIONAL MOBILITY:  Assistive Device: Rolling Walker  Assist Level:  Contact Guard Assistance. Distance: To and from bathroom  To recliner chair      ADDITIONAL ACTIVITIES:  Patient completed BUE strengthening exercises with skilled education on HEP: completed x10 reps x1 set with a min resistance band in all joints and all planes in order to improve UE strength and activity tolerance required for BADL routine and toilet / shower transfers. Patient tolerated well, requiring min rest breaks. Patient also required min cues for technique. ASSESSMENT:     Activity Tolerance:  Patient tolerance of  treatment: good. [facility] [address] [facility] Patient would benefit from continued therapy after discharge   Equipment Recommendations: Other: continue to assess  Plan: Times per week: 5x  Current Treatment Recommendations: Strengthening, Patient/Caregiver Education & Training, Balance Training, Endurance Training, Safety Education & Training, Self-Care / ADL    Patient Education  Patient Education: ADL's and Home Exercise Program    Goals  Short term goals  Time Frame for Short term goals: by discharge  Short term goal 1: Pt to complete LB dressign tasks using [address] as needed with min A  Short term goal 2: Pt to increase standing tolerance to be able to complete grooming tasks at sink with CGA and no LOB during  Short term goal 3: Pt to complete toileting and transfer iwth CGA and no cues for safety    Following session, patient left in safe position with all fall risk precautions in place.

## 2021-08-11 VITALS
HEART RATE: 68 BPM | WEIGHT: 175.93 LBS | DIASTOLIC BLOOD PRESSURE: 60 MMHG | BODY MASS INDEX: 30.04 KG/M2 | SYSTOLIC BLOOD PRESSURE: 127 MMHG | HEIGHT: 64 IN | TEMPERATURE: 97.8 F | OXYGEN SATURATION: 96 % | RESPIRATION RATE: 16 BRPM

## 2021-08-11 LAB
GLUCOSE BLD-MCNC: 144 MG/DL (ref 70–108)
GLUCOSE BLD-MCNC: 80 MG/DL (ref 70–108)

## 2021-08-11 PROCEDURE — 97535 SELF CARE MNGMENT TRAINING: CPT

## 2021-08-11 PROCEDURE — 6370000000 HC RX 637 (ALT 250 FOR IP): Performed by: INTERNAL MEDICINE

## 2021-08-11 PROCEDURE — 2580000003 HC RX 258: Performed by: INTERNAL MEDICINE

## 2021-08-11 PROCEDURE — 97530 THERAPEUTIC ACTIVITIES: CPT

## 2021-08-11 PROCEDURE — 97110 THERAPEUTIC EXERCISES: CPT

## 2021-08-11 PROCEDURE — 6360000002 HC RX W HCPCS: Performed by: INTERNAL MEDICINE

## 2021-08-11 PROCEDURE — 99239 HOSP IP/OBS DSCHRG MGMT >30: CPT | Performed by: NURSE PRACTITIONER

## 2021-08-11 PROCEDURE — 97116 GAIT TRAINING THERAPY: CPT

## 2021-08-11 PROCEDURE — 82948 REAGENT STRIP/BLOOD GLUCOSE: CPT

## 2021-08-11 PROCEDURE — 6370000000 HC RX 637 (ALT 250 FOR IP): Performed by: NURSE PRACTITIONER

## 2021-08-11 RX ORDER — GABAPENTIN 100 MG/1
100 CAPSULE ORAL 3 TIMES DAILY
Qty: 90 CAPSULE | Refills: 0 | Status: SHIPPED | OUTPATIENT
Start: 2021-08-11 | End: 2021-09-13 | Stop reason: SDUPTHER

## 2021-08-11 RX ORDER — PANTOPRAZOLE SODIUM 40 MG/1
40 TABLET, DELAYED RELEASE ORAL
Qty: 30 TABLET | Refills: 0 | Status: SHIPPED | OUTPATIENT
Start: 2021-08-11 | End: 2021-08-18 | Stop reason: SDUPTHER

## 2021-08-11 RX ORDER — CITALOPRAM 10 MG/1
10 TABLET ORAL DAILY
Qty: 30 TABLET | Refills: 0 | Status: SHIPPED | OUTPATIENT
Start: 2021-08-11 | End: 2021-08-18 | Stop reason: SDUPTHER

## 2021-08-11 RX ORDER — BUSPIRONE HYDROCHLORIDE 5 MG/1
5 TABLET ORAL 2 TIMES DAILY
Qty: 60 TABLET | Refills: 0 | Status: SHIPPED | OUTPATIENT
Start: 2021-08-11 | End: 2021-08-18 | Stop reason: SDUPTHER

## 2021-08-11 RX ADMIN — CITALOPRAM 10 MG: 20 TABLET, FILM COATED ORAL at 10:45

## 2021-08-11 RX ADMIN — MICONAZOLE NITRATE: 20 POWDER TOPICAL at 10:46

## 2021-08-11 RX ADMIN — AMLODIPINE BESYLATE 5 MG: 5 TABLET ORAL at 10:47

## 2021-08-11 RX ADMIN — HYDROCODONE BITARTRATE AND ACETAMINOPHEN 1 TABLET: 5; 325 TABLET ORAL at 12:19

## 2021-08-11 RX ADMIN — FUROSEMIDE 20 MG: 40 TABLET ORAL at 10:45

## 2021-08-11 RX ADMIN — APIXABAN 5 MG: 5 TABLET, FILM COATED ORAL at 10:45

## 2021-08-11 RX ADMIN — ISOSORBIDE MONONITRATE 30 MG: 30 TABLET ORAL at 10:46

## 2021-08-11 RX ADMIN — SODIUM CHLORIDE, PRESERVATIVE FREE 10 ML: 5 INJECTION INTRAVENOUS at 14:10

## 2021-08-11 RX ADMIN — GABAPENTIN 100 MG: 100 CAPSULE ORAL at 10:44

## 2021-08-11 RX ADMIN — AMIODARONE HYDROCHLORIDE 200 MG: 200 TABLET ORAL at 10:45

## 2021-08-11 RX ADMIN — DOCUSATE SODIUM 100 MG: 100 CAPSULE ORAL at 10:45

## 2021-08-11 RX ADMIN — PANTOPRAZOLE SODIUM 40 MG: 40 TABLET, DELAYED RELEASE ORAL at 06:03

## 2021-08-11 RX ADMIN — CYCLOBENZAPRINE 10 MG: 10 TABLET, FILM COATED ORAL at 10:44

## 2021-08-11 RX ADMIN — METOPROLOL TARTRATE 75 MG: 25 TABLET, FILM COATED ORAL at 10:45

## 2021-08-11 RX ADMIN — RAMIPRIL 20 MG: 10 CAPSULE ORAL at 10:45

## 2021-08-11 RX ADMIN — HYDROCODONE BITARTRATE AND ACETAMINOPHEN 1 TABLET: 5; 325 TABLET ORAL at 04:34

## 2021-08-11 RX ADMIN — BUSPIRONE HYDROCHLORIDE 5 MG: 5 TABLET ORAL at 10:45

## 2021-08-11 RX ADMIN — Medication 400 MG: at 10:47

## 2021-08-11 RX ADMIN — POTASSIUM CHLORIDE 10 MEQ: 750 TABLET, EXTENDED RELEASE ORAL at 10:45

## 2021-08-11 RX ADMIN — CEFTRIAXONE SODIUM 1000 MG: 1 INJECTION, POWDER, FOR SOLUTION INTRAMUSCULAR; INTRAVENOUS at 14:10

## 2021-08-11 ASSESSMENT — PAIN SCALES - GENERAL
PAINLEVEL_OUTOF10: 6
PAINLEVEL_OUTOF10: 7
PAINLEVEL_OUTOF10: 0
PAINLEVEL_OUTOF10: 0
PAINLEVEL_OUTOF10: 7

## 2021-08-11 NOTE — PLAN OF CARE
Problem: Nutrition  Goal: Optimal nutrition therapy  Outcome: Ongoing     Problem: Gas Exchange - Impaired  Goal: Absence of hypoxia  Outcome: Ongoing  Goal: Promote optimal lung function  Outcome: Ongoing     Problem: Breathing Pattern - Ineffective  Goal: Ability to achieve and maintain a regular respiratory rate  Outcome: Ongoing     Problem:  Body Temperature -  Risk of, Imbalanced  Goal: Ability to maintain a body temperature within defined limits  Outcome: Ongoing  Goal: Will regain or maintain usual level of consciousness  Outcome: Ongoing  Goal: Complications related to the disease process, condition or treatment will be avoided or minimized  Outcome: Ongoing     Problem: Isolation Precautions - Risk of Spread of Infection  Goal: Prevent transmission of infection  Outcome: Ongoing     Problem: Nutrition Deficits  Goal: Optimize nutritional status  Outcome: Ongoing     Problem: Risk for Fluid Volume Deficit  Goal: Maintain normal heart rhythm  Outcome: Ongoing  Goal: Maintain absence of muscle cramping  Outcome: Ongoing  Goal: Maintain normal serum potassium, sodium, calcium, phosphorus, and pH  Outcome: Ongoing     Problem: Loneliness or Risk for Loneliness  Goal: Demonstrate positive use of time alone when socialization is not possible  Outcome: Ongoing     Problem: Fatigue  Goal: Verbalize increase energy and improved vitality  Outcome: Ongoing     Problem: Patient Education: Go to Patient Education Activity  Goal: Patient/Family Education  Outcome: Ongoing

## 2021-08-11 NOTE — PROGRESS NOTES
Melyssa Lobo was evaluated today and a DME order was entered for a wheeled walker because she requires this to successfully complete daily living tasks of toileting, ambulating and grooming. A wheeled walker is necessary due to the patient's unsteady gait, upper body weakness, and inability to  an ambulation device; and she can ambulate only by pushing a walker instead of a lesser assistive device such as a cane, crutch, or standard walker. The need for this equipment was discussed with the patient and she understands and is in agreement.

## 2021-08-11 NOTE — PROGRESS NOTES
[address] [address]  INPATIENT PHYSICAL THERAPY  DAILY NOTE  [facility] ORTHOPEDICS 7K - 7K-05/005-A      Time In: 1106  Time Out: 1130  Timed Code Treatment Minutes: 24 Minutes  Minutes: 24          Date: 2021  Patient Name: Cristhian Cisneros,  Gender:  female        MRN: [id number]  : 1940  ([de-identified] y.o.)     Referring Practitioner: Lynne Arechiga MD  Diagnosis: UTI  Additional Pertinent Hx: Per HPI: Pito Stock is a [de-identified] y.o. female who presents to the emergency department for evaluation of bilateral leg weakness. The patient states after her morning routine yesterday morning she noticed increased weakness when walking and is now unable to walk with her walker. She underwent low back surgery a month ago and states that since the surgery she has been having trouble walking and has had to ambulate using a walker. She also reports new paresthesias bilaterally in both lower extremities. She denies chest pain, shortness of breath, diaphoresis, diarrhea, or constipation. \"     Prior Level of Function:  Lives With: Daughter (daughter lives with pt)  Type of Home: Mobile home  Home Layout: One level  Home Access: Stairs to enter with rails (platform steps wher she can put her walker up on)  Entrance Stairs - Number of Steps: 4  Home Equipment: Rolling walker, 4 wheeled walker, Cane, [address] [facility] Shower/Tub: Walk-in shower, Shower chair with back  Bathroom Toilet: Handicap height    ADL Assistance: Independent  Ambulation Assistance: Needs assistance (pt states on her last day at [facility] she was walking independently with walker, but that was the first time, and pt got much weaker following that)  Transfer Assistance: Independent  Active : No  Additional Comments: pt states she left the SNF [facility]) last Friday and felt she was not ready to go home yet. Pt stating when she left SNF, she was able to complete her ADL tasks indep.  Pt stating she was a \"nervous wreck\" going home d/t not feeling ready and the need to change her PICC line dressing everyday. Pt was suppose to have [facility] therapy but never had a chance to initiate d/t being home less than 24 hours.     Restrictions/Precautions:  Restrictions/Precautions: Isolation  Position Activity Restriction  Spinal Precautions: No Bending, No Lifting, No Twisting  Other position/activity restrictions: hx lumbar laminectomy L2-L3 and osteomyelitis tx with abx, covid 19+ on 8/5/21       SUBJECTIVE: pt in bed on arrival and was upset about her discharge plan, she wanted to walk but adamantly refused to attempt steps even though she is stating that this is a concern for her going home, Highly encouraged her to have sister help her into the home as I don't recommend her doing steps on her own and to work on this with home health PT since she is refusing to practice them with me, also discussed having sister help with meals like she is with her daughter currently      PAIN: no number given pt cont to c/o of pain at left LE     Vitals: Vitals not assessed per clinical judgement, see nursing flowsheet    OBJECTIVE:  Bed Mobility:  Supine to Sit: Stand By Assistance  Sit to Supine: Stand By Assistance   Sit to supine with CGA at left LE   ** with HOB ~ 10 degrees and use of rail- pt upset feeling like she needs rails to help her at home we discussed the proper technique and to reach across her body for edge of mattress that a rail isn't needed at this time  Transfers:  Sit to Stand: Stand By Assistance  Stand to Sit:Stand By Assistance  Also discussed how to get up for a chair w/o armrest and how to get in/out of car   Ambulation:  Contact Guard Assistance  Distance: 60x1  Surface: Level Tile  Device:Rolling Walker  Gait Deviations:  Slow gayatri with narrow base and occ she did scissor with a near loss of balance that she self corrected - pt stated that she only has a st walker at home however she will do better and progress better with rolling walker   Highly encouraged pt to practice steps as she had concerns with this at home she refused stating that they changed her steps since she was in the hospital to small platform steps however this is something we can practice. Did educate on proper sequencing of steps and that I recommend someone hold onto her gait belt       Exercise:  None this date focused on safety with home going 2    Functional Outcome Measures: Completed  AM-PAC Inpatient Mobility Raw Score : 16  AM-PAC Inpatient T-Scale Score : 40.78    ASSESSMENT:  Assessment: Pt cont to demonstrate generalized weakness and increased pain in left LE. She does require hands on assist with gait as she is slighlty unsteady and did have a near loss of balance during gait this session. Pt would greatly benefit from cont skilled therapy to work on strength, balance, endurance and increased independence with functional mobility. Activity Tolerance:  Patient tolerance of  treatment: fair. Equipment Recommendations:Equipment Needed: No  Discharge Recommendations:    Subacute/Skilled Nursing Facility- due to being unsteady she would benefit from 24 hour hands on assist and home health     Plan: Times per week: 3-5x O  Times per day: Daily  Current Treatment Recommendations: Strengthening, Neuromuscular Re-education, Home Exercise Program, Safety Education & Training, Balance Training, Endurance Training, Patient/Caregiver Education & Training, Functional Mobility Training, Transfer Training, Gait Training, Stair training    Patient Education  Patient Education: Plan of Care    Goals:  Patient goals : go somewhere for therapy, find out what is causing leg weakness  Short term goals  Time Frame for Short term goals: by hospital discharge  Short term goal 1: Supine to/from sit, using log roll, with modified independence for ease of transfers at discharge site. Short term goal 2: Sit to/from stand with modified independence for ease of transfers at discharge site.   Short term goal 3: Improve 5x STS time to less than 14.8 seconds to reflect age appropriate norms, indicating improved functional strength and endurance. Short term goal 4: Ambulate 48' with RW and modified independence for home distance ambulation. Short term goal 5: Ascend/descend 4 steps with BHR with modified independence for entry into home. Long term goals  Time Frame for Long term goals : N/A due to short ELOS. Following session, patient left in safe position with all fall risk precautions in place.

## 2021-08-11 NOTE — CARE COORDINATION
8/11/21, 11:09 AM EDT    DISCHARGE ON [address] day: 16  Location: -05/005-A Reason for admit: UTI (urinary tract infection) [N39.0]  Weakness [R53.1]   Procedure: Has PICC - removed prior to discharge. Barriers to Discharge: Admitted per hospitalist with UTI. ID management. IV antibiotics will be completed this afternoon. Neurosurgery saw for back pain. PT/OT. N/V checks. Pain management. Daily weights. I&O. Tested Covid positive last Thursday. Droplet plus isolation  PCP: Abby Melgar MD  Readmission Risk Score: 20%  Patient Goals/Plan/Treatment Preferences: Merary CADENA has discharged to home with family today. Last dose IV antibiotics is today at 2 PM. PICC line removal.   nursing, PT/OT and aid ordered - SW to arrange [facility]. PT recommended Rolling Walker - ordered from [facility] per patient request - to be delivered to bedside today.

## 2021-08-11 NOTE — PROGRESS NOTES
Pt given discharge instructions with no visitors in room during teach. Sister was waiting in [place]. rx x 1 given. Follow up advised. All personal belongings sent with pt. To car via wheelchair. Droplet plus precautions maintained for discharge.  picc removed prior to discharge by iv team.

## 2021-08-11 NOTE — CARE COORDINATION
8/11/21, 12:44 PM EDT    DISCHARGE PLANNING EVALUATION      Spoke with [facility], made [facility] referral.  They are not sure they will be able to accept. Will discuss and get back.

## 2021-08-11 NOTE — PROGRESS NOTES
[facility]  Occupational Therapy  Daily Note  Time:    Time In: 825  Time Out: 8218   Timed Code Treatment Minutes: 45 Minutes  Minutes: 38          Date: 2021  Patient Name: John Cisse,   Gender: female      Room: [facility]005-A  MRN: [id number]  : 1940  ([de-identified] y.o.)  Referring Practitioner: Dr. Tu Kaiser. Taj  Diagnosis: UTI  Additional Pertinent Hx: [de-identified] y.o. female who presents to the emergency department for evaluation of bilateral leg weakness. The patient states after her morning routine yesterday morning she noticed increased weakness when walking and is now unable to walk with her walker. She underwent low back surgery a month ago and states that since the surgery she has been having trouble walking and has had to ambulate using a walker. She also reports new paresthesias bilaterally in both lower extremities. She denies chest pain, shortness of breath, diaphoresis, diarrhea, or constipation. \"    Restrictions/Precautions:  Restrictions/Precautions: Isolation  Position Activity Restriction  Spinal Precautions: No Bending, No Lifting, No Twisting  Other position/activity restrictions: hx lumbar laminectomy L2-L3 and osteomyelitis tx with abx, covid 19+ on 21     SUBJECTIVE: Pleasant. Anxious regarding her discharge and having PICC line removed today. PAIN: Denies    Vitals: Vitals not assessed per clinical judgement, see nursing flowsheet    COGNITION: WNL    ADL:   Feeding: with set-up. helped pt with opening up the top of her supplement drink  Grooming: Supervision. washing her hands and face while standing at the sink  Bathing: Stand By Assistance. Spongebathing while sitting on a seat at the sink  Upper Extremity Dressing: with set-up.  don/doffing a pullover pajama top  Lower Extremity Dressin [address]. donning her slippers  Toileting: Modified Independent.   no difficulty noted with wiping herself after having voided  Toilet Transfer: Minimal Assistance. pt asked for help as her L arm is unable to assist with pulling up and he grabbar was on her L side. BALANCE:  Sitting Balance:  Supervision. ADLs in the chair at the sink  Standing Balance: Stand By Assistance. washing her perineum    BED MOBILITY:  Supine to Sit: Stand By Assistance, with head of bed raised, with rail    Scooting: Stand By Assistance, X 1, with rail      TRANSFERS:  Sit to Stand:  Stand By Assistance. from the edge of bed  Stand to Sit: Stand By Assistance. to the chair    FUNCTIONAL MOBILITY:  Assistive Device: Rolling Walker  Assist Level:  Stand By Assistance. Distance: To and from bathroom  1 LOB noted briefly while turning in the bathroom but pt was able to recover spontaneously. Forward posture noted. ADDITIONAL ACTIVITIES:   Pt did some UE exercises while in sitting:  Shoulder flexion and elbow extension. Verbal cues provided for technique. 10 reps each. Exercises completed to increase pt's strength or endurance for ease of doing self care and functional mobility. ASSESSMENT:     Activity Tolerance:  Patient tolerance of  treatment: fair. Pt stood at the sink for 2 trials of 2 minutes and 40 second duration. Discharge Recommendations: [facility] recommended, Patient would benefit from continued therapy after discharge    Equipment Recommendations: Other: continue to assess  Plan: Times per week: 5x  Current Treatment Recommendations: Strengthening, Patient/Caregiver Education & Training, Balance Training, Endurance Training, Safety Education & Training, Self-Care / ADL    Patient Education  Patient Education: Precautions and posture and back protection.     Goals  Short term goals  Time Frame for Short term goals: by discharge  Short term goal 1: Pt to complete LB dressign tasks using LHAE as needed with min A  Short term goal 2: Pt to increase standing tolerance to be able to complete grooming tasks at sink with CGA and no LOB during  Short term goal 3: Pt to complete toileting and transfer iwth CGA and no cues for safety    Following session, patient left in safe position with all fall risk precautions in place.

## 2021-08-11 NOTE — FLOWSHEET NOTE
[facility] [address] PROGRESS NOTE      Patient: Shadi Mosqueda  Room #: 7K-05/005-A            YOB: 1940  Age: [de-identified] y.o. Gender: female            Admit Date & Time: 7/24/2021 12:51 PM    Assessment:  Pt is an 81y. o. female, lying in bed watching television, in 7K-05. Jignesh Delcid is pleasant, coping with latter stages of COVID-19 following an infection, and potentially may go home tomorrow. She is concerned due to an apparent insurance issue, but also lack of clarity on path-forward. She shared her support system-adult children and a sister-who will be assisting in her therapy and care once she does return home. She is thankful for her care and anticipating what home care will look like. Interventions:   provided conversation, encouragement, nurtured hope and prayer for her situation and healing. Outcomes:  Jignesh Delcid expressed gratitude for the prayer/visit. Plan:  1. Continue spiritual support if not discharged tomorrow, focusing upon encouragement for her therapy and home readjustment. Electronically signed by Homer Patel, on 8/10/2021 at 10:49 PM.  [address] [facility]  [phone]       08/10/21 2839   Encounter Summary   Services provided to: Patient   Referral/Consult From: Dorothea   Continue Visiting Yes  (8/10)   Complexity of Encounter Low   Length of Encounter 15 minutes   Spiritual/Bahai   Type Spiritual struggle   Assessment Approachable;Calm; Hopeful;Coping;Peaceful   Intervention Active listening;Explored feelings, thoughts, concerns;Prayer;Discussed illness/injury and it's impact   Outcome Expressed gratitude;Engaged in conversation;Receptive

## 2021-08-11 NOTE — CARE COORDINATION
8/11/21, 4:10 PM EDT    [address]  is not able to accept. Referral made to [facility], [facility] will follow at discharge. Call made to patient, daughter answered the phone and states she does not want [facility], she prefers [facility]. [facility] is closed for the day, will refer tomorrow and confirm with patient.

## 2021-08-11 NOTE — DISCHARGE SUMMARY
Hospital Medicine Discharge Summary      Patient Identification:   Caleb Dooley   : 1940  MRN: [id number]   Account: [de-identified]      Patient's PCP: Jaydon Clement MD    Admit Date: 2021     Discharge Date:   2021    Admitting Physician: No admitting provider for patient encounter. Discharging Nurse Practitioner: BRENDAN Yuen CNP     Discharge Diagnoses with Assessment/Plan:  1. Incidental positive COVID (+) on 2021--asymptomatic on room air   2. Physical deconditioning/debility--likely secondary to age and recent surgery; PT/OT, planning home with home health  3. Hypokalemia--resolved with replacement  4. Osteomyelitis L2-L3 status post laminectomy--appreciate neurosurgical input; on Rocephin~infectious disease will not see as pt follows with Dr Saima Appiah; appears patient to be on Rocephin for total of 6 weeks~per review of old records from [facility] the patient appears to have been started on Rocephin on  for total of 6 weeks and appears to end today ; needs follow-up with her infectious disease physician  5. Diabetes mellitus type 2, uncontrolled--on Lantus  6. Essential hypertension, uncontrolled--on Norvasc, Lopressor, Lasix Imdur, Altace  7. Chronic diastolic heart failure--on Lopressor, Lasix  8. VHD with moderate tricuspid regurgitation/mild aortic stenosis  9. Atrial fibrillation--on Eliquis, amiodarone  10. Normocytic anemia  11. Moderate malnutrition--per dietitian    The patient was seen and examined on day of discharge and this discharge summary is in conjunction with any daily progress note from day of discharge.     Hospital Course:   Caleb Dooley is a [de-identified] y.o. female admitted to [address] [place] [address] on 2021 for inability to care for herself; Per progress note dated : Corina Franc a [de-identified] y.o. female who presents to the emergency department for evaluation of bilateral leg weakness.  The patient states after her morning routine yesterday morning she noticed increased weakness when walking and is now unable to walk with her walker. She underwent low back surgery a month ago and states that since the surgery she has been having trouble walking and has had to ambulate using a walker.  She also reports new paresthesias bilaterally in both lower extremities.  She denies chest pain, shortness of breath, diaphoresis, diarrhea, or constipation. The patient has no other acute complaints at this time. MRI lumbar spine 2 weeks agp showed discitis and   osteomyelitis at L2-L3, laminectomy L2-3 with possible abscess posteriorly external to the   spinal canal, no insignificant interval changes noted overall. Dr Octavia Mi and DR Yong Germain   consulted who both agree patient does not need any surgical intervention at this time. Patient was to discharge previousy on 6 wks of antiobiotic however this was not done at   [facility]. Patient has PICC. Patient is feeling much improved, labs stable, afebrile. Patient   working with therapy. Dr Yong Germain wrote for Rx for Ceftriaxone and this will be continued for   6 wks.  Patient stable for d/c to SNF,\"     7/28--> hemodynamically stable, laboratory studies are stable except hemoglobin at 9.4 and this morning her glucose was 67     7/29--> infectious disease will not see patient as the patient follows with another ID doctor at [facility] so attempting to obtain medical records; hemodynamically stable, glucose at 97 this morning     7/30--> old records from [facility] reviewed and timeframe was established for Rocephin; hemodynamically stable; awaiting plan from  regarding placement     7/31--> hemodynamically stable, sugars past 24 hours ranged from 120-271     8/1--> hemodynamically stable, sugars past 24 hours have ranged      8/2-->did not participate with OT yesterday; hemodynamically stable; awaiting ECF pre-CERT     1/46--> patient tested positive for Covid on 8/5/2021 as this was a requirement to go to skilled nursing facility, no facility would take, patient's last IV dose of Rocephin is today, planning home with home health; she is hemodynamically stable, laboratory studies are stable, she is eating well, bowels are moving, plan is for discharge home today.             Exam:     Vitals:  Vitals:    08/10/21 1546 08/10/21 1938 08/11/21 0415 08/11/21 1030   BP: (!) 126/59 125/66 (!) 127/59 127/60   Pulse: 62 62 62 68   Resp: 16 16 16 16   Temp: 97.7 °F (36.5 °C) 97.8 °F (36.6 °C)  97.8 °F (36.6 °C)   TempSrc: Oral Oral  Oral   SpO2: 97% 98% 97% 96%   Weight:       Height:         Weight: Weight: 175 lb 14.8 oz (79.8 kg)     24 hour intake/output:    Intake/Output Summary (Last 24 hours) at 8/11/2021 1141  Last data filed at 8/11/2021 0410  Gross per 24 hour   Intake 540 ml   Output --   Net 540 ml         General appearance:  No apparent distress, appears stated age and cooperative. HEENT:  Normal cephalic, atraumatic without obvious deformity. Pupils equal, round, and reactive to light. Conjunctivae/corneas clear. Neck: Supple, with full range of motion. No jugular venous distention. Trachea midline. Respiratory:  Normal respiratory effort. Clear to auscultation, bilaterally without Rales/Wheezes/Rhonchi. Cardiovascular:  Regular rate and rhythm with normal S1/S2 without murmurs, rubs or gallops. Abdomen: Soft, non-tender, non-distended with normal bowel sounds. Musculoskeletal:  No clubbing, cyanosis or edema bilaterally. Full range of motion without deformity. Skin: Skin color, texture, turgor normal.    Neurologic:  Neurovascularly intact without any focal sensory/motor deficits. Cranial nerves: II-XII intact, grossly non-focal.  Psychiatric:  Alert and oriented, thought content appropriate  Capillary Refill: Brisk,< 3 seconds   Peripheral Pulses: +2 palpable, equal bilaterally       Labs:  For convenience and continuity at follow-up the following most recent labs are provided:      CBC:    Lab Results   Component Value Date    WBC 5.6 08/09/2021    HGB 9.2 08/09/2021    HCT 30.5 08/09/2021     08/09/2021       Renal:    Lab Results   Component Value Date     08/09/2021    K 4.2 08/09/2021    CL 99 08/09/2021    CO2 27 08/09/2021    BUN 19 08/09/2021    CREATININE 0.6 08/09/2021    CALCIUM 9.3 08/09/2021       Cardiac: No results for input(s): Aakash [place] in the last 72 hours. Significant Diagnostic Studies    Radiology:   XR CHEST 1 VIEW   Final Result   1. Right upper extremity PICC tip projects over the cavoatrial junction. **This report has been created using voice recognition software. It may contain minor errors which are inherent in voice recognition technology. **      Final report electronically signed by Dr. Doni Ross on 7/25/2021 10:30 AM      MRI LUMBAR SPINE W WO CONTRAST   Final Result       1. Previous instrumentation at L1-2.   2. Abnormal signal intensity within the L1 vertebral body and superior aspect of the L2 vertebral body consistent with osteomyelitis. 3. Abnormal signal intensity within the T12-L1 and L1-2 disc spaces, consistent with disc space infection. 4. Abnormal signal intensity and enhancement in the soft tissues dorsally at L1 and L2 consistent with postoperative and possible inflammatory changes. 5. There is mild-to-moderate canal and moderate bilateral from stenosis at L2-3 and L4-5.   6. There is moderate canal and moderate to severe bilateral foraminal stenosis at L3-4.   7. There is mild canal and moderate severe bilateral foraminal stenosis at L1-2.   8. There is mild canal and moderate bilateral foraminal stenosis at T12-L1 and L5-S1.   9. There is degenerative change involving the sacroiliac joints bilaterally. 10. There is atrophy involving the because, psoas and paraspinal muscles bilaterally. 11. There is a left hip replacement.          **This report has been created using voice recognition software. It may contain minor errors which are inherent in voice recognition technology. **      Final report electronically signed by DR Noé Menchaca on 7/25/2021 1:34 PM             Consults:     IP CONSULT TO INFECTIOUS DISEASES  IP CONSULT TO NEUROSURGERY  IP CONSULT TO SOCIAL WORK  IP CONSULT TO HOME CARE NEEDS    Disposition:    [x] Home       [] TCU       [] Rehab       [] Psych       [] SNF       [] [place]       [] Other-    Condition at Discharge: Stable    Code Status:  DNR-CCA     Pending tests at discharge: none    Patient Instructions:    Discharge lab work: none  Activity: activity as tolerated  Diet: ADULT DIET; Regular; 4 carb choices (60 gm/meal); Low Sodium (2 gm)  Adult Oral Nutrition Supplement; Diabetic Oral Supplement  Adult Oral Nutrition Supplement; Other Oral Supplement; activia yogurt and hot tea with meals per pt. request      Follow-up visits:    Kayode Rocha MD  [address] [place] [address]  [phone]    On 9/13/2021  APPT TIME AT 1:15PM , arrive 15 minutes early, please bring photo ID and medications    Olivier Burnett MD  [address] [place] [address] Road [facility]  [phone]    On 8/18/2021  at 9:30          Discharge Medications:      Rosario Streeter   Home Medication Instructions TJT:[id number]    Printed on:08/11/21 1141   Medication Information                      amiodarone (CORDARONE) 200 MG tablet  Take 1 tablet by mouth daily             amLODIPine (NORVASC) 5 MG tablet  Take 1 tablet by mouth daily             apixaban (ELIQUIS) 5 MG TABS tablet  Take 1 tablet by mouth 2 times daily             atorvastatin (LIPITOR) 40 MG tablet  Take 1 tablet by mouth daily             busPIRone (BUSPAR) 5 MG tablet  Take 1 tablet by mouth 2 times daily             citalopram (CELEXA) 10 MG tablet  Take 1 tablet by mouth daily             cyclobenzaprine (FLEXERIL) 10 MG tablet  cyclobenzaprine Cyclobenzaprine Active 10 MG PO Twice Daily as needed 12 April 8th, 2021 8:40pm 04-  [facility] ([id number])             furosemide (LASIX) 40 MG tablet  Take 40 mg by mouth See Admin Instructions Qd on M,W,F             gabapentin (NEURONTIN) 100 MG capsule  Take 1 capsule by mouth 3 times daily for 1 day. Insulin Pen Needle 31G X 8 MM MISC  1 each by Does not apply route daily             isosorbide mononitrate (IMDUR) 30 MG extended release tablet  Take 30 mg by mouth daily             LANTUS SOLOSTAR 100 UNIT/ML injection pen  Inject 30 Units into the skin daily             magnesium oxide (MAG-OX) 400 MG tablet  Take 400 mg by mouth daily             metFORMIN (GLUCOPHAGE) 1000 MG tablet  Take 1 tablet by mouth 2 times daily             metoprolol tartrate (LOPRESSOR) 25 MG tablet  Take 3 tablets by mouth 2 times daily             miconazole (MICOTIN) 2 % powder  Apply topically 2 times daily. Multiple Vitamins-Minerals (OCUVITE PRESERVISION PO)  Take by mouth daily             Multiple Vitamins-Minerals (THERAPEUTIC MULTIVITAMIN-MINERALS) tablet  Take 1 tablet by mouth daily. Omega-3 Fatty Acids (FISH OIL) 1000 MG CAPS  Take 3,000 mg by mouth 2 times daily             pantoprazole (PROTONIX) 40 MG tablet  Take 1 tablet by mouth every morning (before breakfast)             potassium chloride (MICRO-K) 10 MEQ extended release capsule  Take 10 mEq by mouth See Admin Instructions Qd on M,W,F             ramipril (ALTACE) 10 MG capsule  Take 20 mg by mouth daily                  Time Spent on discharge is more than 45 minutes in the examination, evaluation, counseling and review of medications and discharge plan. Signed: Thank you Laxmi Reardon MD for the opportunity to be involved in this patient's care.     Electronically signed by BRENDAN Rdz CNP on 8/11/2021 at 11:41 AM

## 2021-08-12 ENCOUNTER — TELEPHONE (OUTPATIENT)
Dept: FAMILY MEDICINE CLINIC | Age: 81
End: 2021-08-12

## 2021-08-12 ENCOUNTER — CARE COORDINATION (OUTPATIENT)
Dept: CASE MANAGEMENT | Age: 81
End: 2021-08-12

## 2021-08-12 DIAGNOSIS — M48.062 SPINAL STENOSIS OF LUMBAR REGION WITH NEUROGENIC CLAUDICATION: Primary | ICD-10-CM

## 2021-08-12 RX ORDER — OXYCODONE AND ACETAMINOPHEN 10; 325 MG/1; MG/1
1 TABLET ORAL EVERY 6 HOURS PRN
Qty: 28 TABLET | Refills: 0 | Status: SHIPPED | OUTPATIENT
Start: 2021-08-12 | End: 2021-08-18 | Stop reason: DRUGHIGH

## 2021-08-12 NOTE — CARE COORDINATION
Covid-19 Initial Follow Up Call    Patient contacted regarding COVID-19 risk, exposure and diagnosis. Discussed COVID-19 related testing which was available at this time. Test results were positive. Patient informed of results, if available? Yes. Care Transition Nurse contacted the family by telephone to perform post discharge assessment. Call within 2 business days of discharge: Yes. Verified name and  with family as identifiers. Provided introduction to self, and explanation of the CTN/ACM role, and reason for call due to risk factors for infection and/or exposure to COVID-19. Spoke with daughter, Jocy Ellington, who had multiple complaints. She is very upset that her mom was not sent home on any pain medication, that she was not retested for Covid to see if it was a false + since she has been vaccinated. If she is truly +, then she is exposing everyone in the home and Jocy Ellington can not distance herself from her. She is recovering from a broken hip and has home health coming in for her. She is not physically able to take care of her mom. She is using a walker or wheelchair and has no free hand to help her mom when walking. Seymour Ruvalcaba is walking with a walker, not steady, and is not able to get a glass of water or something to eat and take to the table. Jocy Ellington thinks her mom has a UTI, her urine has a strong odor and she has only urinated once since being home. She does not have any Covid symptoms. Taylor Hackett that I would have patient relations reach out to her so she could voice her concerns. Left voice message for patient relations to call me to relay Jocy Ellington concerns. Will send a message to PCP regarding possible UTI. Confirmed referral to [facility]. Adrianne Lackey with patient relations returned my call and will f/u appropriately with daughter. Symptoms reviewed with family who verbalized the following symptoms: no new symptoms and no worsening symptoms.       Due to no new or worsening symptoms encounter was not routed to provider for escalation. Discussed follow-up appointments. [facility] follow up appointment(s):   Future Appointments   Date Time Provider Filemon Sloan   8/18/2021  9:30 AM Gavi Floyd MD SR[facility] - [address] [facility]     Non-[facility] follow up appointment(s): Dr Tomy Perez 9/13    Non-face-to-face services provided:  Scheduled appointment with PCP-8/18  Scheduled appointment with [address] [place] [address] and reviewed discharge summary and/or continuity of care documents     Advance Care Planning:   Does patient have an Advance Directive:  not on file. Educated patient about risk for severe COVID-19 due to risk factors according to CDC guidelines. CTN reviewed discharge instructions, medical action plan and red flag symptoms with the family who verbalized understanding. Discussed COVID vaccination status: Yes. Education provided on COVID-19 vaccination as appropriate. Discussed exposure protocols and quarantine with CDC Guidelines. Family was given an opportunity to verbalize any questions and concerns and agrees to contact CTN or health care provider for questions related to their healthcare. Reviewed and educated family on any new and changed medications related to discharge diagnosis     Was patient discharged with a pulse oximeter? No     CTN provided contact information. Plan for follow-up call in 3-5 days based on severity of symptoms and risk factors. Challenges to be reviewed by the provider   Additional needs identified to be addressed with provider Yes  Daughter Sukhwinder Hayes (is a nurse) thinks her mom has a UTI. Has only urinated once since being home and has a strong odor. Has [facility] but hasn't seen yet.

## 2021-08-12 NOTE — TELEPHONE ENCOUNTER
Patient called stating she was in the hospital and did not receive any pain medications. She is dealing with lower back pain that goes down her right leg. She was tested for covid last week and is positive. She does not have VV capabilities but would like something to help the pain. Please advise.       [address]

## 2021-08-12 NOTE — TELEPHONE ENCOUNTER
Could you check with Dr. Richard Day about prescribing short-term for her? The reason is because he evaluated her in the nursing home a few weeks ago and prescribed for her pain. Since he knows her I would prefer bridging her for the moment until she can follow-up with Dr. Jacob Torres.

## 2021-08-12 NOTE — TELEPHONE ENCOUNTER
[facility] Transitions Initial Follow Up Call    Call within 2 business days of discharge: Yes     Patient: Jeannette Hauser Patient : 1940   MRN: [id number]  Reason for Admission: General weakness(covid positive)  Discharge Date: 21 RARS: Readmission Risk Score: 20       Spoke with: Patient daughter(on hipaa)    [facility]    Non-face-to-face services provided:  Scheduled appointment with PCP-2021       Patient is having a hard time but has been sent 1 week percocet to help her pain. Her daughter had been having a tough time on her own helping patient, home health is suppose to be stopping by and patient will see our office next Wednesday.     Follow Up  Future Appointments   Date Time Provider Filemon Sloan   2021  9:30 AM Lorie Aceves MD [facility]  [facility] - [address] [place] [address]

## 2021-08-17 ENCOUNTER — CARE COORDINATION (OUTPATIENT)
Dept: CASE MANAGEMENT | Age: 81
End: 2021-08-17

## 2021-08-17 NOTE — CARE COORDINATION
Subsequent Care Transitions Outreach Attempt - Unsuccessful     Follow up Transition Call - No answer Left Voicemail message with writers callback number. Patient: Melyssa Lobo Patient : 1940 MRN: [id number]    Last Discharge [facility]       Complaint Diagnosis Description Type Department Provider    21 Fatigue General weakness . .. ED to Hosp-Admission (Discharged) (ADMITTED) ISI 7K Merary Archer, APRN - CNP; Se. ..           Discharge Diagnosis :     Noted following upcoming appointments from discharge chart review:       [facility] follow up appointment(s):   Future Appointments   Date Time Provider Filemon Sloan   2021  9:30 AM Ana Gallardo MD SR[facility] - Diomedes Fowler RN  CARE TRANSITIONS NURSE

## 2021-08-17 NOTE — CARE COORDINATION
Daughter Leno Sutton calls back, introduce self and reason for call states \" well she ain't doing for sh*t, but I am in the bathroom I was walking down the pino when you called. \" \"let me get back somewhere to sit and I will call you back. \"    Jamaicalauryn Camacho [phone]  Care Transitions Nurse

## 2021-08-17 NOTE — CARE COORDINATION
[facility] Transitions Follow Up Call    2021    Patient: Rosette Brown  Patient : 1940   MRN: [id number]  Reason for Admission:    Discharge Date: 21 RARS: Readmission Risk Score: 20    Spoke with: daughter Alvina Contreras, still fees like her mother should not be at home. Alvina Contreras is recovering from a broken hip and can't be up and about with her mom at all times. Prior to April patient was independent, the daughter Alvina Contreras lives with her mother due to health issues, broke her ankle years ago and can not work and now a broken hip. She states her mom has had therapies out all week \" admitting her\" but waiting for insurance to approve Home health aide and not entirely sure how that conversation ended since she herself has home health and an aide and she as there to get bathed when they were discussing. Call to [facility] after daughter states she spoke with them and they were going to send a nurse out today because daughter thought the patient was acting funny. They will be out after one more patient. They are still awaiting approval of home health aide from insurance. Care Transitions Follow Up Call    Needs to be reviewed by the provider   Additional needs identified to be addressed with provider: No  none             Method of communication with provider : none      Care Transition Nurse (CTN) contacted the family by telephone to follow up after admission. Verified name and  with family as identifiers. Addressed changes since last contact: home health care-[facility], coming out today, PT-assessed patient, OT-assessed patient and SLP-assessed patient  Discussed follow-up appointments. If no appointment was previously scheduled, appointment scheduling offered: No.   Is follow up appointment scheduled within 7 days of discharge? Yes. Advance Care Planning:   Does patient have an Advance Directive: reviewed and needs to be updated.      CTN reviewed discharge instructions, medical action plan and red flags with family and discussed any barriers to care and/or understanding of plan of care after discharge. Discussed appropriate site of care based on symptoms and resources available to patient including: PCP and Home health. The family agrees to contact the PCP office for questions related to their healthcare. Patients top risk factors for readmission: ineffective coping and support system  Interventions to address risk factors: Communication with home health agencies or other community services the patient is currently using-[facility]      Non-[facility] follow up appointment(s):      CTN provided contact information for future needs. Plan for follow-up call in 5-7 days based on severity of symptoms and risk factors. Plan for next call: symptom management-weakness and follow up appointment-PCP- son was driving up from [place] to take the patient     Care Transitions Subsequent and Final Call    Subsequent and Final Calls  Do you have any ongoing symptoms?: Yes  Onset of Patient-reported symptoms: In the past 7 days  Patient-reported symptoms: Weakness, Fatigue  Interventions for patient-reported symptoms: Notified Home Care  Have your medications changed?: No  Do you have any questions related to your medications?: No  Do you currently have any active services?: Yes  Are you currently active with any services?: Home Health  Do you have any needs or concerns that I can assist you with?: No  Identified Barriers: Other  Care Transitions Interventions  Other Interventions:            Follow Up  Future Appointments   Date Time Provider Filemon Sloan   8/18/2021  9:30 AM Cindy Ceballos MD [facility] - Sadi Knox RN

## 2021-08-18 ENCOUNTER — TELEPHONE (OUTPATIENT)
Dept: FAMILY MEDICINE CLINIC | Age: 81
End: 2021-08-18

## 2021-08-18 ENCOUNTER — OFFICE VISIT (OUTPATIENT)
Dept: FAMILY MEDICINE CLINIC | Age: 81
End: 2021-08-18
Payer: MEDICARE

## 2021-08-18 VITALS
HEIGHT: 61 IN | OXYGEN SATURATION: 90 % | BODY MASS INDEX: 31.91 KG/M2 | SYSTOLIC BLOOD PRESSURE: 122 MMHG | HEART RATE: 92 BPM | RESPIRATION RATE: 20 BRPM | WEIGHT: 169 LBS | TEMPERATURE: 98.5 F | DIASTOLIC BLOOD PRESSURE: 64 MMHG

## 2021-08-18 DIAGNOSIS — M46.26 OSTEOMYELITIS OF LUMBAR SPINE (HCC): ICD-10-CM

## 2021-08-18 DIAGNOSIS — Z98.890 HISTORY OF LUMBOSACRAL SPINE SURGERY: Primary | ICD-10-CM

## 2021-08-18 DIAGNOSIS — M48.062 SPINAL STENOSIS OF LUMBAR REGION WITH NEUROGENIC CLAUDICATION: ICD-10-CM

## 2021-08-18 DIAGNOSIS — R53.1 WEAKNESS: ICD-10-CM

## 2021-08-18 DIAGNOSIS — Z79.4 TYPE 2 DIABETES MELLITUS WITHOUT COMPLICATION, WITH LONG-TERM CURRENT USE OF INSULIN (HCC): ICD-10-CM

## 2021-08-18 DIAGNOSIS — I48.11 LONGSTANDING PERSISTENT ATRIAL FIBRILLATION (HCC): ICD-10-CM

## 2021-08-18 DIAGNOSIS — U07.1 COVID-19: ICD-10-CM

## 2021-08-18 DIAGNOSIS — E11.9 TYPE 2 DIABETES MELLITUS WITHOUT COMPLICATION, WITH LONG-TERM CURRENT USE OF INSULIN (HCC): ICD-10-CM

## 2021-08-18 PROCEDURE — 1111F DSCHRG MED/CURRENT MED MERGE: CPT | Performed by: FAMILY MEDICINE

## 2021-08-18 PROCEDURE — 99495 TRANSJ CARE MGMT MOD F2F 14D: CPT | Performed by: FAMILY MEDICINE

## 2021-08-18 RX ORDER — BUSPIRONE HYDROCHLORIDE 5 MG/1
5 TABLET ORAL 2 TIMES DAILY
Qty: 180 TABLET | Refills: 1 | Status: SHIPPED | OUTPATIENT
Start: 2021-08-18 | End: 2022-05-24 | Stop reason: SDUPTHER

## 2021-08-18 RX ORDER — RAMIPRIL 10 MG/1
10 CAPSULE ORAL DAILY
Qty: 30 CAPSULE | Refills: 0 | Status: SHIPPED
Start: 2021-08-18 | End: 2021-12-09 | Stop reason: SDUPTHER

## 2021-08-18 RX ORDER — OXYCODONE AND ACETAMINOPHEN 10; 325 MG/1; MG/1
0.5 TABLET ORAL EVERY 6 HOURS PRN
Qty: 28 TABLET | Refills: 0 | Status: SHIPPED
Start: 2021-08-18 | End: 2021-08-22

## 2021-08-18 RX ORDER — CITALOPRAM 10 MG/1
10 TABLET ORAL DAILY
Qty: 90 TABLET | Refills: 1 | Status: SHIPPED | OUTPATIENT
Start: 2021-08-18 | End: 2022-05-10 | Stop reason: SDUPTHER

## 2021-08-18 RX ORDER — BACLOFEN 10 MG/1
10 TABLET ORAL 3 TIMES DAILY PRN
COMMUNITY
End: 2021-08-18

## 2021-08-18 RX ORDER — PANTOPRAZOLE SODIUM 40 MG/1
40 TABLET, DELAYED RELEASE ORAL
Qty: 90 TABLET | Refills: 1 | Status: SHIPPED | OUTPATIENT
Start: 2021-08-18 | End: 2022-10-03 | Stop reason: SDUPTHER

## 2021-08-18 RX ORDER — POTASSIUM CHLORIDE 750 MG/1
10 CAPSULE, EXTENDED RELEASE ORAL SEE ADMIN INSTRUCTIONS
Qty: 60 CAPSULE | Refills: 1 | Status: SHIPPED | OUTPATIENT
Start: 2021-08-18

## 2021-08-18 NOTE — TELEPHONE ENCOUNTER
----- Message from Saira Alford sent at 8/18/2021 11:27 AM EDT -----  Subject: Message to Provider    QUESTIONS  Information for Provider? Pts son states they were just in the office and   they need a letter for the post office that states that her mail needs to   be delivered to her and not in her mailbox. Letter can be mailed and then   son will take to post office. ---------------------------------------------------------------------------  --------------  Khalida FUENTES  What is the best way for the office to contact you? OK to leave message on   voicemail  Preferred Call Back Phone Number? [phone]  ---------------------------------------------------------------------------  --------------  SCRIPT ANSWERS  Relationship to Patient?  Self

## 2021-08-18 NOTE — LETTER
[address]  [address]  [facility] [address]  Phone: [phone]  Fax: [phone]    Rose Gaffney MD        August 18, 2021     Patient: Nyla Delgado   YOB: 1940   Date of Visit: 8/18/2021       To Whom It May Concern: It is my medical opinion that Patricia Parham needs her mailed delivered to her door instead of her mailbox due to medical conditions. If you have any questions or concerns, please don't hesitate to call.     Sincerely,        Rose Gaffney MD

## 2021-08-23 ENCOUNTER — CARE COORDINATION (OUTPATIENT)
Dept: CASE MANAGEMENT | Age: 81
End: 2021-08-23

## 2021-08-23 PROBLEM — N39.0 UTI (URINARY TRACT INFECTION): Status: RESOLVED | Noted: 2021-07-24 | Resolved: 2021-08-23

## 2021-08-23 NOTE — CARE COORDINATION
Care Transitions Outreach Attempt    Call within 2 business days of discharge: No   Attempted to reach patient for transitions of care follow up. Unable to reach patient. Patient: Cristhian Cisneros Patient : 1940 MRN: [id number]    Last Discharge [facility]       Complaint Diagnosis Description Type Department Provider    21 Fatigue General weakness . .. ED to Hosp-Admission (Discharged) (ADMITTED) ISI 7K Merary Lucas, APRN - CNP; Se. .. Attempted to contact patient for Transition Subsequent call. No answer - Unable to leave message. Will continue to follow. Chancy Sacks, LPN    [phone]  Una Thacker / Patricio Funk Coordinator    Was this an external facility discharge? No Discharge Facility:     Noted following upcoming appointments from discharge chart review:   [facility] follow up appointment(s): No future appointments.   Non-[facility] follow up appointment(s):

## 2021-08-24 ENCOUNTER — TELEPHONE (OUTPATIENT)
Dept: FAMILY MEDICINE CLINIC | Age: 81
End: 2021-08-24

## 2021-08-24 RX ORDER — ONDANSETRON 4 MG/1
4 TABLET, FILM COATED ORAL 3 TIMES DAILY PRN
Qty: 30 TABLET | Refills: 0 | Status: SHIPPED | OUTPATIENT
Start: 2021-08-24 | End: 2021-10-06 | Stop reason: SDUPTHER

## 2021-08-24 NOTE — TELEPHONE ENCOUNTER
Patient called stating she has been having nausea without vomiting and some dry heaving but is unable to vomit. She still has her appetite and is staying hydrated. She was last seen 08/18/2021 and did not have this going on then. She states she feels like this all day, no specific time of the day and no other symptoms. Please advise.

## 2021-08-27 ENCOUNTER — TELEPHONE (OUTPATIENT)
Dept: PHARMACY | Facility: CLINIC | Age: 81
End: 2021-08-27

## 2021-08-27 NOTE — TELEPHONE ENCOUNTER
CLINICAL PHARMACY NOTE  Post-Discharge Transitions of Care (ALESSANDRO)    Patient appears to have been discharged from [facility] on 8/11/21. Patient not found in Outcomes MTM. Please follow-up with patient to review medications.       30 days since discharge = 9/10/21     Kirk Aldrich, Pharmacy    [address]   Department, toll free [phone], option 1

## 2021-08-29 NOTE — PROGRESS NOTES
Post-Discharge Transitional Care Management Services or Hospital Follow Up      Loretta Lowery   YOB: 1940    Date of Office Visit:  8/18/2021  Date of Hospital Admission: 7/24/21  Date of Hospital Discharge: 8/11/21  Risk of hospital readmission (high >=14%.  Medium >=10%) :Readmission Risk Score: 20      Care management risk score Rising risk (score 2-5) and Complex Care (Scores >=6): 1     Non face to face  following discharge, date last encounter closed (first attempt may have been earlier): 8/23/2021  3:21 PM    Call initiated 2 business days of discharge: No    Patient Active Problem List   Diagnosis    Microscopic hematuria    Cardiac murmur    Type 2 diabetes mellitus without complication, with long-term current use of insulin ([address]    Essential hypertension    Anxiety    Closed fracture of left distal radius and ulna, initial encounter    Osteomyelitis of lumbar spine ([address]    Spinal stenosis of lumbar region with neurogenic claudication    Discitis of lumbar region    Longstanding persistent atrial fibrillation (HCC)    Weakness    Moderate malnutrition ([address]       Allergies   Allergen Reactions    Trimethoprim Hives    Sulfa Antibiotics Hives       Medications listed as ordered at the time of discharge from [facility] Medication Instructions MARBELLA:    Printed on:08/29/21 1053   Medication Information                      amiodarone (CORDARONE) 200 MG tablet  Take 1 tablet by mouth daily             amLODIPine (NORVASC) 5 MG tablet  Take 1 tablet by mouth daily             apixaban (ELIQUIS) 5 MG TABS tablet  Take 1 tablet by mouth 2 times daily             atorvastatin (LIPITOR) 40 MG tablet  Take 1 tablet by mouth daily             busPIRone (BUSPAR) 5 MG tablet  Take 1 tablet by mouth 2 times daily             citalopram (CELEXA) 10 MG tablet  Take 1 tablet by mouth daily             furosemide (LASIX) 40 MG tablet  Take 40 mg by mouth See Admin Instructions Qd on M,W,F             gabapentin (NEURONTIN) 100 MG capsule  Take 1 capsule by mouth 3 times daily for 1 day. Insulin Pen Needle 31G X 8 MM MISC  1 each by Does not apply route daily             isosorbide mononitrate (IMDUR) 30 MG extended release tablet  Take 30 mg by mouth daily             LANTUS SOLOSTAR 100 UNIT/ML injection pen  Inject 30 Units into the skin daily             magnesium oxide (MAG-OX) 400 MG tablet  Take 400 mg by mouth daily             metFORMIN (GLUCOPHAGE) 1000 MG tablet  Take 1 tablet by mouth 2 times daily             metoprolol tartrate (LOPRESSOR) 25 MG tablet  Take 3 tablets by mouth 2 times daily             miconazole (MICOTIN) 2 % powder  Apply topically 2 times daily. Multiple Vitamins-Minerals (OCUVITE PRESERVISION PO)  Take by mouth daily             Multiple Vitamins-Minerals (THERAPEUTIC MULTIVITAMIN-MINERALS) tablet  Take 1 tablet by mouth daily.              Omega-3 Fatty Acids (FISH OIL) 1000 MG CAPS  Take 3,000 mg by mouth 2 times daily             ondansetron (ZOFRAN) 4 MG tablet  Take 1 tablet by mouth 3 times daily as needed for Nausea or Vomiting             pantoprazole (PROTONIX) 40 MG tablet  Take 1 tablet by mouth every morning (before breakfast)             potassium chloride (MICRO-K) 10 MEQ extended release capsule  Take 1 capsule by mouth See Admin Instructions Qd on M,W,F             ramipril (ALTACE) 10 MG capsule  Take 1 capsule by mouth daily                   Medications marked \"taking\" at this time  Outpatient Medications Marked as Taking for the 8/18/21 encounter (Office Visit) with Faye Leary MD   Medication Sig Dispense Refill    apixaban (ELIQUIS) 5 MG TABS tablet Take 1 tablet by mouth 2 times daily 180 tablet 1    potassium chloride (MICRO-K) 10 MEQ extended release capsule Take 1 capsule by mouth See Admin Instructions Qd on M,W,F 60 capsule 1    busPIRone (BUSPAR) 5 MG tablet Take 1 tablet by mouth 2 times daily 180 tablet 1    pantoprazole (PROTONIX) 40 MG tablet Take 1 tablet by mouth every morning (before breakfast) 90 tablet 1    citalopram (CELEXA) 10 MG tablet Take 1 tablet by mouth daily 90 tablet 1    metFORMIN (GLUCOPHAGE) 1000 MG tablet Take 1 tablet by mouth 2 times daily 180 tablet 3    [] oxyCODONE-acetaminophen (PERCOCET)  MG per tablet Take 0.5 tablets by mouth every 6 hours as needed for Pain for up to 4 days. Intended supply: 30 days 28 tablet 0    ramipril (ALTACE) 10 MG capsule Take 1 capsule by mouth daily 30 capsule 0    metoprolol tartrate (LOPRESSOR) 25 MG tablet Take 3 tablets by mouth 2 times daily 180 tablet 5    gabapentin (NEURONTIN) 100 MG capsule Take 1 capsule by mouth 3 times daily for 1 day. 90 capsule 0    amiodarone (CORDARONE) 200 MG tablet Take 1 tablet by mouth daily      furosemide (LASIX) 40 MG tablet Take 40 mg by mouth See Admin Instructions Qd on M,W,F      isosorbide mononitrate (IMDUR) 30 MG extended release tablet Take 30 mg by mouth daily      amLODIPine (NORVASC) 5 MG tablet Take 1 tablet by mouth daily      LANTUS SOLOSTAR 100 UNIT/ML injection pen Inject 30 Units into the skin daily 15 pen 3    Insulin Pen Needle 31G X 8 MM MISC 1 each by Does not apply route daily 100 each 3    Omega-3 Fatty Acids (FISH OIL) 1000 MG CAPS Take 3,000 mg by mouth 2 times daily      Multiple Vitamins-Minerals (OCUVITE PRESERVISION PO) Take by mouth daily      Multiple Vitamins-Minerals (THERAPEUTIC MULTIVITAMIN-MINERALS) tablet Take 1 tablet by mouth daily. Medications patient taking as of now reconciled against medications ordered at time of hospital discharge: Yes    Chief Complaint   Patient presents with    [facility] [address]     STR - Incidental COVID Positive , Asymptomatic, Osteomyelitis L2-L3 s/p Laminectomy, Hypokalemia, Deconditioning.      Discuss Medications     daughter wants to increase gabapentin, son is concerned over addiction to percocet. See daughter's note    Medication Refill       History of Present illness - Follow up of Hospital diagnosis(es):    Diagnosis Orders   1. History of lumbosacral spine surgery     2. Osteomyelitis of lumbar spine (HCC)  CBC Auto Differential   3. Type 2 diabetes mellitus without complication, with long-term current use of insulin (HCC)  Comprehensive Metabolic Panel    Hemoglobin A1C   4. Longstanding persistent atrial fibrillation ([address]     5. Weakness     6. COVID-19     7. Spinal stenosis of lumbar region with neurogenic claudication  oxyCODONE-acetaminophen (PERCOCET)  MG per tablet         Inpatient course: Discharge summary reviewed- see chart. Interval history/Current status: stable    A comprehensive review of systems was negative except for what was noted in the HPI. Vitals:    08/18/21 0950   BP: 122/64   Pulse: 92   Resp: 20   Temp: 98.5 °F (36.9 °C)   TempSrc: Oral   SpO2: 90%   Weight: 169 lb (76.7 kg)   Height: 5' 1.25\" (1.556 m)     Body mass index is 31.67 kg/m².    Wt Readings from Last 3 Encounters:   08/18/21 169 lb (76.7 kg)   08/01/21 175 lb 14.8 oz (79.8 kg)   07/09/21 180 lb (81.6 kg)     BP Readings from Last 3 Encounters:   08/18/21 122/64   08/11/21 127/60   07/09/21 (!) 179/95        Physical Exam:  General Appearance: alert and oriented to person, place and time, well developed and well- nourished, in no acute distress  Skin: warm and dry, no rash or erythema  Head: normocephalic and atraumatic  Eyes: pupils equal, round, and reactive to light, extraocular eye movements intact, conjunctivae normal  ENT: tympanic membrane, external ear and ear canal normal bilaterally, nose without deformity, nasal mucosa and turbinates normal without polyps  Neck: supple and non-tender without mass, no thyromegaly or thyroid nodules, no cervical lymphadenopathy  Pulmonary/Chest: clear to auscultation bilaterally- no wheezes, rales or rhonchi, normal air movement, no respiratory distress  Cardiovascular: normal rate, regular rhythm, normal S1 and S2, no murmurs, rubs, clicks, or gallops, distal pulses intact, no carotid bruits  Abdomen: soft, non-tender, non-distended, normal bowel sounds, no masses or organomegaly  Extremities: no cyanosis, clubbing or edema  Musculoskeletal: normal range of motion, no joint swelling, deformity or tenderness  Neurologic: reflexes normal and symmetric, no cranial nerve deficit, gait, coordination and speech normal    Assessment/Plan:   Diagnosis Orders   1. History of lumbosacral spine surgery     2. Osteomyelitis of lumbar spine (HCC)  CBC Auto Differential   3. Type 2 diabetes mellitus without complication, with long-term current use of insulin ([facility])  Comprehensive Metabolic Panel    Hemoglobin A1C   4. Longstanding persistent atrial fibrillation ([address]     5. Weakness     6. COVID-19     7.  Spinal stenosis of lumbar region with neurogenic claudication  oxyCODONE-acetaminophen (PERCOCET)  MG per tablet           Medical Decision Making: moderate complexity

## 2021-08-30 ENCOUNTER — TELEPHONE (OUTPATIENT)
Dept: FAMILY MEDICINE CLINIC | Age: 81
End: 2021-08-30

## 2021-08-30 ENCOUNTER — CARE COORDINATION (OUTPATIENT)
Dept: CASE MANAGEMENT | Age: 81
End: 2021-08-30

## 2021-08-30 DIAGNOSIS — Z98.890 HISTORY OF LUMBOSACRAL SPINE SURGERY: Primary | ICD-10-CM

## 2021-08-30 RX ORDER — TRAMADOL HYDROCHLORIDE 50 MG/1
50 TABLET ORAL EVERY 8 HOURS PRN
Qty: 21 TABLET | Refills: 0 | Status: SHIPPED | OUTPATIENT
Start: 2021-08-30 | End: 2021-09-06

## 2021-08-30 NOTE — TELEPHONE ENCOUNTER
Depends how she is progressing. If pain worsens or fails to improve, it can be adjusted, but risk of side effects increases with dosing.

## 2021-08-30 NOTE — TELEPHONE ENCOUNTER
----- Message from Jane Hyman sent at 8/30/2021  4:13 PM EDT -----  Subject: Refill Request    QUESTIONS  Name of Medication? oxyCODONE-acetaminophen (PERCOCET)  MG per   tablet  Patient-reported dosage and instructions? Take 0.5 tablets by mouth every   6 hours as needed for Pain for up to 4 days. Intended supply? 30 days  How many days do you have left? 0  Preferred Pharmacy? [facility]  Pharmacy phone number (if available)? [phone]  Additional Information for Provider? Daughter has noticed that pt is still   in pain and would like to have RX refilled. ---------------------------------------------------------------------------  --------------  Krish Hinders INFO  What is the best way for the office to contact you? OK to leave message on   voicemail  Preferred Call Back Phone Number?  [phone]

## 2021-08-30 NOTE — TELEPHONE ENCOUNTER
Patient daughter mikala notified. She also stated on a side note that the home health nurse recommended to ask if the Gabapentin being increase would help the patient at all. She wanted advise if this would be a good idea. Please advise.

## 2021-08-30 NOTE — TELEPHONE ENCOUNTER
Noted patient was seen by PCP for hospital f/u OV on 8/18/21 and 1111F has been added. Will sign off.      Leanne Bamberger, PharmD, [address] // Department, toll free [phone], option 1      For Zenon Mario in place:  No   Time Spent (min): 5

## 2021-08-30 NOTE — CARE COORDINATION
Covid-19 Subsequent Follow Up Call    Date/Time:  8/30/2021 3:36 PM  Attempted to reach family by telephone. Call within 2 business days of discharge: Yes Left HIPPA compliant message requesting a return call. If no return call, CTN will sign off-2nd attempt.

## 2021-09-07 ENCOUNTER — TELEPHONE (OUTPATIENT)
Dept: FAMILY MEDICINE CLINIC | Age: 81
End: 2021-09-07

## 2021-09-07 DIAGNOSIS — Z98.890 HISTORY OF LUMBOSACRAL SPINE SURGERY: Primary | ICD-10-CM

## 2021-09-10 RX ORDER — HYDROCODONE BITARTRATE AND ACETAMINOPHEN 5; 325 MG/1; MG/1
1 TABLET ORAL EVERY 6 HOURS PRN
Qty: 28 TABLET | Refills: 0 | Status: SHIPPED | OUTPATIENT
Start: 2021-09-10 | End: 2021-09-17

## 2021-09-10 NOTE — TELEPHONE ENCOUNTER
Daughter says she has been taking 2 Tylenol approx tid. Says she was screaming out in pain last night and daughter had some Norco and gave her one and she has been sleeping since. Requesting to either go back to Percocet or try Norco. I discussed with daughter their concerns regarding addiction and I advised daughter that as long as she takes it as prescribed, they do not need to be concerned about addiction.      Call Elias back

## 2021-09-13 RX ORDER — GABAPENTIN 100 MG/1
100 CAPSULE ORAL 3 TIMES DAILY
Qty: 270 CAPSULE | Refills: 1 | Status: SHIPPED | OUTPATIENT
Start: 2021-09-13 | End: 2022-02-07 | Stop reason: SDUPTHER

## 2021-09-13 RX ORDER — AMLODIPINE BESYLATE 5 MG/1
5 TABLET ORAL DAILY
Qty: 90 TABLET | Refills: 1 | Status: SHIPPED | OUTPATIENT
Start: 2021-09-13 | End: 2022-05-10 | Stop reason: SDUPTHER

## 2021-09-14 LAB
A/G RATIO: 1.4 (ref 1.5–2.5)
ABSOLUTE BASO #: 0 /CMM (ref 0–200)
ABSOLUTE EOS #: 100 /CMM (ref 0–500)
ABSOLUTE LYMPH #: 2100 /CMM (ref 1000–4800)
ABSOLUTE MONO #: 400 /CMM (ref 0–800)
ABSOLUTE NEUT #: 3900 /CMM (ref 1800–7700)
ALBUMIN SERPL-MCNC: 4 GM/DL (ref 3.5–5)
ALP BLD-CCNC: 93 IU/L (ref 39–118)
ALT SERPL-CCNC: 6 IU/L (ref 10–40)
ANION GAP SERPL CALCULATED.3IONS-SCNC: 11 MMOL/L (ref 4–12)
AST SERPL-CCNC: 11 IU/L (ref 15–41)
BASOPHILS RELATIVE PERCENT: 0.7 % (ref 0–2)
BILIRUB SERPL-MCNC: 0.3 MG/DL (ref 0.2–1)
BUN BLDV-MCNC: 21 MG/DL (ref 7–20)
CALCIUM SERPL-MCNC: 9.1 MG/DL (ref 8.8–10.5)
CHLORIDE BLD-SCNC: 100 MEQ/L (ref 101–111)
CO2: 26 MEQ/L (ref 21–32)
CREAT SERPL-MCNC: 0.76 MG/DL (ref 0.6–1.3)
CREATININE CLEARANCE: >60
EOSINOPHILS RELATIVE PERCENT: 1.4 % (ref 0–6)
ESTIMATED AVERAGE GLUCOSE: 143 MG/DL
GLUCOSE: 92 MG/DL (ref 70–110)
HBA1C MFR BLD: 6.6 % (ref 4.4–6.4)
HCT VFR BLD CALC: 32.3 % (ref 35–44)
HEMOGLOBIN: 10.1 GM/DL (ref 12–15)
HYPOCHROMIA: ABNORMAL
LYMPHOCYTES RELATIVE PERCENT: 32.1 % (ref 15–45)
MCH RBC QN AUTO: 25.5 PG (ref 27.5–33)
MCHC RBC AUTO-ENTMCNC: 31.3 GM/DL (ref 33–36)
MCV RBC AUTO: 81.5 CU MIC (ref 80–97)
MONOCYTES RELATIVE PERCENT: 5.9 % (ref 2–10)
NEUTROPHILS RELATIVE PERCENT: 59.9 % (ref 40–70)
NUCLEATED RBCS: 0 /100 WBC
PDW BLD-RTO: 17.6 % (ref 12–16)
PLATELET # BLD: 360 TH/CMM (ref 150–400)
POTASSIUM SERPL-SCNC: 3.8 MEQ/L (ref 3.6–5)
RBC # BLD: 3.96 MIL/CMM (ref 4–5.1)
SODIUM BLD-SCNC: 137 MEQ/L (ref 135–145)
TOTAL PROTEIN: 6.8 G/DL (ref 6.2–8)
WBC # BLD: 6.5 TH/CMM (ref 4.4–10.5)

## 2021-09-15 ENCOUNTER — TELEPHONE (OUTPATIENT)
Dept: FAMILY MEDICINE CLINIC | Age: 81
End: 2021-09-15

## 2021-09-15 NOTE — TELEPHONE ENCOUNTER
----- Message from Ferdinand Gardner MD sent at 9/15/2021  6:46 AM EDT -----  Results are ok. Please let the patient know.

## 2021-10-06 RX ORDER — ONDANSETRON 4 MG/1
4 TABLET, FILM COATED ORAL 3 TIMES DAILY PRN
Qty: 30 TABLET | Refills: 0 | Status: SHIPPED | OUTPATIENT
Start: 2021-10-06 | End: 2022-10-05 | Stop reason: ALTCHOICE

## 2021-10-06 RX ORDER — INSULIN GLARGINE 100 [IU]/ML
30 INJECTION, SOLUTION SUBCUTANEOUS DAILY
Qty: 15 PEN | Refills: 3 | Status: SHIPPED | OUTPATIENT
Start: 2021-10-06 | End: 2022-07-20

## 2021-10-06 NOTE — TELEPHONE ENCOUNTER
----- Message from [facility] sent at 10/6/2021  3:34 PM EDT -----  Subject: Refill Request    QUESTIONS  Name of Medication? LANTUS SOLOSTAR 100 UNIT/ML injection pen  Patient-reported dosage and instructions? 100 unit/ ML once in the morning   say she takes up to 30  How many days do you have left? 1  Preferred Pharmacy? [facility] phone number (if available)? [phone]  ---------------------------------------------------------------------------  --------------,  Name of Medication? ondansetron (ZOFRAN) 4 MG tablet  Patient-reported dosage and instructions? 4 MG prn  How many days do you have left? 0  Preferred Pharmacy? [facility] phone number (if available)? [phone]  ---------------------------------------------------------------------------  --------------  CALL BACK INFO  What is the best way for the office to contact you? OK to leave message on   voicemail  Preferred Call Back Phone Number?  [phone]

## 2021-12-09 RX ORDER — RAMIPRIL 10 MG/1
10 CAPSULE ORAL DAILY
Qty: 30 CAPSULE | Refills: 0 | Status: SHIPPED | OUTPATIENT
Start: 2021-12-09 | End: 2022-01-21 | Stop reason: SDUPTHER

## 2022-01-21 ENCOUNTER — TELEPHONE (OUTPATIENT)
Dept: FAMILY MEDICINE CLINIC | Age: 82
End: 2022-01-21

## 2022-01-21 RX ORDER — RAMIPRIL 10 MG/1
10 CAPSULE ORAL DAILY
Qty: 30 CAPSULE | Refills: 0 | Status: SHIPPED | OUTPATIENT
Start: 2022-01-21 | End: 2022-02-23 | Stop reason: SDUPTHER

## 2022-01-21 NOTE — TELEPHONE ENCOUNTER
----- Message from Ellis Yoder sent at 1/21/2022 12:08 PM EST -----  Subject: Appointment Request    Reason for Call: Routine Medicare AWV    QUESTIONS  Type of Appointment? Established Patient  Reason for appointment request? No appointments available during search  Additional Information for Provider? Pt is calling to reschedule appt . She   is requesting the appt to be on a wed.  ---------------------------------------------------------------------------  --------------  Gydget  What is the best way for the office to contact you? OK to leave message on   voicemail  Preferred Call Back Phone Number? [phone]  ---------------------------------------------------------------------------  --------------  SCRIPT ANSWERS  Relationship to Patient? Self  Have your symptoms changed? No  (If the patient has Medicare as their primary insurance coverage ask this   question) Are you requesting a Medicare Annual Wellness Visit? Yes   (Is the patient requesting a pap smear with their physical exam?)? No  (Is the patient requesting their annual physical and does not need PAP or   AWV per above?)? No  Have you been diagnosed with, awaiting test results for, or told that you   are suspected of having COVID-19 (Coronavirus)? (If patient has tested   negative or was tested as a requirement for work, school, or travel and   not based on symptoms, answer no)? No  Within the past two weeks have you developed any of the following symptoms   (answer no if symptoms have been present longer than 2 weeks or began   more than 2 weeks ago)? Fever or Chills, Cough, Shortness of breath or   difficulty breathing, Loss of taste or smell, Sore throat, Nasal   congestion, Sneezing or runny nose, Fatigue or generalized body aches   (answer no if pain is specific to a body part e.g. back pain), Diarrhea,   Headache? No  Have you had close contact with someone with COVID-19 in the last 14 days?    No  (Service Expert  click yes below to proceed with [employer] As Usual   Scheduling)?  Yes

## 2022-01-21 NOTE — TELEPHONE ENCOUNTER
[facility] Transitions Initial Follow Up Call    Call within 2 business days of discharge: Yes     Patient: Cayden Quispe Patient : 1940   MRN: [id number]  Reason for Admission: C1 fracture  Discharge Date: 21 RARS: Readmission Risk Score: 20       Spoke with: TOSIN for call back    Discharge department/facility: [facility]    Non-face-to-face services provided:       Follow Up  Future Appointments   Date Time Provider Filemon Sloan   2022  9:45 AM Harish Booth MD [facility] [address] [place]

## 2022-01-25 ENCOUNTER — TELEPHONE (OUTPATIENT)
Dept: FAMILY MEDICINE CLINIC | Age: 82
End: 2022-01-25

## 2022-01-25 NOTE — TELEPHONE ENCOUNTER
Notified that we do not have the booster shot, but she was given information regarding going to local pharmacy or hospital for this.

## 2022-02-07 RX ORDER — GABAPENTIN 100 MG/1
100 CAPSULE ORAL 3 TIMES DAILY
Qty: 270 CAPSULE | Refills: 1 | Status: SHIPPED | OUTPATIENT
Start: 2022-02-07 | End: 2022-05-31 | Stop reason: SDUPTHER

## 2022-02-07 RX ORDER — METFORMIN HYDROCHLORIDE 500 MG/1
1 TABLET, EXTENDED RELEASE ORAL 2 TIMES DAILY
COMMUNITY
Start: 2022-01-20 | End: 2022-07-20

## 2022-02-23 RX ORDER — RAMIPRIL 10 MG/1
10 CAPSULE ORAL DAILY
Qty: 30 CAPSULE | Refills: 0 | Status: SHIPPED | OUTPATIENT
Start: 2022-02-23 | End: 2022-03-07 | Stop reason: SDUPTHER

## 2022-02-23 NOTE — TELEPHONE ENCOUNTER
Please approve or deny     Last Visit Date:  8/18/2021       Next Visit Date:    Visit date not found

## 2022-03-07 RX ORDER — RAMIPRIL 10 MG/1
10 CAPSULE ORAL DAILY
Qty: 90 CAPSULE | Refills: 2 | Status: SHIPPED | OUTPATIENT
Start: 2022-03-07

## 2022-05-10 RX ORDER — CITALOPRAM 10 MG/1
10 TABLET ORAL DAILY
Qty: 90 TABLET | Refills: 0 | Status: SHIPPED | OUTPATIENT
Start: 2022-05-10 | End: 2022-08-15 | Stop reason: SDUPTHER

## 2022-05-10 RX ORDER — AMLODIPINE BESYLATE 5 MG/1
5 TABLET ORAL DAILY
Qty: 90 TABLET | Refills: 0 | Status: SHIPPED | OUTPATIENT
Start: 2022-05-10 | End: 2022-08-15 | Stop reason: SDUPTHER

## 2022-05-24 RX ORDER — BUSPIRONE HYDROCHLORIDE 5 MG/1
5 TABLET ORAL 2 TIMES DAILY
Qty: 180 TABLET | Refills: 1 | Status: SHIPPED | OUTPATIENT
Start: 2022-05-24

## 2022-05-31 RX ORDER — GABAPENTIN 100 MG/1
100 CAPSULE ORAL 3 TIMES DAILY
Qty: 270 CAPSULE | Refills: 0 | Status: SHIPPED | OUTPATIENT
Start: 2022-05-31 | End: 2022-10-03 | Stop reason: SDUPTHER

## 2022-05-31 NOTE — TELEPHONE ENCOUNTER
Pt request gabapentin 100mg tid be sent to [address] [place] [address].   Last seen 8/18/21. Pt aware a Rx would be sent but she needs seen for further refills. States she will call back and schedule.

## 2022-06-22 NOTE — TELEPHONE ENCOUNTER
LOV 08/18/2021    No follow up made- patient will call back to make appt when her daughter can bring her.

## 2022-07-20 ENCOUNTER — TELEPHONE (OUTPATIENT)
Dept: PHARMACY | Facility: CLINIC | Age: 82
End: 2022-07-20

## 2022-07-20 ENCOUNTER — OFFICE VISIT (OUTPATIENT)
Dept: FAMILY MEDICINE CLINIC | Age: 82
End: 2022-07-20
Payer: MEDICARE

## 2022-07-20 VITALS
OXYGEN SATURATION: 93 % | RESPIRATION RATE: 16 BRPM | WEIGHT: 180 LBS | SYSTOLIC BLOOD PRESSURE: 142 MMHG | HEART RATE: 54 BPM | HEIGHT: 63 IN | TEMPERATURE: 97.8 F | BODY MASS INDEX: 31.89 KG/M2 | DIASTOLIC BLOOD PRESSURE: 60 MMHG

## 2022-07-20 DIAGNOSIS — Z00.00 MEDICARE ANNUAL WELLNESS VISIT, SUBSEQUENT: Primary | ICD-10-CM

## 2022-07-20 DIAGNOSIS — Z79.4 TYPE 2 DIABETES MELLITUS WITHOUT COMPLICATION, WITH LONG-TERM CURRENT USE OF INSULIN (HCC): ICD-10-CM

## 2022-07-20 DIAGNOSIS — R00.1 BRADYCARDIA: ICD-10-CM

## 2022-07-20 DIAGNOSIS — E11.9 TYPE 2 DIABETES MELLITUS WITHOUT COMPLICATION, WITH LONG-TERM CURRENT USE OF INSULIN (HCC): ICD-10-CM

## 2022-07-20 LAB — HBA1C MFR BLD: 7 %

## 2022-07-20 PROCEDURE — G0439 PPPS, SUBSEQ VISIT: HCPCS | Performed by: NURSE PRACTITIONER

## 2022-07-20 PROCEDURE — 3051F HG A1C>EQUAL 7.0%<8.0%: CPT | Performed by: NURSE PRACTITIONER

## 2022-07-20 PROCEDURE — 83036 HEMOGLOBIN GLYCOSYLATED A1C: CPT | Performed by: NURSE PRACTITIONER

## 2022-07-20 PROCEDURE — 1123F ACP DISCUSS/DSCN MKR DOCD: CPT | Performed by: NURSE PRACTITIONER

## 2022-07-20 RX ORDER — METFORMIN HYDROCHLORIDE 500 MG/1
500 TABLET, EXTENDED RELEASE ORAL
Qty: 90 TABLET | Refills: 1 | Status: SHIPPED | OUTPATIENT
Start: 2022-07-20

## 2022-07-20 SDOH — ECONOMIC STABILITY: FOOD INSECURITY: WITHIN THE PAST 12 MONTHS, THE FOOD YOU BOUGHT JUST DIDN'T LAST AND YOU DIDN'T HAVE MONEY TO GET MORE.: NEVER TRUE

## 2022-07-20 SDOH — ECONOMIC STABILITY: FOOD INSECURITY: WITHIN THE PAST 12 MONTHS, YOU WORRIED THAT YOUR FOOD WOULD RUN OUT BEFORE YOU GOT MONEY TO BUY MORE.: NEVER TRUE

## 2022-07-20 ASSESSMENT — PATIENT HEALTH QUESTIONNAIRE - PHQ9
2. FEELING DOWN, DEPRESSED OR HOPELESS: 3
SUM OF ALL RESPONSES TO PHQ9 QUESTIONS 1 & 2: 6
3. TROUBLE FALLING OR STAYING ASLEEP: 3
SUM OF ALL RESPONSES TO PHQ QUESTIONS 1-9: 19
5. POOR APPETITE OR OVEREATING: 0
SUM OF ALL RESPONSES TO PHQ QUESTIONS 1-9: 19
10. IF YOU CHECKED OFF ANY PROBLEMS, HOW DIFFICULT HAVE THESE PROBLEMS MADE IT FOR YOU TO DO YOUR WORK, TAKE CARE OF THINGS AT HOME, OR GET ALONG WITH OTHER PEOPLE: 1
1. LITTLE INTEREST OR PLEASURE IN DOING THINGS: 3
8. MOVING OR SPEAKING SO SLOWLY THAT OTHER PEOPLE COULD HAVE NOTICED. OR THE OPPOSITE, BEING SO FIGETY OR RESTLESS THAT YOU HAVE BEEN MOVING AROUND A LOT MORE THAN USUAL: 3
SUM OF ALL RESPONSES TO PHQ QUESTIONS 1-9: 19
9. THOUGHTS THAT YOU WOULD BE BETTER OFF DEAD, OR OF HURTING YOURSELF: 0
6. FEELING BAD ABOUT YOURSELF - OR THAT YOU ARE A FAILURE OR HAVE LET YOURSELF OR YOUR FAMILY DOWN: 3
4. FEELING TIRED OR HAVING LITTLE ENERGY: 3
SUM OF ALL RESPONSES TO PHQ QUESTIONS 1-9: 19
7. TROUBLE CONCENTRATING ON THINGS, SUCH AS READING THE NEWSPAPER OR WATCHING TELEVISION: 1

## 2022-07-20 ASSESSMENT — SOCIAL DETERMINANTS OF HEALTH (SDOH): HOW HARD IS IT FOR YOU TO PAY FOR THE VERY BASICS LIKE FOOD, HOUSING, MEDICAL CARE, AND HEATING?: NOT HARD AT ALL

## 2022-07-20 ASSESSMENT — LIFESTYLE VARIABLES
HOW OFTEN DO YOU HAVE A DRINK CONTAINING ALCOHOL: MONTHLY OR LESS
HOW MANY STANDARD DRINKS CONTAINING ALCOHOL DO YOU HAVE ON A TYPICAL DAY: 1 OR 2

## 2022-07-20 NOTE — PATIENT INSTRUCTIONS
APPOINTMENT AT DR Henry Carrera OFFICE  7/21/22 1:30PM WITH [facility]        Personalized Preventive Plan for Rylee Drake - 7/20/2022  Medicare offers a range of preventive health benefits. Some of the tests and screenings are paid in full while other may be subject to a deductible, co-insurance, and/or copay. Some of these benefits include a comprehensive review of your medical history including lifestyle, illnesses that may run in your family, and various assessments and screenings as appropriate. After reviewing your medical record and screening and assessments performed today your provider may have ordered immunizations, labs, imaging, and/or referrals for you. A list of these orders (if applicable) as well as your Preventive Care list are included within your After Visit Summary for your review. Other Preventive Recommendations:    A preventive eye exam performed by an eye specialist is recommended every 1-2 years to screen for glaucoma; cataracts, macular degeneration, and other eye disorders. A preventive dental visit is recommended every 6 months. Try to get at least 150 minutes of exercise per week or 10,000 steps per day on a pedometer . Order or download the FREE \"Exercise & Physical Activity: Your Everyday Guide\" from [employer]. Call 6-847.821.1795 or search [employer] online. You need 3246-2552 mg of calcium and 7464-7640 IU of vitamin D per day. It is possible to meet your calcium requirement with diet alone, but a vitamin D supplement is usually necessary to meet this goal.  When exposed to the sun, use a sunscreen that protects against both UVA and UVB radiation with an SPF of 30 or greater. Reapply every 2 to 3 hours or after sweating, drying off with a towel, or swimming. Always wear a seat belt when traveling in a car. Always wear a helmet when riding a bicycle or motorcycle.

## 2022-07-20 NOTE — PROGRESS NOTES
Medicare Annual Wellness Visit    Saul Martell is here for Medicare AWV, Dizziness (Dizziness in the morning when she wakes up), and Dysphagia (Ever since she fell and had neck surgery, has some problem drinking water, does ok with food)    Assessment & Plan   Medicare annual wellness visit, subsequent  Type 2 diabetes mellitus without complication, with long-term current use of insulin ([facility])  -     POCT glycosylated hemoglobin (Hb A1C)  -     metFORMIN (GLUCOPHAGE-XR) 500 MG extended release tablet; Take 1 tablet by mouth daily (with breakfast), Disp-90 tablet, R-1Normal  Bradycardia      Patient states she was taken off of insulin in the hospital within the last year, A1c checked and it is 7.0 today. We will continue her metformin only. Patient states her dysphagia was actually choking on water issue last night with that she drank too fast and does not really want any further investigation of this. Due to patient's significant bradycardia today, sometimes below 50, and her risk of falls, she is secured an appointment with her cardiologist tomorrow for reevaluation. Recommendations for Preventive Services Due: see orders and patient instructions/AVS.  Recommended screening schedule for the next 5-10 years is provided to the patient in written form: see Patient Instructions/AVS.     Return in 1 year (on 7/20/2023) for Medicare Annual Wellness Visit in 1 year. Subjective   The following acute and/or chronic problems were also addressed today:  Patient has ongoing dizziness and shortness of breath with ambulation. She saw her cardiologist 2 months ago for the same complaint and there were no changes made at that time. Patient states she feels dizzy on sitting up to get out of bed. She has had 3 falls in the last year, none of which seem related directly to her dizziness. No chest pain, headache, sweating or other acute coronary symptoms.     Patient's complete Health Risk Assessment and screening values have been reviewed and are found in Flowsheets. The following problems were reviewed today and where indicated follow up appointments were made and/or referrals ordered. Positive Risk Factor Screenings with Interventions:    Fall Risk:  Do you feel unsteady or are you worried about falling? : (!) yes  2 or more falls in past year?: (!) yes  Fall with injury in past year?: (!) yes   Fall Risk Interventions:    Home safety tips provided     Depression:  PHQ-2 Score: 6  PHQ-9 Total Score: 19    Severity:1-4 = minimal depression, 5-9 = mild depression, 10-14 = moderate depression, 15-19 = moderately severe depression, 20-27 = severe depression  Depression Interventions:  Patient declines any further evaluation/treatment for this issue          General Health and ACP:  General  In general, how would you say your health is?: Fair  In the past 7 days, have you experienced any of the following: New or Increased Pain, New or Increased Fatigue, Loneliness, Social Isolation, Stress or Anger?: (!) Yes  Select all that apply: (!) New or Increased Fatigue, Stress  Do you get the social and emotional support that you need?: Yes  Do you have a Living Will?: Yes    Advance Directives       Power of  Living Will ACP-Advance Directive ACP-Power of     Not on File Not on File Not on File Not on File        General Health Risk Interventions:  Patient has significant family involvement with her care. Health Habits/Nutrition:  Physical Activity: Insufficiently Active    Days of Exercise per Week: 2 days    Minutes of Exercise per Session: 30 min     Have you lost any weight without trying in the past 3 months?: No  Body mass index: (!) 32.39  Have you seen the dentist within the past year?: (!) No  Health Habits/Nutrition Interventions:  Interventions for physical activity are limited by her physical immobility limits.      Safety:  Do you have working smoke detectors?: Yes  Do you have any tripping hazards - loose or unsecured carpets or rugs?: (!) Yes  Do you have any tripping hazards - clutter in doorways, halls, or stairs?: (!) Yes  Do you have either shower bars, grab bars, non-slip mats or non-slip surfaces in your shower or bathtub?: Yes  Do all of your stairways have a railing or banister?: Yes  Do you always fasten your seatbelt when you are in a car?: Yes  Safety Interventions:  Home safety tips provided    ADLs:  In the past 7 days, did you need help from others to perform any of the following everyday activities: Eating, dressing, grooming, bathing, toileting, or walking/balance?: (!) Yes  Select all that apply: (!) Walking/Balance  In the past 7 days, did you need help from others to take care of any of the following: Laundry, housekeeping, banking/finances, shopping, telephone use, food preparation, transportation, or taking medications?: (!) Yes  Select all that apply: (!) Banking/Finances, Shopping, Food Preparation, Transportation  ADL Interventions:  Patient has significant family involvement with her care for these issues. Objective   Vitals:    07/20/22 0930 07/20/22 0933   BP: (!) 144/60 (!) 142/60   Site: Right Upper Arm Left Upper Arm   Pulse: 54    Resp: 16    Temp: 97.8 °F (36.6 °C)    TempSrc: Oral    SpO2: 93%    Weight: 180 lb (81.6 kg)    Height: 5' 2.5\" (1.588 m)       Body mass index is 32.4 kg/m².       General Appearance: alert and oriented to person, place and time, well developed and well- nourished, in no acute distress  Skin: warm and dry, no rash or erythema  Head: normocephalic and atraumatic  Eyes: pupils equal, round, and reactive to light, extraocular eye movements intact, conjunctivae normal  ENT: tympanic membrane, external ear and ear canal normal bilaterally, nose without deformity, nasal mucosa and turbinates normal without polyps  Neck: supple and non-tender without mass, no thyromegaly or thyroid nodules, no cervical lymphadenopathy  Pulmonary/Chest: clear to auscultation bilaterally- no wheezes, rales or rhonchi, normal air movement, no respiratory distress  Cardiovascular: bradycardia rate, regular rhythm, normal S1 and S2, no murmurs, rubs, clicks, or gallops, distal pulses intact, no carotid bruits  Abdomen: soft, non-tender, non-distended, normal bowel sounds, no masses or organomegaly  Extremities: no cyanosis, clubbing or edema  Musculoskeletal: normal range of motion, no joint swelling, deformity or tenderness  Neurologic: reflexes normal and symmetric, no cranial nerve deficit, gait slow with use of walker, and speech normal       Allergies   Allergen Reactions    Trimethoprim Hives    Sulfa Antibiotics Hives     Prior to Visit Medications    Medication Sig Taking? Authorizing Provider   metFORMIN (GLUCOPHAGE-XR) 500 MG extended release tablet Take 1 tablet by mouth daily (with breakfast) Yes BRENDAN Markham CNP   apixaban (ELIQUIS) 5 MG TABS tablet Take 1 tablet by mouth 2 times daily Yes Elvia Perez MD   gabapentin (NEURONTIN) 100 MG capsule Take 1 capsule by mouth 3 times daily for 90 days.  Yes BRENDAN Markham CNP   busPIRone (BUSPAR) 5 MG tablet Take 1 tablet by mouth 2 times daily Yes Elvia Perez MD   citalopram (CELEXA) 10 MG tablet Take 1 tablet by mouth daily Yes Elvia Perez MD   amLODIPine (NORVASC) 5 MG tablet Take 1 tablet by mouth daily Yes Elvia Perez MD   metoprolol tartrate (LOPRESSOR) 25 MG tablet TAKE 3 TABLETS BY MOUTH TWICE DAILY Yes Elvia Perez MD   ramipril (ALTACE) 10 MG capsule Take 1 capsule by mouth daily Yes Elvia Perez MD   ondansetron (ZOFRAN) 4 MG tablet Take 1 tablet by mouth 3 times daily as needed for Nausea or Vomiting Yes Elvia Perez MD   potassium chloride (MICRO-K) 10 MEQ extended release capsule Take 1 capsule by mouth See Admin Instructions Qd on M,W,F Yes Elvia Perez MD   pantoprazole (PROTONIX) 40 MG tablet Take 1 tablet by mouth every morning (before breakfast) Yes Jaleel DAVIDSON Richar Rodriguez MD   miconazole (MICOTIN) 2 % powder Apply topically 2 times daily. Yes Merary Joy, APRN - CNP   atorvastatin (LIPITOR) 40 MG tablet Take 1 tablet by mouth daily Yes Lance Ramey MD   amiodarone (CORDARONE) 200 MG tablet Take 1 tablet by mouth daily Yes Historical Provider, MD   furosemide (LASIX) 40 MG tablet Take 40 mg by mouth See Admin Instructions Qd on M,W,F Yes Historical Provider, MD   magnesium oxide (MAG-OX) 400 MG tablet Take 400 mg by mouth daily Yes Historical Provider, MD   isosorbide mononitrate (IMDUR) 30 MG extended release tablet Take 30 mg by mouth daily Yes Historical Provider, MD   Insulin Pen Needle 31G X 8 MM MISC 1 each by Does not apply route daily Yes Lance Ramey MD   Omega-3 Fatty Acids (FISH OIL) 1000 MG CAPS Take 3,000 mg by mouth 2 times daily Yes Historical Provider, MD   Multiple Vitamins-Minerals (OCUVITE PRESERVISION PO) Take by mouth daily Yes Historical Provider, MD   Multiple Vitamins-Minerals (THERAPEUTIC MULTIVITAMIN-MINERALS) tablet Take 1 tablet by mouth daily.  Yes Historical Provider, MD [place] (Including outside providers/suppliers regularly involved in providing care):   Patient Care Team:  Lance Ramey MD as PCP - General (Family Medicine)  Lance Ramey MD as PCP - [facility] Empaneled Provider     Reviewed and updated this visit:  Tobacco  Allergies  Meds  Problems  Med Hx  Surg Hx  Soc Hx  Fam Hx

## 2022-07-20 NOTE — TELEPHONE ENCOUNTER
[facility] CLINICAL PHARMACY: ADHERENCE REVIEW  Identified care gap per [place]: fills at [facility]: ACE/ARB and Diabetes adherence    Last Visit:today [address] Records claims through 7.5.22  [facility] =  90%; Potential Fail Date: 8/27/22 ):   Ramipril 10mg. Next refill due: 6/27/22    Per  [facility] Portal:   last filled on 3/29/22 for 90 day supply. RF    Per [employer]:   Is already ready for     BP Readings from Last 3 Encounters:   07/20/22 (!) 142/60   08/18/21 122/64   08/11/21 127/60     Estimated Creatinine Clearance: 64 mL/min (based on SCr of 0.69 mg/dL). DIABETES ADHERENCE    Insurance Records claims through 7.5.22  [facility] =  72%; Potential Fail Date: 7/28/22 ):   METFORMIN HCL 1000 MG  Next refill due: 5/8/22    Per  [facility] Portal:   last filled on 2/7/22 for 90 day supply. Today dose was decreased to 500mg XR QD #90 new script sent    Per [employer]:   Is already ready for     Lab Results   Component Value Date    LABA1C 7.0 07/20/2022    LABA1C 6.6 (A) 09/14/2021    LABA1C 8.6 03/11/2021     NOTE A1c <9%    PLAN  The following are interventions that have been identified:   Both were already ready for . Zero copay on either. Reminder to patient for       Future Appointments   Date Time Provider Filemon Sloan   7/21/2023  9:00 AM Xuan Amos MD SRPX [facility] - [facility].    [facility] free: [facility] Donny Only    CPA in place:  No  Gap Closed?: No   Time Spent (min): 15

## 2022-08-15 RX ORDER — AMLODIPINE BESYLATE 5 MG/1
5 TABLET ORAL DAILY
Qty: 90 TABLET | Refills: 0 | Status: SHIPPED | OUTPATIENT
Start: 2022-08-15

## 2022-08-15 RX ORDER — CITALOPRAM 10 MG/1
10 TABLET ORAL DAILY
Qty: 90 TABLET | Refills: 0 | Status: SHIPPED | OUTPATIENT
Start: 2022-08-15

## 2022-10-03 RX ORDER — GABAPENTIN 100 MG/1
100 CAPSULE ORAL 3 TIMES DAILY
Qty: 270 CAPSULE | Refills: 0 | Status: SHIPPED | OUTPATIENT
Start: 2022-10-03 | End: 2023-01-01

## 2022-10-03 RX ORDER — PANTOPRAZOLE SODIUM 40 MG/1
40 TABLET, DELAYED RELEASE ORAL
Qty: 90 TABLET | Refills: 1 | Status: SHIPPED | OUTPATIENT
Start: 2022-10-03

## 2022-10-05 ENCOUNTER — OFFICE VISIT (OUTPATIENT)
Dept: FAMILY MEDICINE CLINIC | Age: 82
End: 2022-10-05
Payer: MEDICARE

## 2022-10-05 VITALS
OXYGEN SATURATION: 96 % | RESPIRATION RATE: 16 BRPM | HEART RATE: 57 BPM | SYSTOLIC BLOOD PRESSURE: 130 MMHG | BODY MASS INDEX: 33.3 KG/M2 | DIASTOLIC BLOOD PRESSURE: 72 MMHG | WEIGHT: 185 LBS

## 2022-10-05 DIAGNOSIS — F41.9 ANXIETY: Primary | ICD-10-CM

## 2022-10-05 PROCEDURE — 99213 OFFICE O/P EST LOW 20 MIN: CPT | Performed by: NURSE PRACTITIONER

## 2022-10-05 PROCEDURE — 1123F ACP DISCUSS/DSCN MKR DOCD: CPT | Performed by: NURSE PRACTITIONER

## 2022-10-05 RX ORDER — SERTRALINE HYDROCHLORIDE 25 MG/1
25 TABLET, FILM COATED ORAL DAILY
Qty: 30 TABLET | Refills: 3 | Status: SHIPPED | OUTPATIENT
Start: 2022-10-05

## 2022-10-05 RX ORDER — ATORVASTATIN CALCIUM 20 MG/1
TABLET, FILM COATED ORAL
COMMUNITY
Start: 2022-10-03 | End: 2022-10-05

## 2022-10-05 RX ORDER — CLOPIDOGREL BISULFATE 75 MG/1
TABLET ORAL
COMMUNITY
Start: 2022-08-29 | End: 2022-10-05

## 2022-10-05 RX ORDER — ASPIRIN 81 MG/1
TABLET, COATED ORAL
COMMUNITY
Start: 2022-08-05

## 2022-10-05 RX ORDER — NITROGLYCERIN 0.4 MG/1
TABLET SUBLINGUAL
COMMUNITY
Start: 2022-08-15

## 2022-10-05 ASSESSMENT — ENCOUNTER SYMPTOMS
COUGH: 0
TROUBLE SWALLOWING: 0
SORE THROAT: 0
RHINORRHEA: 0
VOMITING: 0
DIARRHEA: 0
BACK PAIN: 0
EYE DISCHARGE: 0
WHEEZING: 0
NAUSEA: 0
EYE PAIN: 0
ALLERGIC/IMMUNOLOGIC NEGATIVE: 1
ABDOMINAL PAIN: 0
SHORTNESS OF BREATH: 0
CONSTIPATION: 0
EYE REDNESS: 0

## 2022-10-05 NOTE — PROGRESS NOTES
[address] [place]  [facility] [address]  Dept: [phone]  Dept Fax: [phone]  Loc: [phone]     Visit Date:  10/5/2022      Patient:  Samreen Arcos  YOB: 1940    HPI:     Chief Complaint   Patient presents with    Anxiety     Asking if Zoloft could help her anxiety. Flu Vaccine       Patient presents today along with her daughter for concern about ongoing anxiety and nervousness. Patient states she and her family all have a history of this problem and she would like to start Zoloft if at all possible since this is worked well for her mother and her family. Patient has a daughter who recently had diabetic related amputation and she is coming home to live with the patient for care, and the patient is worried about this. Is having some sleeping difficulties. Anxiety  Symptoms include nervous/anxious behavior. Patient reports no dizziness, nausea or shortness of breath. Medications    Current Outpatient Medications:     ASPIRIN LOW DOSE 81 MG EC tablet, , Disp: , Rfl:     nitroGLYCERIN (NITROSTAT) 0.4 MG SL tablet, DISSOLVE ONE TABLET UNDER THE TONGUE EVERY 5 MINUTES AS NEEDED FOR CHEST PAIN. DO NOT EXCEED A TOTAL OF 3 DOSES IN 15 MINUTES . IF CP PERSISTS CALL 911., Disp: , Rfl:     sertraline (ZOLOFT) 25 MG tablet, Take 1 tablet by mouth daily, Disp: 30 tablet, Rfl: 3    gabapentin (NEURONTIN) 100 MG capsule, Take 1 capsule by mouth 3 times daily for 90 days. , Disp: 270 capsule, Rfl: 0    pantoprazole (PROTONIX) 40 MG tablet, Take 1 tablet by mouth every morning (before breakfast), Disp: 90 tablet, Rfl: 1    citalopram (CELEXA) 10 MG tablet, Take 1 tablet by mouth in the morning., Disp: 90 tablet, Rfl: 0    amLODIPine (NORVASC) 5 MG tablet, Take 1 tablet by mouth in the morning., Disp: 90 tablet, Rfl: 0    metoprolol tartrate (LOPRESSOR) 25 MG tablet, Take 1 tablet by mouth in the morning and 1 tablet at noon and 1 tablet before bedtime. , Disp: 270 tablet, Rfl: 0    metFORMIN (GLUCOPHAGE-XR) 500 MG extended release tablet, Take 1 tablet by mouth daily (with breakfast), Disp: 90 tablet, Rfl: 1    apixaban (ELIQUIS) 5 MG TABS tablet, Take 1 tablet by mouth 2 times daily, Disp: 180 tablet, Rfl: 1    busPIRone (BUSPAR) 5 MG tablet, Take 1 tablet by mouth 2 times daily, Disp: 180 tablet, Rfl: 1    ramipril (ALTACE) 10 MG capsule, Take 1 capsule by mouth daily, Disp: 90 capsule, Rfl: 2    potassium chloride (MICRO-K) 10 MEQ extended release capsule, Take 1 capsule by mouth See Admin Instructions Qd on M,W,F, Disp: 60 capsule, Rfl: 1    miconazole (MICOTIN) 2 % powder, Apply topically 2 times daily. , Disp: 45 g, Rfl: 1    atorvastatin (LIPITOR) 40 MG tablet, Take 1 tablet by mouth daily, Disp: 90 tablet, Rfl: 1    amiodarone (CORDARONE) 200 MG tablet, Take 1 tablet by mouth daily, Disp: , Rfl:     furosemide (LASIX) 40 MG tablet, Take 40 mg by mouth See Admin Instructions Qd on M,W,F, Disp: , Rfl:     magnesium oxide (MAG-OX) 400 MG tablet, Take 400 mg by mouth daily, Disp: , Rfl:     isosorbide mononitrate (IMDUR) 30 MG extended release tablet, Take 30 mg by mouth daily, Disp: , Rfl:     Insulin Pen Needle 31G X 8 MM MISC, 1 each by Does not apply route daily, Disp: 100 each, Rfl: 3    Omega-3 Fatty Acids (FISH OIL) 1000 MG CAPS, Take 3,000 mg by mouth 2 times daily, Disp: , Rfl:     Multiple Vitamins-Minerals (THERAPEUTIC MULTIVITAMIN-MINERALS) tablet, Take 1 tablet by mouth daily. , Disp: , Rfl:     Multiple Vitamins-Minerals (OCUVITE PRESERVISION PO), Take by mouth daily (Patient not taking: Reported on 10/5/2022), Disp: , Rfl:     The patient is allergic to trimethoprim and sulfa antibiotics.     Past Medical History  Ivelisse Fraser  has a past medical history of Anxiety, Atrial fibrillation ([address] Cardiac murmur, Hypercholesterolemia, Hypertension, Microscopic hematuria, Obesity (BMI 30-39.9), Snoring, and Type II or unspecified type diabetes mellitus without mention of complication, not stated as uncontrolled. Subjective:      Review of Systems   Constitutional:  Negative for activity change, fatigue and fever. HENT:  Negative for congestion, ear pain, rhinorrhea, sore throat and trouble swallowing. Eyes:  Negative for pain, discharge and redness. Respiratory:  Negative for cough, shortness of breath and wheezing. Cardiovascular: Negative. Gastrointestinal:  Negative for abdominal pain, constipation, diarrhea, nausea and vomiting. Endocrine: Negative. Genitourinary:  Negative for dysuria, frequency and urgency. Musculoskeletal:  Negative for arthralgias, back pain and myalgias. Skin:  Negative for rash. Allergic/Immunologic: Negative. Neurological:  Negative for dizziness, tremors, weakness and headaches. Hematological: Negative. Psychiatric/Behavioral:  Negative for dysphoric mood and sleep disturbance. The patient is nervous/anxious. Objective:     /72   Pulse 57   Resp 16   Wt 185 lb (83.9 kg)   SpO2 96%   BMI 33.30 kg/m²     Physical Exam  Constitutional:       General: She is not in acute distress. Appearance: Normal appearance. She is well-developed. She is not diaphoretic. HENT:      Right Ear: Hearing and external ear normal.      Left Ear: Hearing and external ear normal.      Nose: Nose normal. No mucosal edema or rhinorrhea. Mouth/Throat:      Mouth: Mucous membranes are moist.      Dentition: Normal dentition. Pharynx: Uvula midline. No posterior oropharyngeal erythema. Tonsils: No tonsillar exudate. 0 on the right. 0 on the left. Eyes:      General: Lids are normal.         Right eye: No discharge. Left eye: No discharge. Conjunctiva/sclera: Conjunctivae normal.      Right eye: Right conjunctiva is not injected. No chemosis. Left eye: No chemosis. Pupils: Pupils are equal, round, and reactive to light.    Neck: Vascular: No JVD. Trachea: Trachea normal.   Cardiovascular:      Rate and Rhythm: Normal rate and regular rhythm. Heart sounds: Normal heart sounds. No murmur heard. Pulmonary:      Effort: Pulmonary effort is normal. No respiratory distress. Breath sounds: Normal breath sounds. No stridor. Musculoskeletal:         General: No tenderness or deformity. Normal range of motion. Cervical back: Full passive range of motion without pain and normal range of motion. Skin:     General: Skin is warm. Capillary Refill: Capillary refill takes less than 2 seconds. Findings: No rash. Neurological:      Mental Status: She is alert and oriented to person, place, and time. GCS: GCS eye subscore is 4. GCS verbal subscore is 5. GCS motor subscore is 6. Cranial Nerves: No cranial nerve deficit. Coordination: Coordination normal.      Gait: Gait normal.   Psychiatric:         Attention and Perception: Attention and perception normal.         Mood and Affect: Affect normal. Mood is anxious. Mood is not depressed. Speech: Speech normal.         Behavior: Behavior normal. Behavior is not withdrawn or hyperactive. Behavior is cooperative. Thought Content: Thought content normal.         Cognition and Memory: Cognition and memory normal.         Judgment: Judgment normal.       Assessment/Plan:      Mallory Gregorio was seen today for anxiety and flu vaccine. Diagnoses and all orders for this visit:    Anxiety  -     sertraline (ZOLOFT) 25 MG tablet; Take 1 tablet by mouth daily    Other orders  -     Cancel: Influenza, FLUAD, (age 72 y+), IM, Preservative Free, 0.5 mL    We discussed potential side effects, the length of time expected before results are noted and to let us know if any untoward side effects or questions develop. Return if symptoms worsen or fail to improve. Patient instructions given rancho.         Electronically signed by BRENDAN Ortiz CNP on 10/5/2022 at 8:39 AM

## 2022-10-24 ENCOUNTER — TELEPHONE (OUTPATIENT)
Dept: PHARMACY | Facility: CLINIC | Age: 82
End: 2022-10-24

## 2022-10-24 NOTE — TELEPHONE ENCOUNTER
[facility] CLINICAL PHARMACY: ADHERENCE REVIEW  Identified care gap per [place]: fills at [facility]: ACE/ARB, Diabetes, and Statin adherence    Last Visit: 10/5/22    ASSESSMENT  ACE/ARB ADHERENCE    Insurance Records claims through 10/10/22; [place] =  88%; Potential Fail Date: 11/26/22 ):   RAMIPRIL 10 MG CAPSULE due to refill 10/18/22 for 90 day supply. Per [facility]:   RAMIPRIL 10 MG CAPSULE last picked up on 7/20/22 for 90 day supply. 2 refills remaining. Billed through [employer]. Had pharmacy fill over phone and awaiting . BP Readings from Last 3 Encounters:   10/05/22 130/72   07/20/22 (!) 142/60   08/18/21 122/64     Estimated Creatinine Clearance: 63 mL/min (based on SCr of 0.71 mg/dL). DIABETES ADHERENCE    Insurance Records claims through 10/10/22; Y[place] =  76%; Potential Fail Date: 10/27/22 ):   METFORMIN TAB 500MG ER due to refill 10/18/22 for 90 day supply. Per [facility]:   METFORMIN TAB 500MG ER last picked up on 7/20/22 for 90 day supply. 1 refills remaining. Billed through [employer]. Had pharmacy filled over phone and awaiting . Lab Results   Component Value Date    LABA1C 7.0 07/20/2022    LABA1C 6.6 (A) 09/14/2021    LABA1C 8.6 03/11/2021     NOTE A1c <9%    STATIN ADHERENCE    Insurance Records claims through 10/10/22; DAVID [place]cd[place] =  56%; Potential Fail Date: 11/3/22 ):   ATORVASTATIN TAB 20MG due to refill 11/2/22 for 30 day supply. Per [facility]:   ATORVASTATIN TAB 20MG last picked up on 10/3/22 for 30 day supply.  Billed through [employer]     Lab Results   Component Value Date    CHOL 132 08/03/2022    TRIG 41 08/03/2022    HDL 49 08/03/2022    LDLCALC 164 (A) 05/08/2018    LDLDIRECT 81 08/03/2022     ALT   Date Value Ref Range Status   08/03/2022 8 (L) 10 - 40 IU/L Final     AST   Date Value Ref Range Status   08/03/2022 9 (L) 15 - 41 IU/L Final     The ASCVD Risk score (Lonnie DK, et al., 2019) failed to calculate for the following reasons: The 2019 ASCVD risk score is only valid for ages 36 to 78     PLAN  The following are interventions that have been identified:   - Patient overdue refilling Ramipril and Metformin and active on home medication list.     Attempting to reach patient to review. Left message asking for return call. Need to remind patient to  Ramipril and Metformin from pharmacy. Sending letter.          Future Appointments   Date Time Provider Filemon Sloan   2023  9:00 AM Malena Barajas MD SRPX [address]   Direct: [phone]  Phone: toll free [phone]       For Pharmacy [facility] in place:  No  Recommendation Provided To: Pharmacy: 2  Intervention Detail: Adherence Monitorin  Gap Closed?: No   Intervention Accepted By: Pharmacy: 2  Time Spent (min): 15

## 2022-10-24 NOTE — LETTER
[place]  [address], [address] [address]  [address] [address]           10/24/22     Dear Sunni Rivas,    We tried to reach you recently regarding your Ramipril and Metformin, but were unable to reach you on the telephone. We have on file that you are currently taking Ramipril and Metformin. If you are no longer taking or taking differently, please call us at the number below so that we can discuss this and update your medication profile. It appears that this medication has not been filled at proper times. We are worried you might be missing doses or not taking it as directed. It is important that you take your medications regularly and try not to miss a single dose. Some ways to help you remember to take and refill your medications are to:  · Use a pill box, set an alarm, and/or keep your medication near something that you do every day  · Fill a 3-month supply of your prescription at a time to save you time and trips to the pharmacy - if you would like assistance in switching your prescriptions to a 3-month supply, please contact us.    · Ask your pharmacy if they participate in [facility]", a program where you can  all of your medications on the same day  · Ask your pharmacy if you can be set up with automatic refill, where they will automatically refill your prescription when it is due and let you know it's ready to     Sincerely,   [place] [address]   Phone: toll free [phone], option #1

## 2022-10-31 NOTE — TELEPHONE ENCOUNTER
Called pharmacy and had them fill Atorvastatin for 30 d/s since fail date was coming up at 11/3/22. Per previous note, already tried to contact patient about medications but unsuccessful.        Braden Norwood, [address] [facility]   Phone: [phone]

## 2022-11-10 RX ORDER — CITALOPRAM 10 MG/1
10 TABLET ORAL DAILY
Qty: 90 TABLET | Refills: 0 | Status: SHIPPED | OUTPATIENT
Start: 2022-11-10

## 2022-12-15 RX ORDER — BUSPIRONE HYDROCHLORIDE 5 MG/1
5 TABLET ORAL 2 TIMES DAILY
Qty: 180 TABLET | Refills: 2 | Status: SHIPPED | OUTPATIENT
Start: 2022-12-15

## 2022-12-15 RX ORDER — AMLODIPINE BESYLATE 5 MG/1
5 TABLET ORAL DAILY
Qty: 90 TABLET | Refills: 2 | Status: SHIPPED | OUTPATIENT
Start: 2022-12-15

## 2023-01-10 RX ORDER — GABAPENTIN 100 MG/1
100 CAPSULE ORAL 3 TIMES DAILY
Qty: 270 CAPSULE | Refills: 0 | Status: SHIPPED | OUTPATIENT
Start: 2023-01-10 | End: 2023-04-10

## 2023-01-10 NOTE — TELEPHONE ENCOUNTER
Patient is requesting refills sent to [facility] on [address] of:  gabapentin (NEURONTIN) 100 MG capsule, Route:  Take 1 capsule by mouth 3 times daily for 90 day-- LR: 10/3/22 #270/0    metoprolol tartrate (LOPRESSOR) 25 MG tablet, Take 1 tablet by mouth in the morning and 1 tablet at noon and 1 tablet before bedtime-- LR: 8/15/22 #270/0    apixaban (ELIQUIS) 5 MG TABS tablet, Take 1 tablet by mouth 2 times daily-- LR: 6/22/22 #180/1      Last OV: 10/5/22

## 2023-02-07 DIAGNOSIS — F41.9 ANXIETY: ICD-10-CM

## 2023-02-07 RX ORDER — RAMIPRIL 10 MG/1
10 CAPSULE ORAL DAILY
Qty: 90 CAPSULE | Refills: 2 | Status: SHIPPED | OUTPATIENT
Start: 2023-02-07

## 2023-02-07 RX ORDER — SERTRALINE HYDROCHLORIDE 25 MG/1
25 TABLET, FILM COATED ORAL DAILY
Qty: 90 TABLET | Refills: 2 | Status: SHIPPED | OUTPATIENT
Start: 2023-02-07

## 2023-02-13 RX ORDER — CITALOPRAM 10 MG/1
10 TABLET ORAL DAILY
Qty: 90 TABLET | Refills: 0 | Status: SHIPPED | OUTPATIENT
Start: 2023-02-13

## 2023-02-22 DIAGNOSIS — E11.9 TYPE 2 DIABETES MELLITUS WITHOUT COMPLICATION, WITH LONG-TERM CURRENT USE OF INSULIN (HCC): ICD-10-CM

## 2023-02-22 DIAGNOSIS — Z79.4 TYPE 2 DIABETES MELLITUS WITHOUT COMPLICATION, WITH LONG-TERM CURRENT USE OF INSULIN (HCC): ICD-10-CM

## 2023-02-22 RX ORDER — METFORMIN HYDROCHLORIDE 500 MG/1
TABLET, EXTENDED RELEASE ORAL
Qty: 90 TABLET | Refills: 0 | Status: SHIPPED | OUTPATIENT
Start: 2023-02-22

## 2023-02-23 NOTE — TELEPHONE ENCOUNTER
Pt seen in [facility] Pt is going to fu with [facility] urology. No records needed. Mercedes Flap Text: The defect edges were debeveled with a #15 scalpel blade. Given the location of the defect, shape of the defect and the proximity to free margins a Mercedes flap was deemed most appropriate. Using a sterile surgical marker, an appropriate advancement flap was drawn incorporating the defect and placing the expected incisions within the relaxed skin tension lines where possible. The area thus outlined was incised deep to adipose tissue with a #15 scalpel blade. The skin margins were undermined to an appropriate distance in all directions utilizing iris scissors. Following this, the designed flap was advanced and carried over into the primary defect and sutured into place.

## 2023-03-16 ENCOUNTER — OFFICE VISIT (OUTPATIENT)
Dept: FAMILY MEDICINE CLINIC | Age: 83
End: 2023-03-16
Payer: MEDICARE

## 2023-03-16 VITALS
WEIGHT: 173 LBS | BODY MASS INDEX: 31.14 KG/M2 | HEART RATE: 60 BPM | TEMPERATURE: 97.9 F | OXYGEN SATURATION: 97 % | RESPIRATION RATE: 60 BRPM | DIASTOLIC BLOOD PRESSURE: 70 MMHG | SYSTOLIC BLOOD PRESSURE: 132 MMHG

## 2023-03-16 DIAGNOSIS — Z79.4 TYPE 2 DIABETES MELLITUS WITHOUT COMPLICATION, WITH LONG-TERM CURRENT USE OF INSULIN (HCC): Primary | ICD-10-CM

## 2023-03-16 DIAGNOSIS — E11.9 TYPE 2 DIABETES MELLITUS WITHOUT COMPLICATION, WITH LONG-TERM CURRENT USE OF INSULIN (HCC): Primary | ICD-10-CM

## 2023-03-16 DIAGNOSIS — R53.1 WEAKNESS: ICD-10-CM

## 2023-03-16 DIAGNOSIS — R42 DIZZINESS: ICD-10-CM

## 2023-03-16 DIAGNOSIS — I48.11 LONGSTANDING PERSISTENT ATRIAL FIBRILLATION (HCC): ICD-10-CM

## 2023-03-16 DIAGNOSIS — I35.0 NONRHEUMATIC AORTIC VALVE STENOSIS: ICD-10-CM

## 2023-03-16 PROCEDURE — 3078F DIAST BP <80 MM HG: CPT | Performed by: FAMILY MEDICINE

## 2023-03-16 PROCEDURE — 3074F SYST BP LT 130 MM HG: CPT | Performed by: FAMILY MEDICINE

## 2023-03-16 PROCEDURE — 99214 OFFICE O/P EST MOD 30 MIN: CPT | Performed by: FAMILY MEDICINE

## 2023-03-16 PROCEDURE — 1123F ACP DISCUSS/DSCN MKR DOCD: CPT | Performed by: FAMILY MEDICINE

## 2023-03-16 SDOH — ECONOMIC STABILITY: FOOD INSECURITY: WITHIN THE PAST 12 MONTHS, YOU WORRIED THAT YOUR FOOD WOULD RUN OUT BEFORE YOU GOT MONEY TO BUY MORE.: NEVER TRUE

## 2023-03-16 SDOH — ECONOMIC STABILITY: HOUSING INSECURITY
IN THE LAST 12 MONTHS, WAS THERE A TIME WHEN YOU DID NOT HAVE A STEADY PLACE TO SLEEP OR SLEPT IN A SHELTER (INCLUDING NOW)?: NO

## 2023-03-16 SDOH — ECONOMIC STABILITY: INCOME INSECURITY: HOW HARD IS IT FOR YOU TO PAY FOR THE VERY BASICS LIKE FOOD, HOUSING, MEDICAL CARE, AND HEATING?: NOT HARD AT ALL

## 2023-03-16 SDOH — ECONOMIC STABILITY: FOOD INSECURITY: WITHIN THE PAST 12 MONTHS, THE FOOD YOU BOUGHT JUST DIDN'T LAST AND YOU DIDN'T HAVE MONEY TO GET MORE.: NEVER TRUE

## 2023-03-16 ASSESSMENT — PATIENT HEALTH QUESTIONNAIRE - PHQ9
SUM OF ALL RESPONSES TO PHQ QUESTIONS 1-9: 14
8. MOVING OR SPEAKING SO SLOWLY THAT OTHER PEOPLE COULD HAVE NOTICED. OR THE OPPOSITE, BEING SO FIGETY OR RESTLESS THAT YOU HAVE BEEN MOVING AROUND A LOT MORE THAN USUAL: 3
1. LITTLE INTEREST OR PLEASURE IN DOING THINGS: 3
4. FEELING TIRED OR HAVING LITTLE ENERGY: 3
5. POOR APPETITE OR OVEREATING: 0
SUM OF ALL RESPONSES TO PHQ QUESTIONS 1-9: 13
SUM OF ALL RESPONSES TO PHQ9 QUESTIONS 1 & 2: 4
6. FEELING BAD ABOUT YOURSELF - OR THAT YOU ARE A FAILURE OR HAVE LET YOURSELF OR YOUR FAMILY DOWN: 0
2. FEELING DOWN, DEPRESSED OR HOPELESS: 1
10. IF YOU CHECKED OFF ANY PROBLEMS, HOW DIFFICULT HAVE THESE PROBLEMS MADE IT FOR YOU TO DO YOUR WORK, TAKE CARE OF THINGS AT HOME, OR GET ALONG WITH OTHER PEOPLE: 2
SUM OF ALL RESPONSES TO PHQ QUESTIONS 1-9: 14
SUM OF ALL RESPONSES TO PHQ QUESTIONS 1-9: 14
7. TROUBLE CONCENTRATING ON THINGS, SUCH AS READING THE NEWSPAPER OR WATCHING TELEVISION: 0
9. THOUGHTS THAT YOU WOULD BE BETTER OFF DEAD, OR OF HURTING YOURSELF: 1
3. TROUBLE FALLING OR STAYING ASLEEP: 3

## 2023-03-16 ASSESSMENT — COLUMBIA-SUICIDE SEVERITY RATING SCALE - C-SSRS
1. WITHIN THE PAST MONTH, HAVE YOU WISHED YOU WERE DEAD OR WISHED YOU COULD GO TO SLEEP AND NOT WAKE UP?: YES
6. HAVE YOU EVER DONE ANYTHING, STARTED TO DO ANYTHING, OR PREPARED TO DO ANYTHING TO END YOUR LIFE?: NO
2. HAVE YOU ACTUALLY HAD ANY THOUGHTS OF KILLING YOURSELF?: NO

## 2023-03-18 ASSESSMENT — ENCOUNTER SYMPTOMS
DIARRHEA: 0
BACK PAIN: 0
VOMITING: 0
EYES NEGATIVE: 1
COUGH: 0
RHINORRHEA: 0
ABDOMINAL PAIN: 0
SHORTNESS OF BREATH: 1
NAUSEA: 0
SORE THROAT: 0
CHEST TIGHTNESS: 0

## 2023-03-18 NOTE — PROGRESS NOTES
[address] [address] [address]  Eddie Cordero [facility] [address]  Dept: [phone]  Dept Fax: [phone]  Loc: [phone]  PROGRESS NOTE      VisitDate: 3/16/2023    Preet Medina is a 81 y.o. female who presents today for:     Chief Complaint   Patient presents with    Pain     C/o abd pain, pain all over, always nauseated without vomitting, falls x4-5-leg gives out no LOC-did have a fall in the shower-c/o left arm, fatigued    Other     Discuss possible ECF placement    Depression     Positive depression screen         Subjective:  HPI  Patient comes in as he is having increasing shortness of breath abdominal discomfort falls weakness. Getting a lot of dizziness. She has a history of persistent atrial fibrillation as well as aortic valve stenosis. History of diabetes states blood sugars have been doing okay. Most recent labs reviewed with the patient and family member    Review of Systems   Constitutional:  Positive for fatigue. Negative for activity change, appetite change and fever. HENT:  Negative for congestion, rhinorrhea and sore throat. Eyes: Negative. Respiratory:  Positive for shortness of breath. Negative for cough and chest tightness. Cardiovascular:  Negative for chest pain and palpitations. Gastrointestinal:  Negative for abdominal pain, diarrhea, nausea and vomiting. Genitourinary:  Negative for dysuria and urgency. Musculoskeletal:  Negative for arthralgias and back pain. Neurological:  Positive for dizziness and weakness. Negative for headaches. Psychiatric/Behavioral:  Negative for dysphoric mood. The patient is not nervous/anxious.     Past Medical History:   Diagnosis Date    Anxiety     Atrial fibrillation ([address]     Cardiac murmur     Hypercholesterolemia     no medication recommended - just dietary recommendations    Hypertension     130's/80-90's at home, white coat hypertension, stress test 2007 prior to TKA at [facility]    Microscopic hematuria     Obesity (BMI 30-39. 9)     Snoring     no apnea, no waking coughing or choking, no am headache, BMI 36, neck circ. 15 inches    Type II or unspecified type diabetes mellitus without mention of complication, not stated as uncontrolled     FSBS        Past Surgical History:   Procedure Laterality Date    JOINT REPLACEMENT Left 2013    left hip replacement--Dr ARMANDO [facility]    KNEE ARTHROPLASTY Bilateral     left 2006 right 2007    ORIF DISTAL RADIUS FRACTURE Right 7/30/2019    ORIF RIGHT DISTAL RADIUS performed by Maura Plasencia MD at [address] [place] [address] History   Problem Relation Age of Onset    Heart Disease Mother 79        CABG, [place]    Stroke Mother     Heart Disease Father     Cancer Father         lung    Arthritis Father     Cancer Sister         brain tumor    Arthritis Brother     Arthritis Sister      Social History     Tobacco Use    Smoking status: Never    Smokeless tobacco: Never   Substance Use Topics    Alcohol use: Never      Current Outpatient Medications   Medication Sig Dispense Refill    metFORMIN (GLUCOPHAGE-XR) 500 MG extended release tablet Take 1 tablet by mouth once daily with breakfast 90 tablet 0    citalopram (CELEXA) 10 MG tablet Take 1 tablet by mouth daily 90 tablet 0    ramipril (ALTACE) 10 MG capsule Take 1 capsule by mouth daily 90 capsule 2    sertraline (ZOLOFT) 25 MG tablet Take 1 tablet by mouth daily 90 tablet 2    gabapentin (NEURONTIN) 100 MG capsule Take 1 capsule by mouth 3 times daily for 90 days.  270 capsule 0    metoprolol tartrate (LOPRESSOR) 25 MG tablet Take 1 tablet by mouth 3 times daily 270 tablet 0    apixaban (ELIQUIS) 5 MG TABS tablet Take 1 tablet by mouth 2 times daily 180 tablet 1    busPIRone (BUSPAR) 5 MG tablet Take 1 tablet by mouth 2 times daily 180 tablet 2    amLODIPine (NORVASC) 5 MG tablet Take 1 tablet by mouth daily 90 tablet 2    ASPIRIN LOW DOSE 81 MG EC tablet       nitroGLYCERIN (NITROSTAT) 0.4 MG SL tablet DISSOLVE ONE TABLET UNDER THE TONGUE EVERY 5 MINUTES AS NEEDED FOR CHEST PAIN. DO NOT EXCEED A TOTAL OF 3 DOSES IN 15 MINUTES . IF CP PERSISTS CALL 911. pantoprazole (PROTONIX) 40 MG tablet Take 1 tablet by mouth every morning (before breakfast) 90 tablet 1    potassium chloride (MICRO-K) 10 MEQ extended release capsule Take 1 capsule by mouth See Admin Instructions Qd on M,W,F 60 capsule 1    miconazole (MICOTIN) 2 % powder Apply topically 2 times daily. 45 g 1    atorvastatin (LIPITOR) 40 MG tablet Take 1 tablet by mouth daily 90 tablet 1    amiodarone (CORDARONE) 200 MG tablet Take 1 tablet by mouth daily      furosemide (LASIX) 40 MG tablet Take 40 mg by mouth See Admin Instructions Qd on M,W,F      magnesium oxide (MAG-OX) 400 MG tablet Take 400 mg by mouth daily      isosorbide mononitrate (IMDUR) 30 MG extended release tablet Take 30 mg by mouth daily      Insulin Pen Needle 31G X 8 MM MISC 1 each by Does not apply route daily 100 each 3    Omega-3 Fatty Acids (FISH OIL) 1000 MG CAPS Take 3,000 mg by mouth 2 times daily      Multiple Vitamins-Minerals (OCUVITE PRESERVISION PO) Take by mouth daily      Multiple Vitamins-Minerals (THERAPEUTIC MULTIVITAMIN-MINERALS) tablet Take 1 tablet by mouth daily. No current facility-administered medications for this visit.      Allergies   Allergen Reactions    Trimethoprim Hives    Sulfa Antibiotics Hives     Health Maintenance   Topic Date Due    DTaP/Tdap/Td vaccine (1 - Tdap) Never done    Shingles vaccine (1 of 2) Never done    DEXA (modify frequency per FRAX score)  Never done    Pneumococcal 65+ years Vaccine (2 - PPSV23 if available, else PCV20) 09/25/2019    COVID-19 Vaccine (3 - Booster for Moderna series) 05/27/2021    Flu vaccine (1) 08/01/2022    Annual Wellness Visit (AWV)  07/21/2023    Lipids  08/03/2023    Depression Monitoring  03/16/2024    Hepatitis A vaccine  Aged Out    Hib vaccine  Aged Out    Meningococcal (ACWY) vaccine  Aged Out Objective:     Physical Exam  Constitutional:       General: She is not in acute distress. Appearance: Normal appearance. She is well-developed. She is not diaphoretic. HENT:      Head: Normocephalic and atraumatic. Right Ear: External ear normal.      Left Ear: External ear normal.      Nose: Nose normal.      Mouth/Throat:      Pharynx: Oropharynx is clear. Eyes:      General: No scleral icterus. Conjunctiva/sclera: Conjunctivae normal.   Neck:      Thyroid: No thyromegaly. Vascular: No JVD. Comments: No bruits  Cardiovascular:      Rate and Rhythm: Normal rate. Heart sounds: Murmur heard. Pulmonary:      Effort: Pulmonary effort is normal. No respiratory distress. Breath sounds: Normal breath sounds. No wheezing or rales. Abdominal:      Palpations: Abdomen is soft. There is no mass. Tenderness: There is no abdominal tenderness. There is no guarding. Musculoskeletal:         General: No tenderness. Skin:     General: Skin is warm and dry. Findings: No rash. Neurological:      Mental Status: She is alert and oriented to person, place, and time. Mental status is at baseline. Cranial Nerves: No cranial nerve deficit. Psychiatric:         Mood and Affect: Mood normal.     /70 (Site: Right Upper Arm)   Pulse 60   Temp 97.9 °F (36.6 °C) (Oral)   Resp (!) 60   Wt 173 lb (78.5 kg)   SpO2 97%   BMI 31.14 kg/m²       Impression/Plan:  1. Type 2 diabetes mellitus without complication, with long-term current use of insulin ([address]    2. Dizziness    3. Weakness    4. Longstanding persistent atrial fibrillation ([address]    5.  Nonrheumatic aortic valve stenosis      Requested Prescriptions      No prescriptions requested or ordered in this encounter     Orders Placed This Encounter   Procedures    Comprehensive Metabolic Panel     Standing Status:   Future     Standing Expiration Date:   3/15/2024    CBC with Auto Differential     Standing Status: Future     Standing Expiration Date:   3/15/2024    T4, Free     Standing Status:   Future     Standing Expiration Date:   3/15/2024    TSH     Standing Status:   Future     Standing Expiration Date:   3/15/2024    Hemoglobin A1C     Standing Status:   Future     Standing Expiration Date:   3/15/2024    Urinalysis with Reflex to Culture     Order Specific Question:   SPECIFY(EX-CATH,MIDSTREAM,CYSTO,ETC)? Answer:   cc    External Referral To Cardiology     Referral Priority:   Routine     Referral Type:   Eval and Treat     Referral Reason:   Specialty Services Required     Requested Specialty:   Cardiology     Number of Visits Requested:   1       Patient giveneducational materials - see patient instructions. Discussed use, benefit, and side effects of prescribed medications. All patient questions answered. Pt voiced understanding. Reviewed health maintenance. Patient agreedwith treatment plan. Follow up as directed. **This report has been created using voice recognition software. It may contain minor errorswhich are inherent in voice recognition technology. **       Electronically signed by Lynda Bob MD on 3/18/2023 at 9:04 AM

## 2023-05-03 ENCOUNTER — TELEPHONE (OUTPATIENT)
Dept: FAMILY MEDICINE CLINIC | Age: 83
End: 2023-05-03

## 2023-05-03 DIAGNOSIS — M48.062 SPINAL STENOSIS OF LUMBAR REGION WITH NEUROGENIC CLAUDICATION: ICD-10-CM

## 2023-05-03 DIAGNOSIS — R53.1 WEAKNESS: Primary | ICD-10-CM

## 2023-05-03 DIAGNOSIS — R26.81 GAIT INSTABILITY: ICD-10-CM

## 2023-05-03 DIAGNOSIS — F41.9 ANXIETY: ICD-10-CM

## 2023-05-03 RX ORDER — GABAPENTIN 100 MG/1
100 CAPSULE ORAL 3 TIMES DAILY
Qty: 270 CAPSULE | Refills: 0 | Status: SHIPPED | OUTPATIENT
Start: 2023-05-03 | End: 2023-08-01

## 2023-05-03 RX ORDER — RAMIPRIL 10 MG/1
10 CAPSULE ORAL DAILY
Qty: 90 CAPSULE | Refills: 2 | Status: SHIPPED | OUTPATIENT
Start: 2023-05-03

## 2023-05-03 RX ORDER — SERTRALINE HYDROCHLORIDE 25 MG/1
25 TABLET, FILM COATED ORAL DAILY
Qty: 90 TABLET | Refills: 2 | Status: SHIPPED | OUTPATIENT
Start: 2023-05-03

## 2023-05-03 NOTE — TELEPHONE ENCOUNTER
DME order and last OV was faxed to [facility]  Referral and last OV was faxed to [facility]. Confirmation received.

## 2023-05-03 NOTE — TELEPHONE ENCOUNTER
Pt's sister called requesting orders for [facility] 2-3 days a week with [facility], pt is very weak. Also [facility] [employer] [facility] LOV: 3/16/2023    DME order for wheelchair pended    [id number] [address] for home health @ [facility]? Diagnosis?

## 2023-05-05 NOTE — TELEPHONE ENCOUNTER
Received a call from [place] [facility] stating [facility] is not in network. DME Wheelchair order and 3/16/23 OV was faxed to [address]. Left a message on Nellie sister's number informing her of this. Any questions she can call back on Monday.

## 2023-05-30 RX ORDER — PANTOPRAZOLE SODIUM 40 MG/1
TABLET, DELAYED RELEASE ORAL
Qty: 90 TABLET | Refills: 0 | Status: SHIPPED | OUTPATIENT
Start: 2023-05-30

## 2023-05-31 ENCOUNTER — TELEPHONE (OUTPATIENT)
Dept: FAMILY MEDICINE CLINIC | Age: 83
End: 2023-05-31

## 2023-05-31 DIAGNOSIS — E83.42 HYPOMAGNESEMIA: Primary | ICD-10-CM

## 2023-06-02 RX ORDER — MAGNESIUM OXIDE 400 MG/1
400 TABLET ORAL DAILY
Qty: 90 TABLET | Refills: 1 | Status: SHIPPED | OUTPATIENT
Start: 2023-06-02

## 2023-06-02 NOTE — TELEPHONE ENCOUNTER
Called [facility]-staff states Levar Soni is not answering and must be with a pt. Orders were given to [facility] Nurse.      Magnesium oxide Rx was escribed to [facility] ,  lab order was mailed to pt's home to repeat level in 3 months

## 2023-07-05 DIAGNOSIS — Z79.4 TYPE 2 DIABETES MELLITUS WITHOUT COMPLICATION, WITH LONG-TERM CURRENT USE OF INSULIN (HCC): ICD-10-CM

## 2023-07-05 DIAGNOSIS — E11.9 TYPE 2 DIABETES MELLITUS WITHOUT COMPLICATION, WITH LONG-TERM CURRENT USE OF INSULIN (HCC): ICD-10-CM

## 2023-07-05 RX ORDER — METFORMIN HYDROCHLORIDE 500 MG/1
500 TABLET, EXTENDED RELEASE ORAL
Qty: 90 TABLET | Refills: 1 | Status: SHIPPED | OUTPATIENT
Start: 2023-07-05

## 2023-09-21 RX ORDER — GABAPENTIN 100 MG/1
100 CAPSULE ORAL 3 TIMES DAILY
Qty: 270 CAPSULE | Refills: 0 | Status: SHIPPED | OUTPATIENT
Start: 2023-09-21 | End: 2023-12-20

## 2023-09-21 RX ORDER — PANTOPRAZOLE SODIUM 40 MG/1
TABLET, DELAYED RELEASE ORAL
Qty: 90 TABLET | Refills: 0 | Status: SHIPPED | OUTPATIENT
Start: 2023-09-21

## 2023-10-12 RX ORDER — BUSPIRONE HYDROCHLORIDE 5 MG/1
5 TABLET ORAL 2 TIMES DAILY
Qty: 180 TABLET | Refills: 2 | Status: SHIPPED | OUTPATIENT
Start: 2023-10-12

## 2023-12-04 RX ORDER — MAGNESIUM OXIDE TAB 400 MG (241.3 MG ELEMENTAL MG) 400 (241.3 MG) MG
400 TAB ORAL DAILY
Qty: 90 TABLET | Refills: 0 | Status: SHIPPED | OUTPATIENT
Start: 2023-12-04

## (undated) DEVICE — CHLORAPREP 26ML CLEAR

## (undated) DEVICE — 2.7MM SHORT DRILL BIT W/QUICK CONNECT: Brand: PERI-LOC

## (undated) DEVICE — PADDING,UNDERCAST,COTTON, 4"X4YD STERILE: Brand: MEDLINE

## (undated) DEVICE — PADDING UNDERCAST W4INXL12FT RAYON POLY SYN NONADHESIVE

## (undated) DEVICE — PERI-LOC 2.0MM SHORT DRILL BIT W/                                    QUICK CONNECT: Brand: PERI-LOC

## (undated) DEVICE — CHLORAPREP 26ML ORANGE

## (undated) DEVICE — PERI-LOC 3.5MM T20 LOCKING SCREW                                    16MM SELF-TAPPING
Type: IMPLANTABLE DEVICE | Site: WRIST | Status: NON-FUNCTIONAL
Brand: PERI-LOC

## (undated) DEVICE — SYRINGE,EAR/ULCER, 2 OZ, STERILE: Brand: MEDLINE

## (undated) DEVICE — YANKAUER,BULB TIP,W/O VENT,RIGID,STERILE: Brand: MEDLINE

## (undated) DEVICE — IMPREGNATED GAUZE DRESSING: Brand: CUTICERIN 7.5X7.5CM CTN 50

## (undated) DEVICE — SPONGE GZ W4XL4IN COT 12 PLY TYP VII WVN C FLD DSGN

## (undated) DEVICE — ROYAL SILK SURGICAL GOWN, XXL: Brand: CONVERTORS

## (undated) DEVICE — AGENT HEMSTAT W4XL8IN OXIDIZED REGENERATED CELOS ABSRB

## (undated) DEVICE — DRAPE C ARM W36XL30IN RECTANG BND BG AND TAPE

## (undated) DEVICE — SPLINT ORTH W4XL15IN WHT FBRGLS CNFRM STR LTWT SCOTCHCAST

## (undated) DEVICE — BANDAGE COMPR E SGL LAYERED CLSR BGE W/ CLP W4INXL15FT

## (undated) DEVICE — GOWN,SIRUS,NON REINFRCD,LARGE,SET IN SL: Brand: MEDLINE

## (undated) DEVICE — DRAPE,U/SHT,SPLIT,FILM,60X84,STERILE: Brand: MEDLINE

## (undated) DEVICE — TUBING, SUCTION, 1/4" X 20', STRAIGHT: Brand: MEDLINE INDUSTRIES, INC.

## (undated) DEVICE — GLOVE ORANGE PI 8   MSG9080

## (undated) DEVICE — GLOVE ORANGE PI 7   MSG9070

## (undated) DEVICE — SOLUTION IV 1000ML LAC RINGERS PH 6.5 INJ USP VIAFLX PLAS

## (undated) DEVICE — GAUZE,SPONGE,8"X4",12PLY,XRAY,STRL,LF: Brand: MEDLINE

## (undated) DEVICE — BANDAGE COMPR W4INXL12FT E DISP ESMARCH EVEN